# Patient Record
Sex: FEMALE | Race: WHITE | NOT HISPANIC OR LATINO | Employment: OTHER | URBAN - METROPOLITAN AREA
[De-identification: names, ages, dates, MRNs, and addresses within clinical notes are randomized per-mention and may not be internally consistent; named-entity substitution may affect disease eponyms.]

---

## 2024-09-09 ENCOUNTER — APPOINTMENT (OUTPATIENT)
Dept: HEMATOLOGY ONCOLOGY | Facility: MEDICAL CENTER | Age: 69
End: 2024-09-09
Payer: MEDICARE

## 2024-10-16 DIAGNOSIS — C53.8 MALIGNANT NEOPLASM OVERLAPPING CERVIX UTERI SITE (HCC): ICD-10-CM

## 2024-10-16 RX ORDER — 0.9 % SODIUM CHLORIDE 0.9 %
VIAL (ML) INJECTION PRN
Status: CANCELLED | OUTPATIENT
Start: 2024-10-22

## 2024-10-16 RX ORDER — 0.9 % SODIUM CHLORIDE 0.9 %
10 VIAL (ML) INJECTION PRN
OUTPATIENT
Start: 2024-11-01

## 2024-10-16 RX ORDER — 0.9 % SODIUM CHLORIDE 0.9 %
10 VIAL (ML) INJECTION PRN
Status: CANCELLED | OUTPATIENT
Start: 2024-10-22

## 2024-10-16 RX ORDER — 0.9 % SODIUM CHLORIDE 0.9 %
3 VIAL (ML) INJECTION PRN
Status: CANCELLED | OUTPATIENT
Start: 2024-10-21

## 2024-10-16 RX ORDER — METHYLPREDNISOLONE SODIUM SUCCINATE 125 MG/2ML
125 INJECTION, POWDER, LYOPHILIZED, FOR SOLUTION INTRAMUSCULAR; INTRAVENOUS PRN
Status: CANCELLED | OUTPATIENT
Start: 2024-10-22

## 2024-10-16 RX ORDER — ONDANSETRON 2 MG/ML
4 INJECTION INTRAMUSCULAR; INTRAVENOUS PRN
Status: CANCELLED | OUTPATIENT
Start: 2024-10-31

## 2024-10-16 RX ORDER — EPINEPHRINE 1 MG/ML(1)
0.5 AMPUL (ML) INJECTION PRN
OUTPATIENT
Start: 2024-11-01

## 2024-10-16 RX ORDER — PROCHLORPERAZINE MALEATE 10 MG
10 TABLET ORAL EVERY 6 HOURS PRN
Status: CANCELLED | OUTPATIENT
Start: 2024-10-22

## 2024-10-16 RX ORDER — ONDANSETRON 8 MG/1
8 TABLET, ORALLY DISINTEGRATING ORAL PRN
OUTPATIENT
Start: 2024-11-01

## 2024-10-16 RX ORDER — PROCHLORPERAZINE MALEATE 10 MG
10 TABLET ORAL EVERY 6 HOURS PRN
OUTPATIENT
Start: 2024-11-01

## 2024-10-16 RX ORDER — SODIUM CHLORIDE 9 MG/ML
INJECTION, SOLUTION INTRAVENOUS CONTINUOUS
Status: CANCELLED | OUTPATIENT
Start: 2024-10-31

## 2024-10-16 RX ORDER — ONDANSETRON 2 MG/ML
4 INJECTION INTRAMUSCULAR; INTRAVENOUS PRN
OUTPATIENT
Start: 2024-11-01

## 2024-10-16 RX ORDER — 0.9 % SODIUM CHLORIDE 0.9 %
VIAL (ML) INJECTION PRN
Status: CANCELLED | OUTPATIENT
Start: 2024-10-21

## 2024-10-16 RX ORDER — DIPHENHYDRAMINE HYDROCHLORIDE 50 MG/ML
50 INJECTION INTRAMUSCULAR; INTRAVENOUS PRN
Status: CANCELLED | OUTPATIENT
Start: 2024-10-31

## 2024-10-16 RX ORDER — METHYLPREDNISOLONE SODIUM SUCCINATE 125 MG/2ML
125 INJECTION, POWDER, LYOPHILIZED, FOR SOLUTION INTRAMUSCULAR; INTRAVENOUS PRN
OUTPATIENT
Start: 2024-11-01

## 2024-10-16 RX ORDER — 0.9 % SODIUM CHLORIDE 0.9 %
10 VIAL (ML) INJECTION PRN
Status: CANCELLED | OUTPATIENT
Start: 2024-10-31

## 2024-10-16 RX ORDER — EPINEPHRINE 1 MG/ML(1)
0.5 AMPUL (ML) INJECTION PRN
Status: CANCELLED | OUTPATIENT
Start: 2024-10-22

## 2024-10-16 RX ORDER — ONDANSETRON 2 MG/ML
8 INJECTION INTRAMUSCULAR; INTRAVENOUS ONCE
OUTPATIENT
Start: 2024-11-01 | End: 2024-10-24

## 2024-10-16 RX ORDER — DIPHENHYDRAMINE HYDROCHLORIDE 50 MG/ML
50 INJECTION INTRAMUSCULAR; INTRAVENOUS PRN
OUTPATIENT
Start: 2024-11-01

## 2024-10-16 RX ORDER — DIPHENHYDRAMINE HYDROCHLORIDE 50 MG/ML
50 INJECTION INTRAMUSCULAR; INTRAVENOUS PRN
Status: CANCELLED | OUTPATIENT
Start: 2024-10-22

## 2024-10-16 RX ORDER — SODIUM CHLORIDE 9 MG/ML
INJECTION, SOLUTION INTRAVENOUS CONTINUOUS
Status: CANCELLED | OUTPATIENT
Start: 2024-10-22

## 2024-10-16 RX ORDER — 0.9 % SODIUM CHLORIDE 0.9 %
VIAL (ML) INJECTION PRN
Status: CANCELLED | OUTPATIENT
Start: 2024-10-31

## 2024-10-16 RX ORDER — EPINEPHRINE 1 MG/ML(1)
0.5 AMPUL (ML) INJECTION PRN
Status: CANCELLED | OUTPATIENT
Start: 2024-10-31

## 2024-10-16 RX ORDER — 0.9 % SODIUM CHLORIDE 0.9 %
10 VIAL (ML) INJECTION PRN
Status: CANCELLED | OUTPATIENT
Start: 2024-10-21

## 2024-10-16 RX ORDER — 0.9 % SODIUM CHLORIDE 0.9 %
3 VIAL (ML) INJECTION PRN
OUTPATIENT
Start: 2024-11-01

## 2024-10-16 RX ORDER — ONDANSETRON 2 MG/ML
8 INJECTION INTRAMUSCULAR; INTRAVENOUS ONCE
Status: CANCELLED | OUTPATIENT
Start: 2024-10-31 | End: 2024-10-23

## 2024-10-16 RX ORDER — SODIUM CHLORIDE 9 MG/ML
1000 INJECTION, SOLUTION INTRAVENOUS ONCE
Status: CANCELLED | OUTPATIENT
Start: 2024-10-22

## 2024-10-16 RX ORDER — SODIUM CHLORIDE 9 MG/ML
INJECTION, SOLUTION INTRAVENOUS CONTINUOUS
OUTPATIENT
Start: 2024-11-01

## 2024-10-16 RX ORDER — 0.9 % SODIUM CHLORIDE 0.9 %
3 VIAL (ML) INJECTION PRN
Status: CANCELLED | OUTPATIENT
Start: 2024-10-31

## 2024-10-16 RX ORDER — ONDANSETRON 2 MG/ML
4 INJECTION INTRAMUSCULAR; INTRAVENOUS PRN
Status: CANCELLED | OUTPATIENT
Start: 2024-10-22

## 2024-10-16 RX ORDER — 0.9 % SODIUM CHLORIDE 0.9 %
VIAL (ML) INJECTION PRN
OUTPATIENT
Start: 2024-11-01

## 2024-10-16 RX ORDER — 0.9 % SODIUM CHLORIDE 0.9 %
3 VIAL (ML) INJECTION PRN
Status: CANCELLED | OUTPATIENT
Start: 2024-10-22

## 2024-10-16 RX ORDER — ONDANSETRON 8 MG/1
8 TABLET, ORALLY DISINTEGRATING ORAL PRN
Status: CANCELLED | OUTPATIENT
Start: 2024-10-31

## 2024-10-16 RX ORDER — PROCHLORPERAZINE MALEATE 10 MG
10 TABLET ORAL EVERY 6 HOURS PRN
Status: CANCELLED | OUTPATIENT
Start: 2024-10-31

## 2024-10-16 RX ORDER — ONDANSETRON 8 MG/1
8 TABLET, ORALLY DISINTEGRATING ORAL PRN
Status: CANCELLED | OUTPATIENT
Start: 2024-10-22

## 2024-10-16 RX ORDER — METHYLPREDNISOLONE SODIUM SUCCINATE 125 MG/2ML
125 INJECTION, POWDER, LYOPHILIZED, FOR SOLUTION INTRAMUSCULAR; INTRAVENOUS PRN
Status: CANCELLED | OUTPATIENT
Start: 2024-10-31

## 2024-10-22 ENCOUNTER — PATIENT OUTREACH (OUTPATIENT)
Dept: ONCOLOGY | Facility: MEDICAL CENTER | Age: 69
End: 2024-10-22
Payer: MEDICARE

## 2024-10-29 ENCOUNTER — TELEPHONE (OUTPATIENT)
Dept: ONCOLOGY | Facility: MEDICAL CENTER | Age: 69
End: 2024-10-29
Payer: MEDICARE

## 2024-10-30 ENCOUNTER — OUTPATIENT INFUSION SERVICES (OUTPATIENT)
Dept: ONCOLOGY | Facility: MEDICAL CENTER | Age: 69
End: 2024-10-30
Payer: MEDICARE

## 2024-10-30 VITALS
SYSTOLIC BLOOD PRESSURE: 131 MMHG | WEIGHT: 217.15 LBS | BODY MASS INDEX: 41 KG/M2 | RESPIRATION RATE: 18 BRPM | HEART RATE: 120 BPM | TEMPERATURE: 97.4 F | OXYGEN SATURATION: 95 % | DIASTOLIC BLOOD PRESSURE: 79 MMHG | HEIGHT: 61 IN

## 2024-10-30 DIAGNOSIS — C53.8 MALIGNANT NEOPLASM OVERLAPPING CERVIX UTERI SITE (HCC): ICD-10-CM

## 2024-10-30 LAB
ALBUMIN SERPL BCP-MCNC: 3.7 G/DL (ref 3.2–4.9)
ALBUMIN/GLOB SERPL: 1.1 G/DL
ALP SERPL-CCNC: 44 U/L (ref 30–99)
ALT SERPL-CCNC: 15 U/L (ref 2–50)
ANION GAP SERPL CALC-SCNC: 9 MMOL/L (ref 7–16)
AST SERPL-CCNC: 39 U/L (ref 12–45)
BASOPHILS # BLD AUTO: 0.7 % (ref 0–1.8)
BASOPHILS # BLD: 0.06 K/UL (ref 0–0.12)
BILIRUB SERPL-MCNC: 0.6 MG/DL (ref 0.1–1.5)
BUN SERPL-MCNC: 10 MG/DL (ref 8–22)
CALCIUM ALBUM COR SERPL-MCNC: 9.4 MG/DL (ref 8.5–10.5)
CALCIUM SERPL-MCNC: 9.2 MG/DL (ref 8.5–10.5)
CHLORIDE SERPL-SCNC: 104 MMOL/L (ref 96–112)
CO2 SERPL-SCNC: 23 MMOL/L (ref 20–33)
CREAT SERPL-MCNC: 0.75 MG/DL (ref 0.5–1.4)
EOSINOPHIL # BLD AUTO: 0.2 K/UL (ref 0–0.51)
EOSINOPHIL NFR BLD: 2.5 % (ref 0–6.9)
ERYTHROCYTE [DISTWIDTH] IN BLOOD BY AUTOMATED COUNT: 35.6 FL (ref 35.9–50)
GFR SERPLBLD CREATININE-BSD FMLA CKD-EPI: 86 ML/MIN/1.73 M 2
GLOBULIN SER CALC-MCNC: 3.3 G/DL (ref 1.9–3.5)
GLUCOSE SERPL-MCNC: 131 MG/DL (ref 65–99)
HCT VFR BLD AUTO: 37.5 % (ref 37–47)
HGB BLD-MCNC: 11.8 G/DL (ref 12–16)
IMM GRANULOCYTES # BLD AUTO: 0.03 K/UL (ref 0–0.11)
IMM GRANULOCYTES NFR BLD AUTO: 0.4 % (ref 0–0.9)
LYMPHOCYTES # BLD AUTO: 1.83 K/UL (ref 1–4.8)
LYMPHOCYTES NFR BLD: 22.4 % (ref 22–41)
MAGNESIUM SERPL-MCNC: 2.1 MG/DL (ref 1.5–2.5)
MCH RBC QN AUTO: 20.9 PG (ref 27–33)
MCHC RBC AUTO-ENTMCNC: 31.5 G/DL (ref 32.2–35.5)
MCV RBC AUTO: 66.5 FL (ref 81.4–97.8)
MONOCYTES # BLD AUTO: 0.89 K/UL (ref 0–0.85)
MONOCYTES NFR BLD AUTO: 10.9 % (ref 0–13.4)
NEUTROPHILS # BLD AUTO: 5.15 K/UL (ref 1.82–7.42)
NEUTROPHILS NFR BLD: 63.1 % (ref 44–72)
NRBC # BLD AUTO: 0 K/UL
NRBC BLD-RTO: 0 /100 WBC (ref 0–0.2)
OUTPT INFUS CBC COMMENT OICOM: ABNORMAL
PLATELET # BLD AUTO: 373 K/UL (ref 164–446)
PMV BLD AUTO: 10 FL (ref 9–12.9)
POTASSIUM SERPL-SCNC: 3.9 MMOL/L (ref 3.6–5.5)
PROT SERPL-MCNC: 7 G/DL (ref 6–8.2)
RBC # BLD AUTO: 5.64 M/UL (ref 4.2–5.4)
SODIUM SERPL-SCNC: 136 MMOL/L (ref 135–145)
WBC # BLD AUTO: 8.2 K/UL (ref 4.8–10.8)

## 2024-10-30 PROCEDURE — 96417 CHEMO IV INFUS EACH ADDL SEQ: CPT

## 2024-10-30 PROCEDURE — 96415 CHEMO IV INFUSION ADDL HR: CPT

## 2024-10-30 PROCEDURE — 96367 TX/PROPH/DG ADDL SEQ IV INF: CPT

## 2024-10-30 PROCEDURE — 700111 HCHG RX REV CODE 636 W/ 250 OVERRIDE (IP): Mod: JZ

## 2024-10-30 PROCEDURE — 80053 COMPREHEN METABOLIC PANEL: CPT

## 2024-10-30 PROCEDURE — 83735 ASSAY OF MAGNESIUM: CPT

## 2024-10-30 PROCEDURE — 304540 HCHG NITRO SET VENT 2ND TUB

## 2024-10-30 PROCEDURE — 96413 CHEMO IV INFUSION 1 HR: CPT

## 2024-10-30 PROCEDURE — 96375 TX/PRO/DX INJ NEW DRUG ADDON: CPT

## 2024-10-30 PROCEDURE — 700105 HCHG RX REV CODE 258

## 2024-10-30 PROCEDURE — 85025 COMPLETE CBC W/AUTO DIFF WBC: CPT

## 2024-10-30 PROCEDURE — 96361 HYDRATE IV INFUSION ADD-ON: CPT

## 2024-10-30 RX ORDER — DEXAMETHASONE SODIUM PHOSPHATE 4 MG/ML
12 INJECTION, SOLUTION INTRA-ARTICULAR; INTRALESIONAL; INTRAMUSCULAR; INTRAVENOUS; SOFT TISSUE ONCE
Status: COMPLETED | OUTPATIENT
Start: 2024-10-30 | End: 2024-10-30

## 2024-10-30 RX ORDER — DEXAMETHASONE 4 MG/1
4 TABLET ORAL 2 TIMES DAILY
COMMUNITY

## 2024-10-30 RX ORDER — ONDANSETRON 4 MG/1
4 TABLET, ORALLY DISINTEGRATING ORAL EVERY 6 HOURS PRN
COMMUNITY

## 2024-10-30 RX ORDER — ONDANSETRON 2 MG/ML
16 INJECTION INTRAMUSCULAR; INTRAVENOUS ONCE
Status: COMPLETED | OUTPATIENT
Start: 2024-10-30 | End: 2024-10-30

## 2024-10-30 RX ORDER — SODIUM CHLORIDE 9 MG/ML
1000 INJECTION, SOLUTION INTRAVENOUS ONCE
Status: COMPLETED | OUTPATIENT
Start: 2024-10-30 | End: 2024-10-30

## 2024-10-30 RX ADMIN — MAGNESIUM SULFATE HEPTAHYDRATE: 500 INJECTION, SOLUTION INTRAMUSCULAR; INTRAVENOUS at 11:39

## 2024-10-30 RX ADMIN — FOSAPREPITANT 150 MG: 150 INJECTION, POWDER, LYOPHILIZED, FOR SOLUTION INTRAVENOUS at 08:58

## 2024-10-30 RX ADMIN — CISPLATIN 155 MG: 1 INJECTION INTRAVENOUS at 09:32

## 2024-10-30 RX ADMIN — ONDANSETRON 16 MG: 2 INJECTION INTRAMUSCULAR; INTRAVENOUS at 08:50

## 2024-10-30 RX ADMIN — SODIUM CHLORIDE 208 MG: 9 INJECTION, SOLUTION INTRAVENOUS at 11:41

## 2024-10-30 RX ADMIN — SODIUM CHLORIDE 1000 ML: 9 INJECTION, SOLUTION INTRAVENOUS at 07:58

## 2024-10-30 RX ADMIN — DEXAMETHASONE SODIUM PHOSPHATE 12 MG: 4 INJECTION INTRA-ARTICULAR; INTRALESIONAL; INTRAMUSCULAR; INTRAVENOUS; SOFT TISSUE at 08:45

## 2024-10-31 ENCOUNTER — OUTPATIENT INFUSION SERVICES (OUTPATIENT)
Dept: ONCOLOGY | Facility: MEDICAL CENTER | Age: 69
End: 2024-10-31
Payer: MEDICARE

## 2024-10-31 ENCOUNTER — PHARMACY VISIT (OUTPATIENT)
Dept: PHARMACY | Facility: MEDICAL CENTER | Age: 69
End: 2024-10-31
Payer: COMMERCIAL

## 2024-10-31 VITALS
HEART RATE: 105 BPM | OXYGEN SATURATION: 94 % | WEIGHT: 220.68 LBS | DIASTOLIC BLOOD PRESSURE: 59 MMHG | BODY MASS INDEX: 41.66 KG/M2 | HEIGHT: 61 IN | SYSTOLIC BLOOD PRESSURE: 107 MMHG | RESPIRATION RATE: 18 BRPM | TEMPERATURE: 96.5 F

## 2024-10-31 DIAGNOSIS — C53.8 MALIGNANT NEOPLASM OVERLAPPING CERVIX UTERI SITE (HCC): ICD-10-CM

## 2024-10-31 PROCEDURE — 304540 HCHG NITRO SET VENT 2ND TUB

## 2024-10-31 PROCEDURE — RXMED WILLOW AMBULATORY MEDICATION CHARGE

## 2024-10-31 PROCEDURE — 96375 TX/PRO/DX INJ NEW DRUG ADDON: CPT

## 2024-10-31 PROCEDURE — 700105 HCHG RX REV CODE 258

## 2024-10-31 PROCEDURE — 700111 HCHG RX REV CODE 636 W/ 250 OVERRIDE (IP): Mod: JZ

## 2024-10-31 PROCEDURE — 96413 CHEMO IV INFUSION 1 HR: CPT

## 2024-10-31 RX ORDER — ONDANSETRON 2 MG/ML
8 INJECTION INTRAMUSCULAR; INTRAVENOUS ONCE
Status: COMPLETED | OUTPATIENT
Start: 2024-10-31 | End: 2024-10-31

## 2024-10-31 RX ADMIN — ONDANSETRON 8 MG: 2 INJECTION INTRAMUSCULAR; INTRAVENOUS at 15:25

## 2024-10-31 RX ADMIN — SODIUM CHLORIDE 208 MG: 9 INJECTION, SOLUTION INTRAVENOUS at 15:41

## 2024-10-31 ASSESSMENT — FIBROSIS 4 INDEX: FIB4 SCORE: 1.86

## 2024-11-01 ENCOUNTER — APPOINTMENT (OUTPATIENT)
Dept: ONCOLOGY | Facility: MEDICAL CENTER | Age: 69
End: 2024-11-01
Payer: MEDICARE

## 2024-11-01 VITALS
WEIGHT: 221.56 LBS | OXYGEN SATURATION: 91 % | BODY MASS INDEX: 43.5 KG/M2 | SYSTOLIC BLOOD PRESSURE: 134 MMHG | DIASTOLIC BLOOD PRESSURE: 66 MMHG | HEIGHT: 60 IN | TEMPERATURE: 96.9 F | RESPIRATION RATE: 18 BRPM | HEART RATE: 104 BPM

## 2024-11-01 DIAGNOSIS — C53.8 MALIGNANT NEOPLASM OVERLAPPING CERVIX UTERI SITE (HCC): ICD-10-CM

## 2024-11-01 PROCEDURE — 96377 APPLICATON ON-BODY INJECTOR: CPT

## 2024-11-01 PROCEDURE — 96413 CHEMO IV INFUSION 1 HR: CPT

## 2024-11-01 PROCEDURE — 700105 HCHG RX REV CODE 258

## 2024-11-01 PROCEDURE — 304540 HCHG NITRO SET VENT 2ND TUB

## 2024-11-01 PROCEDURE — 700111 HCHG RX REV CODE 636 W/ 250 OVERRIDE (IP): Mod: JZ

## 2024-11-01 PROCEDURE — 96375 TX/PRO/DX INJ NEW DRUG ADDON: CPT

## 2024-11-01 RX ORDER — ONDANSETRON 2 MG/ML
8 INJECTION INTRAMUSCULAR; INTRAVENOUS ONCE
Status: COMPLETED | OUTPATIENT
Start: 2024-11-01 | End: 2024-11-01

## 2024-11-01 RX ADMIN — SODIUM CHLORIDE 208 MG: 9 INJECTION, SOLUTION INTRAVENOUS at 16:23

## 2024-11-01 RX ADMIN — PEGFILGRASTIM 6 MG: KIT SUBCUTANEOUS at 16:51

## 2024-11-01 RX ADMIN — ONDANSETRON 8 MG: 2 INJECTION INTRAMUSCULAR; INTRAVENOUS at 16:13

## 2024-11-01 ASSESSMENT — FIBROSIS 4 INDEX: FIB4 SCORE: 1.86

## 2024-11-01 NOTE — PROGRESS NOTES
Chemotherapy Verification - PRIMARY RN      Height = 153cm  Weight = 101kg  BSA = 2.07m2       Medication: etoposide (Toposar)  Dose: 100mg/m2  Calculated Dose: 207mg                              (In mg/m2, AUC, mg/kg)       I confirm this process was performed independently with the BSA and all final chemotherapy dosing calculations congruent.  Any discrepancies of 10% or greater have been addressed with the chemotherapy pharmacist. The resolution of the discrepancy has been documented in the EPIC progress notes.

## 2024-11-01 NOTE — PROGRESS NOTES
Chemotherapy Verification - SECONDARY RN       Height = 153 cm  Weight = 101 kg  BSA = 2.07 m^2      Medication: etoposide (Toposar)  Dose: 100 mg/m^2  Calculated Dose: 207 mg                             (In mg/m2, AUC, mg/kg)     I confirm that this process was performed independently.

## 2024-11-01 NOTE — PROGRESS NOTES
"Pharmacy chemotherapy verification:    Dx: cervical cancer overlapping uteri         Protocol: Cisplatin/ etoposide     Cisplatin 75mg/m2 IV over 2 hours on day 1  Etoposide 100mg/m2 IV over 60 min daily on days 1-3  Q21-28 days x 4-6 cycles  NCCN Guidelines for Cervical cancer V.1.2024  Sean WK, et al - J Clin Oncol. 1985 Nov;3(11):1471-7.  Emmanuelle DR, et al - J Clin Oncol. 2011 Jun 1;29(16):2215-22.  Noah HB, et al - J Clin Oncol. 2005 Jun 1;23(16):3752-9.  Postm PE, et al - Eur J Cancer Clin Oncol. 1987 Sep;23(9):1409-11.  Amelia G et al - Eur J Cancer Clin Oncol. 1987 Nov;23(11):1697-9.  Renetta S et al - Gynecol Oncol Rep. 2022 Aug 4:43:534461.    Allergies:  Morphine and Morphine     /66   Pulse (!) 104   Temp 36.1 °C (96.9 °F) (Temporal)   Resp 18   Ht 1.53 m (5' 0.24\")   Wt 101 kg (221 lb 9 oz)   SpO2 91%   BMI 42.93 kg/m²  Body surface area is 2.07 meters squared.    All lab results 10/30/24 within treatment parameters.     Drug Order   (Drug name, dose, route, IV Fluid & volume, frequency, number of doses) Cycle: 1   day 3 of 3   Previous treatment: none     Medication = etoposide  Base Dose= 100mg/m2   Calc Dose: Base Dose x 2.07 m2 = 207 mg  Final Dose = 208 mg  Route = IV  Fluid & Volume =  mL  Admin Duration = Over 1-2 hours   Days 1-3       <10% difference, ok to treat with final dose   By my signature below, I confirm this process was performed independently with the BSA and all final chemotherapy dosing calculations congruent. I have reviewed the above chemotherapy order and that my calculation of the final dose and BSA (when applicable) corroborate those calculations of the  pharmacist. Discrepancies of 10% or greater in the written dose have been addressed and documented within the [facility] Progress notes.    Abran Junior, PharmD        "

## 2024-11-02 NOTE — PROGRESS NOTES
Diane arrived ambulatory for Day3/Cycle 1 of Toposar with Neulasta Onpro. Plan of care reviewed, patient reporting fatigue.  Patient arrived with PIV, flushed briskly, positive blood return. Premeds administered as ordered. Toposar administered with non-DEHP tubing over one hour. Patient tolerated treatment well, no signs of adverse reaction observed or reported. PIV, flushed, positive blood return. PIV discontinued, tip intact, dressed with gauze and coban. Next apt confirmed with patient. Patient discharged home in [facility].

## 2024-11-07 ENCOUNTER — APPOINTMENT (OUTPATIENT)
Dept: ADMISSIONS | Facility: MEDICAL CENTER | Age: 69
End: 2024-11-07
Attending: SPECIALIST
Payer: MEDICARE

## 2024-11-12 ENCOUNTER — PRE-ADMISSION TESTING (OUTPATIENT)
Dept: ADMISSIONS | Facility: MEDICAL CENTER | Age: 69
End: 2024-11-12
Attending: SPECIALIST
Payer: MEDICARE

## 2024-11-12 VITALS — HEIGHT: 61 IN | BODY MASS INDEX: 41.86 KG/M2

## 2024-11-12 RX ORDER — ACETAMINOPHEN 500 MG
500-1000 TABLET ORAL EVERY 6 HOURS PRN
COMMUNITY

## 2024-11-12 RX ORDER — IBUPROFEN 200 MG
400 TABLET ORAL EVERY 6 HOURS PRN
COMMUNITY

## 2024-11-12 NOTE — PREPROCEDURE INSTRUCTIONS
Pre-admit appointment completed.  Pt instructed to follow radiology instructions regarding meds and NPO status.

## 2024-11-13 RX ORDER — ONDANSETRON 2 MG/ML
4 INJECTION INTRAMUSCULAR; INTRAVENOUS PRN
Status: CANCELLED | OUTPATIENT
Start: 2024-11-21

## 2024-11-13 RX ORDER — EPINEPHRINE 1 MG/ML(1)
0.5 AMPUL (ML) INJECTION PRN
Status: CANCELLED | OUTPATIENT
Start: 2024-11-21

## 2024-11-13 RX ORDER — SODIUM CHLORIDE 9 MG/ML
INJECTION, SOLUTION INTRAVENOUS CONTINUOUS
Status: CANCELLED | OUTPATIENT
Start: 2024-11-22

## 2024-11-13 RX ORDER — 0.9 % SODIUM CHLORIDE 0.9 %
10 VIAL (ML) INJECTION PRN
Status: CANCELLED | OUTPATIENT
Start: 2024-11-23

## 2024-11-13 RX ORDER — METHYLPREDNISOLONE SODIUM SUCCINATE 125 MG/2ML
125 INJECTION, POWDER, LYOPHILIZED, FOR SOLUTION INTRAMUSCULAR; INTRAVENOUS PRN
Status: CANCELLED | OUTPATIENT
Start: 2024-11-23

## 2024-11-13 RX ORDER — ONDANSETRON 2 MG/ML
4 INJECTION INTRAMUSCULAR; INTRAVENOUS PRN
Status: CANCELLED | OUTPATIENT
Start: 2024-11-23

## 2024-11-13 RX ORDER — 0.9 % SODIUM CHLORIDE 0.9 %
3 VIAL (ML) INJECTION PRN
Status: CANCELLED | OUTPATIENT
Start: 2024-11-22

## 2024-11-13 RX ORDER — 0.9 % SODIUM CHLORIDE 0.9 %
VIAL (ML) INJECTION PRN
Status: CANCELLED | OUTPATIENT
Start: 2024-11-23

## 2024-11-13 RX ORDER — 0.9 % SODIUM CHLORIDE 0.9 %
3 VIAL (ML) INJECTION PRN
Status: CANCELLED | OUTPATIENT
Start: 2024-11-23

## 2024-11-13 RX ORDER — EPINEPHRINE 1 MG/ML(1)
0.5 AMPUL (ML) INJECTION PRN
Status: CANCELLED | OUTPATIENT
Start: 2024-11-22

## 2024-11-13 RX ORDER — SODIUM CHLORIDE 9 MG/ML
INJECTION, SOLUTION INTRAVENOUS CONTINUOUS
Status: CANCELLED | OUTPATIENT
Start: 2024-11-23

## 2024-11-13 RX ORDER — 0.9 % SODIUM CHLORIDE 0.9 %
10 VIAL (ML) INJECTION PRN
Status: CANCELLED | OUTPATIENT
Start: 2024-11-19

## 2024-11-13 RX ORDER — PROCHLORPERAZINE MALEATE 10 MG
10 TABLET ORAL EVERY 6 HOURS PRN
Status: CANCELLED | OUTPATIENT
Start: 2024-11-23

## 2024-11-13 RX ORDER — METHYLPREDNISOLONE SODIUM SUCCINATE 125 MG/2ML
125 INJECTION, POWDER, LYOPHILIZED, FOR SOLUTION INTRAMUSCULAR; INTRAVENOUS PRN
Status: CANCELLED | OUTPATIENT
Start: 2024-11-22

## 2024-11-13 RX ORDER — PROCHLORPERAZINE MALEATE 10 MG
10 TABLET ORAL EVERY 6 HOURS PRN
Status: CANCELLED | OUTPATIENT
Start: 2024-11-22

## 2024-11-13 RX ORDER — 0.9 % SODIUM CHLORIDE 0.9 %
VIAL (ML) INJECTION PRN
Status: CANCELLED | OUTPATIENT
Start: 2024-11-19

## 2024-11-13 RX ORDER — EPINEPHRINE 1 MG/ML(1)
0.5 AMPUL (ML) INJECTION PRN
Status: CANCELLED | OUTPATIENT
Start: 2024-11-23

## 2024-11-13 RX ORDER — 0.9 % SODIUM CHLORIDE 0.9 %
10 VIAL (ML) INJECTION PRN
Status: CANCELLED | OUTPATIENT
Start: 2024-11-22

## 2024-11-13 RX ORDER — SODIUM CHLORIDE 9 MG/ML
INJECTION, SOLUTION INTRAVENOUS CONTINUOUS
Status: CANCELLED | OUTPATIENT
Start: 2024-11-21

## 2024-11-13 RX ORDER — DIPHENHYDRAMINE HYDROCHLORIDE 50 MG/ML
50 INJECTION INTRAMUSCULAR; INTRAVENOUS PRN
Status: CANCELLED | OUTPATIENT
Start: 2024-11-21

## 2024-11-13 RX ORDER — 0.9 % SODIUM CHLORIDE 0.9 %
3 VIAL (ML) INJECTION PRN
Status: CANCELLED | OUTPATIENT
Start: 2024-11-21

## 2024-11-13 RX ORDER — DIPHENHYDRAMINE HYDROCHLORIDE 50 MG/ML
50 INJECTION INTRAMUSCULAR; INTRAVENOUS PRN
Status: CANCELLED | OUTPATIENT
Start: 2024-11-22

## 2024-11-13 RX ORDER — ONDANSETRON 2 MG/ML
4 INJECTION INTRAMUSCULAR; INTRAVENOUS PRN
Status: CANCELLED | OUTPATIENT
Start: 2024-11-22

## 2024-11-13 RX ORDER — 0.9 % SODIUM CHLORIDE 0.9 %
3 VIAL (ML) INJECTION PRN
Status: CANCELLED | OUTPATIENT
Start: 2024-11-19

## 2024-11-13 RX ORDER — ONDANSETRON 8 MG/1
8 TABLET, ORALLY DISINTEGRATING ORAL PRN
Status: CANCELLED | OUTPATIENT
Start: 2024-11-21

## 2024-11-13 RX ORDER — ONDANSETRON 8 MG/1
8 TABLET, ORALLY DISINTEGRATING ORAL PRN
Status: CANCELLED | OUTPATIENT
Start: 2024-11-22

## 2024-11-13 RX ORDER — 0.9 % SODIUM CHLORIDE 0.9 %
VIAL (ML) INJECTION PRN
Status: CANCELLED | OUTPATIENT
Start: 2024-11-22

## 2024-11-13 RX ORDER — DIPHENHYDRAMINE HYDROCHLORIDE 50 MG/ML
50 INJECTION INTRAMUSCULAR; INTRAVENOUS PRN
Status: CANCELLED | OUTPATIENT
Start: 2024-11-23

## 2024-11-13 RX ORDER — ONDANSETRON 8 MG/1
8 TABLET, ORALLY DISINTEGRATING ORAL PRN
Status: CANCELLED | OUTPATIENT
Start: 2024-11-23

## 2024-11-13 RX ORDER — METHYLPREDNISOLONE SODIUM SUCCINATE 125 MG/2ML
125 INJECTION, POWDER, LYOPHILIZED, FOR SOLUTION INTRAMUSCULAR; INTRAVENOUS PRN
Status: CANCELLED | OUTPATIENT
Start: 2024-11-21

## 2024-11-13 RX ORDER — ONDANSETRON 2 MG/ML
8 INJECTION INTRAMUSCULAR; INTRAVENOUS ONCE
Status: CANCELLED | OUTPATIENT
Start: 2024-11-23 | End: 2024-11-22

## 2024-11-13 RX ORDER — 0.9 % SODIUM CHLORIDE 0.9 %
10 VIAL (ML) INJECTION PRN
Status: CANCELLED | OUTPATIENT
Start: 2024-11-21

## 2024-11-13 RX ORDER — ONDANSETRON 2 MG/ML
8 INJECTION INTRAMUSCULAR; INTRAVENOUS ONCE
Status: CANCELLED | OUTPATIENT
Start: 2024-11-22 | End: 2024-11-21

## 2024-11-13 RX ORDER — SODIUM CHLORIDE 9 MG/ML
1000 INJECTION, SOLUTION INTRAVENOUS ONCE
Status: CANCELLED | OUTPATIENT
Start: 2024-11-21

## 2024-11-13 RX ORDER — PROCHLORPERAZINE MALEATE 10 MG
10 TABLET ORAL EVERY 6 HOURS PRN
Status: CANCELLED | OUTPATIENT
Start: 2024-11-21

## 2024-11-13 RX ORDER — 0.9 % SODIUM CHLORIDE 0.9 %
VIAL (ML) INJECTION PRN
Status: CANCELLED | OUTPATIENT
Start: 2024-11-21

## 2024-11-15 ENCOUNTER — PATIENT OUTREACH (OUTPATIENT)
Dept: ONCOLOGY | Facility: MEDICAL CENTER | Age: 69
End: 2024-11-15
Payer: COMMERCIAL

## 2024-11-15 NOTE — PROGRESS NOTES
On November 15, 2024, Oncology Social Worker Jane Song processed pt.'s [facility] Patient Transportation Thanh application.  Pt. will receive $200 in gas card to assist with transportation.  [facility] Duncna left gas card at [facility] for pt. to .

## 2024-11-18 NOTE — PROGRESS NOTES
"Pharmacy chemotherapy verification:    Dx: cervical cancer overlapping uteri         Protocol: Cisplatin/ etoposide     Cisplatin 75mg/m2 IV over 2 hours on day 1  Etoposide 100mg/m2 IV over 60 min daily on days 1-3  Q21-28 days x 4-6 cycles  NCCN Guidelines for Cervical cancer V.1.2024  Sean WK, et al - J Clin Oncol. 1985 Nov;3(11):1471-7.  Emmanuelle DR, et al - J Clin Oncol. 2011 Jun 1;29(16):2215-22.  Noah HB, et al - J Clin Oncol. 2005 Jun 1;23(16):8212-9.  Moshe PE et al - Eur J Cancer Clin Oncol. 1987 Sep;23(9):1409-11.  Amelia G et al - Eur J Cancer Clin Oncol. 1987 Nov;23(11):1697-9.  Renetta S et al - Gynecol Oncol Rep. 2022 Aug 4:43:122447.    Allergies:  Morphine and Morphine     /71   Pulse 95   Temp 36.2 °C (97.2 °F) (Temporal)   Resp 18   Ht 1.53 m (5' 0.24\")   Wt 94 kg (207 lb 3.7 oz)   SpO2 98%   BMI 40.16 kg/m²  Body surface area is 2 meters squared.    Labs 11/20/24:  ANC~ 3390 Plt = 395k   Hgb = 9.3     SCr = 0.71 mg/dL CrCl ~ 110 mL/min   K = 3.9  Mag = 1.9  AST/ALT/ALP = 22/12/55 Tbili = 0.4    Drug Order   (Drug name, dose, route, IV Fluid & volume, frequency, number of doses) Cycle 2 Day 1 of 3   Previous treatment: none     Medication = cisplatin  Base Dose= 75mg/m2  Calc Dose: Base Dose x 2 m2 = 150 mg  Final Dose = 150 mg  Route = IV  Fluid & Volume =  mL  Admin Duration = Over 120 mins   Day 1 only       <10% difference, ok to treat with final dose   Medication = etoposide  Base Dose= 100mg/m2   Calc Dose: Base Dose x 2 m2 = 200 mg  Final Dose = 200 mg  Route = IV  Fluid & Volume =  mL  Admin Duration = Over 2 hours   Days 1-3       <10% difference, ok to treat with final dose   By my signature below, I confirm this process was performed independently with the BSA and all final chemotherapy dosing calculations congruent. I have reviewed the above chemotherapy order and that my calculation of the final dose and BSA (when applicable) corroborate those " calculations of the  pharmacist. Discrepancies of 10% or greater in the written dose have been addressed and documented within the EPIC Progress notes.    Twan Terry, PharmD

## 2024-11-18 NOTE — PROGRESS NOTES
"Pharmacy Chemotherapy Calculations    Dx: Cervical cancer  Cycle 2, Day 1-3  Previous treatment = C1  D1-3 Oct 30-Nov 1, 2024    Protocol: Cisplatin/Etoposide  Cisplatin 75 mg/m2 IV over 2 hours on Day 1  Etoposide 100 mg/m2 IV over 60 minutes daily on Days 1-3   21- to 28-day cycle for 4-6 cycles  NCCN Guidelines for Cervical Cancer V.1.2024.  Sean WK, et al. J Clin Oncol. 1985;3(11):1471-7.  Emmanuelle SUAREZ, et al. J Clin Oncol. 2011;29(16):2215-22.  Noah HB, et al. J Clin Oncol. 2005;23(16):3752- 9.  Postm PE, et al. Eur J Cancer Clin Oncol. 1987;23(9):1409-11.  Amelia G, et al. Eur J Cancer Clin Oncol. 1987;23(11):1697-9.  Maryjanehandas S , et al. Gynecol Oncol Rep. 2022;43:830525.    Allergies:  Morphine and Morphine       /71   Pulse 95   Temp 36.2 °C (97.2 °F) (Temporal)   Resp 18   Ht 1.53 m (5' 0.24\")   Wt 94 kg (207 lb 3.7 oz)   SpO2 98%   BMI 40.16 kg/m²  Body surface area is 2 meters squared.    All lab results 11/20/24 within treatment parameters.     Cisplatin 75 mg/m² x 2 m² = 150 mg   <10% difference, OK to treat with final dose = 150 mg on Day 1 only    Etoposide 100 mg/m² x 2 m² = 200 mg   <10% difference, OK to treat with final dose = 200 mg on Days 1-3    Raymond Hilliard, PharmD  "

## 2024-11-19 ENCOUNTER — APPOINTMENT (OUTPATIENT)
Dept: RADIOLOGY | Facility: MEDICAL CENTER | Age: 69
End: 2024-11-19
Attending: SPECIALIST
Payer: MEDICARE

## 2024-11-19 ENCOUNTER — HOSPITAL ENCOUNTER (OUTPATIENT)
Facility: MEDICAL CENTER | Age: 69
End: 2024-11-19
Attending: SPECIALIST | Admitting: SPECIALIST
Payer: MEDICARE

## 2024-11-19 VITALS
RESPIRATION RATE: 20 BRPM | TEMPERATURE: 98.2 F | BODY MASS INDEX: 39.68 KG/M2 | HEART RATE: 83 BPM | OXYGEN SATURATION: 98 % | SYSTOLIC BLOOD PRESSURE: 135 MMHG | WEIGHT: 210 LBS | DIASTOLIC BLOOD PRESSURE: 91 MMHG

## 2024-11-19 DIAGNOSIS — C53.8 MALIGNANT NEOPLASM OF CERVICAL STUMP (HCC): ICD-10-CM

## 2024-11-19 DIAGNOSIS — Z51.11 ENCOUNTER FOR ANTINEOPLASTIC CHEMOTHERAPY: ICD-10-CM

## 2024-11-19 PROCEDURE — 160025 RECOVERY II MINUTES (STATS)

## 2024-11-19 PROCEDURE — 700101 HCHG RX REV CODE 250

## 2024-11-19 PROCEDURE — C1894 INTRO/SHEATH, NON-LASER: HCPCS

## 2024-11-19 PROCEDURE — 700111 HCHG RX REV CODE 636 W/ 250 OVERRIDE (IP)

## 2024-11-19 PROCEDURE — 160002 HCHG RECOVERY MINUTES (STAT)

## 2024-11-19 RX ORDER — MIDAZOLAM HYDROCHLORIDE 1 MG/ML
.5-2 INJECTION INTRAMUSCULAR; INTRAVENOUS PRN
Status: DISCONTINUED | OUTPATIENT
Start: 2024-11-19 | End: 2024-11-19 | Stop reason: HOSPADM

## 2024-11-19 RX ORDER — SODIUM CHLORIDE 9 MG/ML
500 INJECTION, SOLUTION INTRAVENOUS
Status: DISCONTINUED | OUTPATIENT
Start: 2024-11-19 | End: 2024-11-19 | Stop reason: HOSPADM

## 2024-11-19 RX ORDER — ONDANSETRON 2 MG/ML
4 INJECTION INTRAMUSCULAR; INTRAVENOUS EVERY 8 HOURS PRN
Status: DISCONTINUED | OUTPATIENT
Start: 2024-11-19 | End: 2024-11-19 | Stop reason: HOSPADM

## 2024-11-19 RX ORDER — ONDANSETRON 2 MG/ML
4 INJECTION INTRAMUSCULAR; INTRAVENOUS PRN
Status: DISCONTINUED | OUTPATIENT
Start: 2024-11-19 | End: 2024-11-19 | Stop reason: HOSPADM

## 2024-11-19 RX ORDER — MIDAZOLAM HYDROCHLORIDE 1 MG/ML
INJECTION INTRAMUSCULAR; INTRAVENOUS
Status: COMPLETED
Start: 2024-11-19 | End: 2024-11-19

## 2024-11-19 RX ORDER — LIDOCAINE HYDROCHLORIDE AND EPINEPHRINE 10; 10 MG/ML; UG/ML
INJECTION, SOLUTION INFILTRATION; PERINEURAL
Status: COMPLETED
Start: 2024-11-19 | End: 2024-11-19

## 2024-11-19 RX ORDER — HEPARIN SODIUM 200 [USP'U]/100ML
INJECTION, SOLUTION INTRAVENOUS
Status: DISCONTINUED
Start: 2024-11-19 | End: 2024-11-19 | Stop reason: HOSPADM

## 2024-11-19 RX ORDER — OXYCODONE HYDROCHLORIDE 5 MG/1
10 TABLET ORAL
Status: DISCONTINUED | OUTPATIENT
Start: 2024-11-19 | End: 2024-11-19 | Stop reason: HOSPADM

## 2024-11-19 RX ORDER — OXYCODONE HYDROCHLORIDE 5 MG/1
5 TABLET ORAL
Status: DISCONTINUED | OUTPATIENT
Start: 2024-11-19 | End: 2024-11-19 | Stop reason: HOSPADM

## 2024-11-19 RX ADMIN — FENTANYL CITRATE 50 MCG: 50 INJECTION, SOLUTION INTRAMUSCULAR; INTRAVENOUS at 10:03

## 2024-11-19 RX ADMIN — HEPARIN 500 UNITS: 100 SYRINGE at 10:21

## 2024-11-19 RX ADMIN — MIDAZOLAM HYDROCHLORIDE 1.5 MG: 1 INJECTION, SOLUTION INTRAMUSCULAR; INTRAVENOUS at 10:03

## 2024-11-19 RX ADMIN — MIDAZOLAM HYDROCHLORIDE 1.5 MG: 1 INJECTION INTRAMUSCULAR; INTRAVENOUS at 10:03

## 2024-11-19 RX ADMIN — LIDOCAINE HYDROCHLORIDE,EPINEPHRINE BITARTRATE: 10; .01 INJECTION, SOLUTION INFILTRATION; PERINEURAL at 10:00

## 2024-11-19 ASSESSMENT — FIBROSIS 4 INDEX: FIB4 SCORE: 1.86

## 2024-11-19 NOTE — OR SURGEON
Immediate Post- Operative Note        Findings: Cervical cancer.       Procedure(s): Port placement.       Estimated Blood Loss: Less than 5 ml        Complications: None            11/19/2024     10:15 AM     Jaden Coreas M.D.

## 2024-11-19 NOTE — DISCHARGE INSTRUCTIONS
Care After Implanted Port Insertion     The following information offers guidance on how to care for yourself after your procedure. If you have problems or questions, contact your health care provider.     What can I expect after the procedure?     After the procedure, it is common to have:   Discomfort at the port insertion site.   Bruising on the skin over the port. This should improve over 3-4 days.     Follow these instructions at home:     Port care   After your port is placed, you will get a 's information card. The card has information about your port. Keep this card with you at all times.   Take care of the port as told by your health care provider. Ask your health care provider if you or a family member can get training for taking care of the port at home.   Make sure to remember what type of port you have.     Incision care   Wash your hands with soap and water for at least 20 seconds before and after you change your bandage (dressing). If soap and water are not available, use hand .   Leave the initial dressing on for 48-72 hours.   Do not shower while the bandage is in place, only take a sponge bath. If the dressing gets wet or soiled remove it and replace as needed. Once the bandage is removed you may shower, but do not scrub the incision site until it is fully healed. Let warm soapy water run over the site then pat dry.    Leave stitches (sutures), skin glue, or adhesive strips in place. These skin closures may need to stay in place for 2 weeks or longer. If adhesive strip edges start to loosen and curl up, you may trim the loose edges. Do not remove adhesive strips completely unless your health care provider tells you to do that.     Check your port insertion site every day for signs of infection. Check for:   Redness, swelling, or pain.   Fluid or blood.   Warmth.   Pus or a bad smell.     Activity   Return to your normal activities as told by your health care provider. Ask your  health care provider what activities are safe for you.   Avoid lifting anything over 10lbs, any vigorous use of your arms, and any exercises that require your arms above your shoulders or behind your back for 1 week.      General instructions   Take over-the-counter and prescription medicines only as told by your health care provider.   Do not take baths, swim, or use a hot tub until the site is fully healed . You may take a shower once the dressing is removed in 48-72 hours.   Wear a medical alert bracelet in case of an emergency. This will tell any health care providers that you have a port.   Keep all follow-up visits. This is important.     Contact a health care provider if:   You cannot flush your port with saline as directed, or you cannot draw blood from the port.   You have a fever or chills.   You have redness, swelling, or pain around your port insertion site.   You have fluid or blood coming from your port insertion site.   Your port insertion site feels warm to the touch.   You have pus or a bad smell coming from the port insertion site.     Get help right away if:   You have chest pain or shortness of breath.   You have bleeding from your port that you cannot control.   These symptoms may be an emergency. Get help right away. Call 911.      What to Expect Post Sedation    Rest and take it easy for the first 24 hours.  A responsible adult is recommended to remain with you during that time.  It is normal to feel sleepy.  We encourage you to not do anything that requires balance, judgment or coordination.    FOR 24 HOURS DO NOT:  Drive, operate machinery or run household appliances.  Drink beer or alcoholic beverages.  Make important decisions or sign legal documents.    To avoid nausea, slowly advance diet as tolerated, avoiding spicy or greasy foods for the first day.  Add more substantial food to your diet according to your provider's instructions.  Babies can be fed formula or breast milk as soon as  they are hungry.  INCREASE FLUIDS AND FIBER TO AVOID CONSTIPATION.

## 2024-11-19 NOTE — PROGRESS NOTES
1036 - Arrived to PPU bay 1. AOx4, VSS on room air. Denied pain or nausea. Right upper chest port left accessed, chemo appointment tomorrow per patient. Site dressed with steri-strips, biopatch and clear bandage. Dressing CDI, area soft.     1040 - Patient's spouse Jalil brought back into room.     1045 - Patient provided with water and saltine crackers. Tolerating well.     1100 - patient resting comfortably in bed.     1130 - Discharge instruction given to patient and patient's spouse, Jalil. Verbalized understanding.     1140 - PIV removed. Patient independently getting dressed.     1147 - Discharged. Ambulated to care without assistance. Spouse, Jalil to drive.

## 2024-11-19 NOTE — PROGRESS NOTES
Pt presents to [facility] and consented by MD at bedside, confirmed by this RN and consent at bedside. No ancef per Dr. Coreas.

## 2024-11-19 NOTE — PROGRESS NOTES
Pt presents to [facility]. Pt was consented by MD at bedside, confirmed by this RN and consent at bedside. Pt transferred to [facility] table in Supine position. Patient underwent an image guided Right chest CVC port placement by Dr. Coreas. Procedure site was marked by MD and verified using imaging guidance. Pt placed on monitor, prepped and draped in a sterile fashion. Vitals were taken every 5 minutes and remained stable during procedure (see doc flow sheet for results). CO2 waveform capnography was monitored and remained WNL throughout procedure. Report called to Art STROUD. Pt transported by stretcher with RN to Bed1.     [employer] Clearvue  [id number] right chest  REF: [id number]  LOT: [id number]  EXP: 03/31/2026

## 2024-11-20 ENCOUNTER — OUTPATIENT INFUSION SERVICES (OUTPATIENT)
Dept: ONCOLOGY | Facility: MEDICAL CENTER | Age: 69
End: 2024-11-20
Payer: MEDICARE

## 2024-11-20 VITALS
BODY MASS INDEX: 40.69 KG/M2 | HEIGHT: 60 IN | DIASTOLIC BLOOD PRESSURE: 71 MMHG | RESPIRATION RATE: 18 BRPM | WEIGHT: 207.23 LBS | OXYGEN SATURATION: 98 % | TEMPERATURE: 97.2 F | SYSTOLIC BLOOD PRESSURE: 121 MMHG | HEART RATE: 95 BPM

## 2024-11-20 DIAGNOSIS — C53.8 MALIGNANT NEOPLASM OVERLAPPING CERVIX UTERI SITE (HCC): ICD-10-CM

## 2024-11-20 LAB
ALBUMIN SERPL BCP-MCNC: 3.8 G/DL (ref 3.2–4.9)
ALBUMIN/GLOB SERPL: 1.3 G/DL
ALP SERPL-CCNC: 55 U/L (ref 30–99)
ALT SERPL-CCNC: 12 U/L (ref 2–50)
ANION GAP SERPL CALC-SCNC: 8 MMOL/L (ref 7–16)
AST SERPL-CCNC: 22 U/L (ref 12–45)
BASOPHILS # BLD AUTO: 1 % (ref 0–1.8)
BASOPHILS # BLD: 0.07 K/UL (ref 0–0.12)
BILIRUB SERPL-MCNC: 0.4 MG/DL (ref 0.1–1.5)
BUN SERPL-MCNC: 10 MG/DL (ref 8–22)
CALCIUM ALBUM COR SERPL-MCNC: 9.3 MG/DL (ref 8.5–10.5)
CALCIUM SERPL-MCNC: 9.1 MG/DL (ref 8.5–10.5)
CHLORIDE SERPL-SCNC: 107 MMOL/L (ref 96–112)
CO2 SERPL-SCNC: 25 MMOL/L (ref 20–33)
CREAT SERPL-MCNC: 0.71 MG/DL (ref 0.5–1.4)
EOSINOPHIL # BLD AUTO: 0.03 K/UL (ref 0–0.51)
EOSINOPHIL NFR BLD: 0.4 % (ref 0–6.9)
ERYTHROCYTE [DISTWIDTH] IN BLOOD BY AUTOMATED COUNT: 36.4 FL (ref 35.9–50)
GFR SERPLBLD CREATININE-BSD FMLA CKD-EPI: 92 ML/MIN/1.73 M 2
GLOBULIN SER CALC-MCNC: 2.9 G/DL (ref 1.9–3.5)
GLUCOSE SERPL-MCNC: 117 MG/DL (ref 65–99)
HCT VFR BLD AUTO: 29.6 % (ref 37–47)
HGB BLD-MCNC: 9.3 G/DL (ref 12–16)
IMM GRANULOCYTES # BLD AUTO: 0.11 K/UL (ref 0–0.11)
IMM GRANULOCYTES NFR BLD AUTO: 1.6 % (ref 0–0.9)
LYMPHOCYTES # BLD AUTO: 2.43 K/UL (ref 1–4.8)
LYMPHOCYTES NFR BLD: 35.6 % (ref 22–41)
MAGNESIUM SERPL-MCNC: 1.9 MG/DL (ref 1.5–2.5)
MCH RBC QN AUTO: 21.2 PG (ref 27–33)
MCHC RBC AUTO-ENTMCNC: 31.4 G/DL (ref 32.2–35.5)
MCV RBC AUTO: 67.4 FL (ref 81.4–97.8)
MONOCYTES # BLD AUTO: 0.8 K/UL (ref 0–0.85)
MONOCYTES NFR BLD AUTO: 11.7 % (ref 0–13.4)
NEUTROPHILS # BLD AUTO: 3.39 K/UL (ref 1.82–7.42)
NEUTROPHILS NFR BLD: 49.7 % (ref 44–72)
NRBC # BLD AUTO: 0.03 K/UL
NRBC BLD-RTO: 0.4 /100 WBC (ref 0–0.2)
OUTPT INFUS CBC COMMENT OICOM: ABNORMAL
PLATELET # BLD AUTO: 395 K/UL (ref 164–446)
PMV BLD AUTO: 10 FL (ref 9–12.9)
POTASSIUM SERPL-SCNC: 3.9 MMOL/L (ref 3.6–5.5)
PROT SERPL-MCNC: 6.7 G/DL (ref 6–8.2)
RBC # BLD AUTO: 4.39 M/UL (ref 4.2–5.4)
SODIUM SERPL-SCNC: 140 MMOL/L (ref 135–145)
WBC # BLD AUTO: 6.8 K/UL (ref 4.8–10.8)

## 2024-11-20 PROCEDURE — 96417 CHEMO IV INFUS EACH ADDL SEQ: CPT

## 2024-11-20 PROCEDURE — 85025 COMPLETE CBC W/AUTO DIFF WBC: CPT

## 2024-11-20 PROCEDURE — 80053 COMPREHEN METABOLIC PANEL: CPT

## 2024-11-20 PROCEDURE — 83735 ASSAY OF MAGNESIUM: CPT

## 2024-11-20 PROCEDURE — 700105 HCHG RX REV CODE 258

## 2024-11-20 PROCEDURE — 96367 TX/PROPH/DG ADDL SEQ IV INF: CPT

## 2024-11-20 PROCEDURE — 96415 CHEMO IV INFUSION ADDL HR: CPT

## 2024-11-20 PROCEDURE — 96413 CHEMO IV INFUSION 1 HR: CPT

## 2024-11-20 PROCEDURE — 700111 HCHG RX REV CODE 636 W/ 250 OVERRIDE (IP)

## 2024-11-20 RX ORDER — SODIUM CHLORIDE 9 MG/ML
1000 INJECTION, SOLUTION INTRAVENOUS ONCE
Status: COMPLETED | OUTPATIENT
Start: 2024-11-20 | End: 2024-11-20

## 2024-11-20 RX ORDER — DEXAMETHASONE SODIUM PHOSPHATE 4 MG/ML
12 INJECTION, SOLUTION INTRA-ARTICULAR; INTRALESIONAL; INTRAMUSCULAR; INTRAVENOUS; SOFT TISSUE ONCE
Status: COMPLETED | OUTPATIENT
Start: 2024-11-20 | End: 2024-11-20

## 2024-11-20 RX ORDER — ONDANSETRON 2 MG/ML
16 INJECTION INTRAMUSCULAR; INTRAVENOUS ONCE
Status: COMPLETED | OUTPATIENT
Start: 2024-11-20 | End: 2024-11-20

## 2024-11-20 RX ADMIN — FOSAPREPITANT 150 MG: 150 INJECTION, POWDER, LYOPHILIZED, FOR SOLUTION INTRAVENOUS at 11:43

## 2024-11-20 RX ADMIN — ONDANSETRON 16 MG: 2 INJECTION INTRAMUSCULAR; INTRAVENOUS at 11:43

## 2024-11-20 RX ADMIN — MAGNESIUM SULFATE HEPTAHYDRATE: 500 INJECTION, SOLUTION INTRAMUSCULAR; INTRAVENOUS at 14:56

## 2024-11-20 RX ADMIN — CISPLATIN 150 MG: 1 INJECTION INTRAVENOUS at 12:41

## 2024-11-20 RX ADMIN — SODIUM CHLORIDE 200 MG: 9 INJECTION, SOLUTION INTRAVENOUS at 14:52

## 2024-11-20 RX ADMIN — DEXAMETHASONE SODIUM PHOSPHATE 12 MG: 4 INJECTION INTRA-ARTICULAR; INTRALESIONAL; INTRAMUSCULAR; INTRAVENOUS; SOFT TISSUE at 11:43

## 2024-11-20 RX ADMIN — SODIUM CHLORIDE 1000 ML: 9 INJECTION, SOLUTION INTRAVENOUS at 10:33

## 2024-11-20 RX ADMIN — HEPARIN 500 UNITS: 100 SYRINGE at 17:10

## 2024-11-20 ASSESSMENT — FIBROSIS 4 INDEX: FIB4 SCORE: 1.86

## 2024-11-20 ASSESSMENT — PAIN DESCRIPTION - PAIN TYPE: TYPE: ACUTE PAIN

## 2024-11-20 NOTE — PROGRESS NOTES
Chemotherapy Verification - SECONDARY RN       Height = 153 cm  Weight = 94 kg  BSA = 2.00 m^2       Medication: cisplatin (Platinol)  Dose: 75 mg/m^2  Calculated Dose: 150 mg                             (In mg/m2, AUC, mg/kg)     Medication: etoposide (Toposar)  Dose: 100 mg/m^2  Calculated Dose: 200 mg                             (In mg/m2, AUC, mg/kg)    I confirm that this process was performed independently.

## 2024-11-20 NOTE — PROGRESS NOTES
Chemotherapy Verification - PRIMARY RN      Height = 153 cm     Weight = 94 kg    BSA =  2 m^2      Medication: Cisplatin   Dose:  75 mg/m^2 Calculated Dose:  150 mg                            (In mg/m2, AUC, mg/kg)     Medication:  Etoposide   Dose: 100 mg/m^2  Calculated Dose:  200 mg                            (In mg/m2, AUC, mg/kg)          I confirm this process was performed independently with the BSA and all final chemotherapy dosing calculations congruent.  Any discrepancies of 10% or greater have been addressed with the chemotherapy pharmacist. The resolution of the discrepancy has been documented in the EPIC progress notes.

## 2024-11-21 ENCOUNTER — OUTPATIENT INFUSION SERVICES (OUTPATIENT)
Dept: ONCOLOGY | Facility: MEDICAL CENTER | Age: 69
End: 2024-11-21
Payer: MEDICARE

## 2024-11-21 VITALS
DIASTOLIC BLOOD PRESSURE: 60 MMHG | HEIGHT: 60 IN | TEMPERATURE: 97 F | WEIGHT: 213.85 LBS | OXYGEN SATURATION: 98 % | SYSTOLIC BLOOD PRESSURE: 132 MMHG | RESPIRATION RATE: 18 BRPM | BODY MASS INDEX: 41.98 KG/M2 | HEART RATE: 104 BPM

## 2024-11-21 DIAGNOSIS — C53.8 MALIGNANT NEOPLASM OVERLAPPING CERVIX UTERI SITE (HCC): ICD-10-CM

## 2024-11-21 PROCEDURE — 700105 HCHG RX REV CODE 258

## 2024-11-21 PROCEDURE — 96375 TX/PRO/DX INJ NEW DRUG ADDON: CPT

## 2024-11-21 PROCEDURE — 96413 CHEMO IV INFUSION 1 HR: CPT

## 2024-11-21 PROCEDURE — 700111 HCHG RX REV CODE 636 W/ 250 OVERRIDE (IP)

## 2024-11-21 PROCEDURE — 304540 HCHG NITRO SET VENT 2ND TUB

## 2024-11-21 RX ORDER — ONDANSETRON 2 MG/ML
8 INJECTION INTRAMUSCULAR; INTRAVENOUS ONCE
Status: COMPLETED | OUTPATIENT
Start: 2024-11-21 | End: 2024-11-21

## 2024-11-21 RX ADMIN — HEPARIN 500 UNITS: 100 SYRINGE at 12:50

## 2024-11-21 RX ADMIN — ONDANSETRON 8 MG: 2 INJECTION INTRAMUSCULAR; INTRAVENOUS at 11:09

## 2024-11-21 RX ADMIN — SODIUM CHLORIDE 200 MG: 9 INJECTION, SOLUTION INTRAVENOUS at 11:45

## 2024-11-21 ASSESSMENT — FIBROSIS 4 INDEX: FIB4 SCORE: 1.11

## 2024-11-21 NOTE — PROGRESS NOTES
"Pharmacy chemotherapy verification:    Dx: cervical cancer overlapping uteri         Protocol: Cisplatin/ etoposide     Cisplatin 75mg/m2 IV over 2 hours on day 1  Etoposide 100mg/m2 IV over 60 min daily on days 1-3  Q21-28 days x 4-6 cycles  NCCN Guidelines for Cervical cancer V.1.2024  Sean WK, et al - J Clin Oncol. 1985 Nov;3(11):1471-7.  Emmanuelle DR, et al - J Clin Oncol. 2011 Jun 1;29(16):2215-22.  Noah HB, et al - J Clin Oncol. 2005 Jun 1;23(16):3422-9.  Moshe PE et al - Eur J Cancer Clin Oncol. 1987 Sep;23(9):1409-11.  Amelia G et al - Eur J Cancer Clin Oncol. 1987 Nov;23(11):1697-9.  Renetta S et al - Gynecol Oncol Rep. 2022 Aug 4:43:692133.    Allergies:  Morphine and Morphine     /60   Pulse (!) 104   Temp 36.1 °C (97 °F) (Temporal)   Resp 18   Ht 1.53 m (5' 0.24\")   Wt 97 kg (213 lb 13.5 oz)   SpO2 98%   BMI 41.44 kg/m²  Body surface area is 2.03 meters squared.    Labs 11/20/24:  ANC~ 3390 Plt = 395k   Hgb = 9.3     SCr = 0.71 mg/dL CrCl ~ 110 mL/min   K = 3.9  Mag = 1.9  AST/ALT/ALP = 22/12/55 Tbili = 0.4    Drug Order   (Drug name, dose, route, IV Fluid & volume, frequency, number of doses) Cycle 2 Day 2 of 3   Previous treatment: none     Medication = cisplatin  Base Dose= 75mg/m2  Calc Dose: Base Dose x -- m2 = -- mg  Final Dose = -- mg  Route = IV  Fluid & Volume =  mL  Admin Duration = Over 120 mins   Day 1 only       <10% difference, ok to treat with final dose   Medication = etoposide  Base Dose= 100mg/m2   Calc Dose: Base Dose x 2.03 m2 = 203 mg  Final Dose = 200 mg  Route = IV  Fluid & Volume =  mL  Admin Duration = Over 2 hours   Days 1-3       <10% difference, ok to treat with final dose   By my signature below, I confirm this process was performed independently with the BSA and all final chemotherapy dosing calculations congruent. I have reviewed the above chemotherapy order and that my calculation of the final dose and BSA (when applicable) corroborate " those calculations of the  pharmacist. Discrepancies of 10% or greater in the written dose have been addressed and documented within the EPIC Progress notes.    Twan Terry, PharmD

## 2024-11-21 NOTE — PROGRESS NOTES
Ms Barrett is here today for day 2 of etoposide.    She reports tolerating treatment well.   Has some nausea this morning.     Pt presented with medi port still accessed, flushed well with good blood return.    Zofran given IVP.     Etoposide administered per MAR over one hour and was tolerated well.     Medi port was flushed, HL and pt wished to leave the mulligan in place for tomorrow.    Discharged in stable condition.

## 2024-11-21 NOTE — PROGRESS NOTES
[facility] for Day 1/ Cycle 2 of Cisplatin/Gemzar for malignant neoplasm overlapping cervix uteri site. Pt denied having any new or acute complaints today, reports tolerating past treatments well. Pt reports port placement yesterday, site accessed and tender. POC reviewed.    Port flushed, brisk blood return noted, labs drawn off line, Pt tolerated well. Pre-hydration administered per MD order, Pt tolerated well. Lab results reviewed and within parameters for treatment.     Premeds administered per order, Pt tolerated well. Pt given Cisplatin followed by Etoposide and post hydration as prescribed, tolerated well, denied having any complaints during or after infusion.    Port flushed, heparinized and left accessed per tomorrows treatment. Confirmed next appointment and Diane was discharged home with Jalil in no acute distress.

## 2024-11-21 NOTE — PROGRESS NOTES
Chemotherapy Verification - PRIMARY RN      Height = 1.53 m  Weight = 97 kg  BSA = 2.03 m2       Medication: etoposide  Dose: 100mg/m2  Calculated Dose: 203 mg                             (In mg/m2, AUC, mg/kg)     I confirm this process was performed independently with the BSA and all final chemotherapy dosing calculations congruent.  Any discrepancies of 10% or greater have been addressed with the chemotherapy pharmacist. The resolution of the discrepancy has been documented in the EPIC progress notes.

## 2024-11-21 NOTE — PROGRESS NOTES
Chemotherapy Verification - SECONDARY RN       Height = 153cm  Weight = 97kg  BSA = 2.03       Medication: Etoposide  Dose: 100mg/m2  Calculated Dose: 203mg                             (In mg/m2, AUC, mg/kg)       I confirm that this process was performed independently.

## 2024-11-22 ENCOUNTER — OUTPATIENT INFUSION SERVICES (OUTPATIENT)
Dept: ONCOLOGY | Facility: MEDICAL CENTER | Age: 69
End: 2024-11-22
Payer: MEDICARE

## 2024-11-22 VITALS
HEIGHT: 60 IN | WEIGHT: 209.66 LBS | TEMPERATURE: 96.9 F | SYSTOLIC BLOOD PRESSURE: 122 MMHG | OXYGEN SATURATION: 92 % | BODY MASS INDEX: 41.16 KG/M2 | HEART RATE: 87 BPM | RESPIRATION RATE: 18 BRPM | DIASTOLIC BLOOD PRESSURE: 56 MMHG

## 2024-11-22 DIAGNOSIS — C53.8 MALIGNANT NEOPLASM OVERLAPPING CERVIX UTERI SITE (HCC): ICD-10-CM

## 2024-11-22 PROCEDURE — 700105 HCHG RX REV CODE 258

## 2024-11-22 PROCEDURE — 700111 HCHG RX REV CODE 636 W/ 250 OVERRIDE (IP): Mod: JZ,JG

## 2024-11-22 PROCEDURE — 96375 TX/PRO/DX INJ NEW DRUG ADDON: CPT

## 2024-11-22 PROCEDURE — 96377 APPLICATON ON-BODY INJECTOR: CPT

## 2024-11-22 PROCEDURE — 96413 CHEMO IV INFUSION 1 HR: CPT

## 2024-11-22 PROCEDURE — 304540 HCHG NITRO SET VENT 2ND TUB

## 2024-11-22 RX ORDER — ONDANSETRON 2 MG/ML
8 INJECTION INTRAMUSCULAR; INTRAVENOUS ONCE
Status: COMPLETED | OUTPATIENT
Start: 2024-11-22 | End: 2024-11-22

## 2024-11-22 RX ADMIN — HEPARIN 500 UNITS: 100 SYRINGE at 13:29

## 2024-11-22 RX ADMIN — SODIUM CHLORIDE 200 MG: 9 INJECTION, SOLUTION INTRAVENOUS at 12:04

## 2024-11-22 RX ADMIN — PEGFILGRASTIM 6 MG: KIT SUBCUTANEOUS at 13:29

## 2024-11-22 RX ADMIN — ONDANSETRON 8 MG: 2 INJECTION INTRAMUSCULAR; INTRAVENOUS at 11:35

## 2024-11-22 ASSESSMENT — FIBROSIS 4 INDEX: FIB4 SCORE: 1.11

## 2024-11-22 NOTE — PROGRESS NOTES
Chemotherapy Verification - PRIMARY RN      Height = 1.53m  Weight = 95.1kg  BSA = 2.01m2       Medication: etoposide (Toposar)  Dose: 100mg/m2  Calculated Dose: 201mg                             (In mg/m2, AUC, mg/kg)         I confirm this process was performed independently with the BSA and all final chemotherapy dosing calculations congruent.  Any discrepancies of 10% or greater have been addressed with the chemotherapy pharmacist. The resolution of the discrepancy has been documented in the EPIC progress notes.

## 2024-11-22 NOTE — PROGRESS NOTES
Chemotherapy Verification - SECONDARY RN       Height = 153 cm  Weight = 95.1 kg  BSA = 2.01 m2       Medication: Etoposide  Dose: 100 mg/m2  Calculated Dose: 201 mg                             (In mg/m2, AUC, mg/kg)       I confirm that this process was performed independently.

## 2024-11-22 NOTE — PROGRESS NOTES
"Pharmacy chemotherapy verification:    Dx: Cervical cancer overlapping uteri         Protocol: Cisplatin/ etoposide     *Dosing Reference*  Cisplatin 75 mg/m2 IV over 2 hours on day 1  Etoposide 100 mg/m2 IV over 60 min daily on days 1-3  Q21-28 days x 4-6 cycles  NCCN Guidelines for Cervical cancer V.1.2024  Sean WK, et al - J Clin Oncol. 1985 Nov;3(11):1471-7.  Emmanuelle DR, et al - J Clin Oncol. 2011 Jun 1;29(16):2215-22.  Noah HB, et al - J Clin Oncol. 2005 Jun 1;23(16):4142-9.  Moshe PE, et al - Eur J Cancer Clin Oncol. 1987 Sep;23(9):1409-11.  Amelia G et al - Eur J Cancer Clin Oncol. 1987 Nov;23(11):1697-9.  Renetta S et al - Gynecol Oncol Rep. 2022 Aug 4:43:300129.    Allergies:  Morphine and Morphine     /56   Pulse 87   Temp 36.1 °C (96.9 °F) (Temporal)   Resp 18   Ht 1.53 m (5' 0.24\")   Wt 95.1 kg (209 lb 10.5 oz)   SpO2 92%   BMI 40.63 kg/m²  Body surface area is 2.01 meters squared.    Labs 11/20/24:  ANC~ 3390 Plt = 395k   Hgb = 9.3     SCr = 0.71 mg/dL CrCl ~ 110 mL/min   K = 3.9  Mag = 1.9  AST/ALT/ALP = 22/12/55 Tbili = 0.4    Drug Order   (Drug name, dose, route, IV Fluid & volume, frequency, number of doses) Cycle 2 Day 3 of 3   Previous treatment: n/a     Medication = cisplatin  Base Dose= 75 mg/m2  Calc Dose: Base Dose x 2 m2 = 150 mg  Final Dose = 150 mg  Route = IV  Fluid & Volume =  mL  Admin Duration = Over 120 minutes   Day 1 only       <10% difference, ok to treat with final dose   Medication = etoposide  Base Dose= 100 mg/m2   Calc Dose: Base Dose x 2.01 m2 = 201 mg  Final Dose = 200 mg  Route = IV  Fluid & Volume =  mL  Admin Duration = Over 2 hours   Days 1-3       <10% difference, ok to treat with final dose     By my signature below, I confirm this process was performed independently with the BSA and all final chemotherapy dosing calculations congruent. I have reviewed the above chemotherapy order and that my calculation of the final dose and BSA (when " applicable) corroborate those calculations of the  pharmacist. Discrepancies of 10% or greater in the written dose have been addressed and documented within the EPIC Progress notes.    Natalia Oswald, NaeemD

## 2024-11-23 NOTE — PROGRESS NOTES
Patient came into infusion independently. Orders and vitals reviewed, assessment done. Port was accessed as previous appointment. Port flushed with blood return noted. Labs collected and reviewed, patient meets parameters to proceed with treatment today. Cycle 2 day 3 of Etoposide treatment given over 1 hour as ordered with no adverse events. Port flushed, heparin locked and de-accessed. Onpro injector applied to back of right arm and confirmed flashing green light. Patient left in stable condition with next appointment confirmed.

## 2024-11-26 ENCOUNTER — TELEPHONE (OUTPATIENT)
Dept: RADIATION ONCOLOGY | Facility: MEDICAL CENTER | Age: 69
End: 2024-11-26
Payer: COMMERCIAL

## 2024-11-26 NOTE — TELEPHONE ENCOUNTER
Nut[facility] Services: Brief Update / Telephone Encounter     Diane Barrett is a 69 y.o. female with diagnosis of malignant neoplasm of overlapping cervix    Wt Readings from Last 7 Encounters:   11/22/24 95.1 kg (209 lb 10.5 oz)   11/21/24 97 kg (213 lb 13.5 oz)   11/20/24 94 kg (207 lb 3.7 oz)   11/19/24 95.3 kg (210 lb)   11/01/24 101 kg (221 lb 9 oz)   10/31/24 100 kg (220 lb 10.9 oz)   10/30/24 98.5 kg (217 lb 2.5 oz)     Weight Change: potential 4.5 kg loss within past 1 month per [facility] scales. This is a 4.5% loss, while this does not meet criteria for significance, it is worth noting during active treatment.     RD attempted to call pt for nutrition check in. Pt's number did not connect with busy dial tone. RD attempted 10 minutes later and busy dial tone continued.     RD available for consultation PRN  [phone]

## 2024-11-27 RX ORDER — 0.9 % SODIUM CHLORIDE 0.9 %
VIAL (ML) INJECTION PRN
OUTPATIENT
Start: 2024-12-12

## 2024-11-27 RX ORDER — SODIUM CHLORIDE 9 MG/ML
INJECTION, SOLUTION INTRAVENOUS CONTINUOUS
OUTPATIENT
Start: 2024-12-14

## 2024-11-27 RX ORDER — DIPHENHYDRAMINE HYDROCHLORIDE 50 MG/ML
50 INJECTION INTRAMUSCULAR; INTRAVENOUS PRN
OUTPATIENT
Start: 2024-12-12

## 2024-11-27 RX ORDER — 0.9 % SODIUM CHLORIDE 0.9 %
10 VIAL (ML) INJECTION PRN
OUTPATIENT
Start: 2024-12-12

## 2024-11-27 RX ORDER — EPINEPHRINE 1 MG/ML(1)
0.5 AMPUL (ML) INJECTION PRN
OUTPATIENT
Start: 2024-12-13

## 2024-11-27 RX ORDER — SODIUM CHLORIDE 9 MG/ML
1000 INJECTION, SOLUTION INTRAVENOUS ONCE
OUTPATIENT
Start: 2024-12-12

## 2024-11-27 RX ORDER — 0.9 % SODIUM CHLORIDE 0.9 %
VIAL (ML) INJECTION PRN
OUTPATIENT
Start: 2024-12-11

## 2024-11-27 RX ORDER — SODIUM CHLORIDE 9 MG/ML
INJECTION, SOLUTION INTRAVENOUS CONTINUOUS
OUTPATIENT
Start: 2024-12-12

## 2024-11-27 RX ORDER — 0.9 % SODIUM CHLORIDE 0.9 %
3 VIAL (ML) INJECTION PRN
OUTPATIENT
Start: 2024-12-13

## 2024-11-27 RX ORDER — PROCHLORPERAZINE MALEATE 10 MG
10 TABLET ORAL EVERY 6 HOURS PRN
OUTPATIENT
Start: 2024-12-13

## 2024-11-27 RX ORDER — METHYLPREDNISOLONE SODIUM SUCCINATE 125 MG/2ML
125 INJECTION, POWDER, LYOPHILIZED, FOR SOLUTION INTRAMUSCULAR; INTRAVENOUS PRN
OUTPATIENT
Start: 2024-12-14

## 2024-11-27 RX ORDER — 0.9 % SODIUM CHLORIDE 0.9 %
VIAL (ML) INJECTION PRN
OUTPATIENT
Start: 2024-12-13

## 2024-11-27 RX ORDER — METHYLPREDNISOLONE SODIUM SUCCINATE 125 MG/2ML
125 INJECTION, POWDER, LYOPHILIZED, FOR SOLUTION INTRAMUSCULAR; INTRAVENOUS PRN
OUTPATIENT
Start: 2024-12-12

## 2024-11-27 RX ORDER — 0.9 % SODIUM CHLORIDE 0.9 %
3 VIAL (ML) INJECTION PRN
OUTPATIENT
Start: 2024-12-14

## 2024-11-27 RX ORDER — EPINEPHRINE 1 MG/ML(1)
0.5 AMPUL (ML) INJECTION PRN
OUTPATIENT
Start: 2024-12-12

## 2024-11-27 RX ORDER — 0.9 % SODIUM CHLORIDE 0.9 %
10 VIAL (ML) INJECTION PRN
OUTPATIENT
Start: 2024-12-14

## 2024-11-27 RX ORDER — 0.9 % SODIUM CHLORIDE 0.9 %
3 VIAL (ML) INJECTION PRN
OUTPATIENT
Start: 2024-12-11

## 2024-11-27 RX ORDER — 0.9 % SODIUM CHLORIDE 0.9 %
3 VIAL (ML) INJECTION PRN
OUTPATIENT
Start: 2024-12-12

## 2024-11-27 RX ORDER — ONDANSETRON 8 MG/1
8 TABLET, ORALLY DISINTEGRATING ORAL PRN
OUTPATIENT
Start: 2024-12-13

## 2024-11-27 RX ORDER — ONDANSETRON 2 MG/ML
8 INJECTION INTRAMUSCULAR; INTRAVENOUS ONCE
OUTPATIENT
Start: 2024-12-13 | End: 2024-12-13

## 2024-11-27 RX ORDER — METHYLPREDNISOLONE SODIUM SUCCINATE 125 MG/2ML
125 INJECTION, POWDER, LYOPHILIZED, FOR SOLUTION INTRAMUSCULAR; INTRAVENOUS PRN
OUTPATIENT
Start: 2024-12-13

## 2024-11-27 RX ORDER — EPINEPHRINE 1 MG/ML(1)
0.5 AMPUL (ML) INJECTION PRN
OUTPATIENT
Start: 2024-12-14

## 2024-11-27 RX ORDER — DIPHENHYDRAMINE HYDROCHLORIDE 50 MG/ML
50 INJECTION INTRAMUSCULAR; INTRAVENOUS PRN
OUTPATIENT
Start: 2024-12-13

## 2024-11-27 RX ORDER — ONDANSETRON 2 MG/ML
4 INJECTION INTRAMUSCULAR; INTRAVENOUS PRN
OUTPATIENT
Start: 2024-12-12

## 2024-11-27 RX ORDER — 0.9 % SODIUM CHLORIDE 0.9 %
10 VIAL (ML) INJECTION PRN
OUTPATIENT
Start: 2024-12-13

## 2024-11-27 RX ORDER — ONDANSETRON 2 MG/ML
4 INJECTION INTRAMUSCULAR; INTRAVENOUS PRN
OUTPATIENT
Start: 2024-12-13

## 2024-11-27 RX ORDER — 0.9 % SODIUM CHLORIDE 0.9 %
10 VIAL (ML) INJECTION PRN
OUTPATIENT
Start: 2024-12-11

## 2024-11-27 RX ORDER — SODIUM CHLORIDE 9 MG/ML
INJECTION, SOLUTION INTRAVENOUS CONTINUOUS
OUTPATIENT
Start: 2024-12-13

## 2024-11-27 RX ORDER — PROCHLORPERAZINE MALEATE 10 MG
10 TABLET ORAL EVERY 6 HOURS PRN
OUTPATIENT
Start: 2024-12-14

## 2024-11-27 RX ORDER — PROCHLORPERAZINE MALEATE 10 MG
10 TABLET ORAL EVERY 6 HOURS PRN
OUTPATIENT
Start: 2024-12-12

## 2024-11-27 RX ORDER — DIPHENHYDRAMINE HYDROCHLORIDE 50 MG/ML
50 INJECTION INTRAMUSCULAR; INTRAVENOUS PRN
OUTPATIENT
Start: 2024-12-14

## 2024-11-27 RX ORDER — 0.9 % SODIUM CHLORIDE 0.9 %
VIAL (ML) INJECTION PRN
OUTPATIENT
Start: 2024-12-14

## 2024-11-27 RX ORDER — ONDANSETRON 2 MG/ML
8 INJECTION INTRAMUSCULAR; INTRAVENOUS ONCE
OUTPATIENT
Start: 2024-12-14 | End: 2024-12-14

## 2024-11-27 RX ORDER — ONDANSETRON 8 MG/1
8 TABLET, ORALLY DISINTEGRATING ORAL PRN
OUTPATIENT
Start: 2024-12-14

## 2024-11-27 RX ORDER — ONDANSETRON 8 MG/1
8 TABLET, ORALLY DISINTEGRATING ORAL PRN
OUTPATIENT
Start: 2024-12-12

## 2024-11-27 RX ORDER — ONDANSETRON 2 MG/ML
4 INJECTION INTRAMUSCULAR; INTRAVENOUS PRN
OUTPATIENT
Start: 2024-12-14

## 2024-12-02 ENCOUNTER — PATIENT OUTREACH (OUTPATIENT)
Dept: ONCOLOGY | Facility: MEDICAL CENTER | Age: 69
End: 2024-12-02
Payer: COMMERCIAL

## 2024-12-02 NOTE — PROGRESS NOTES
On December 2nd, 2024, Oncology Social Worker Jane Song returned telephone call from pt. inquiring about lodging assistance.  RADHA Song introduced herself, role and reason for call.  Pt. shared she had been given RADHA Song's contact information to reach out for assistance.  Pt. shared she is coming from [place], [place] and will  have 3 days in a row of chemotherapy.  Pt. was wondering about resources.   RADHA Song informed pt. she could email her application for lodging and gas card assistance.  Pt. confirmed getting applications emailed would be best.  RADHA Song informed pt. once she receives the applications she is able to make room reservations at the [facility].  Pt. shared she had already made reservations for the [facility].  RADHA Song explained in the future pt. will need to call her with dates and RADHA Song will make reservations.  RADHA Song explained she will change the billing on those reservations once she receives the application.  Pt. agreed to complete applications.      RADHA Song emailed pt. Counts include 234 beds at the [facility] Transportation kieran application and [facility] application.

## 2024-12-03 ENCOUNTER — PATIENT OUTREACH (OUTPATIENT)
Dept: ONCOLOGY | Facility: MEDICAL CENTER | Age: 69
End: 2024-12-03
Payer: COMMERCIAL

## 2024-12-03 NOTE — PROGRESS NOTES
On December 3rd, 2024, Oncology Social Worker Jane Song processed pt.'s [facility] Patient Transportation Thanh application.  Pt. will receive $200 in gas card to assist with transportation.  OSW Duncan mailed gas card to pt.

## 2024-12-05 ENCOUNTER — TELEPHONE (OUTPATIENT)
Dept: HEMATOLOGY ONCOLOGY | Facility: MEDICAL CENTER | Age: 69
End: 2024-12-05
Payer: COMMERCIAL

## 2024-12-05 NOTE — TELEPHONE ENCOUNTER
"Nutrition Services: RD Consultation   Diane Barrett is a 69 y.o. female with diagnosis of neoplasm of cervix       Nutrition Assessment:      RD able to introduce self and nutrition services.  Pt reports decreased appetite since starting treatment although recently starting to improve. Pt reports consuming smaller portions of meals. Pt reports consistent nausea. Pt reports dislike of oral supplements, therefore not utilized. Pt reports +constipation, utilizing stool softener every other day. Pt reports having difficult with adequate water intake although enjoys drinking 1-2 glasses of water per day. Pt reports no difficulty chewing and/or swallowing. Pt reports UBW 200lbs with a weight loss of 10-15lb since starting treatment. Pt not concerned for weight loss.     Discussed importance of increased nutrient needs .Discussed alternative fluid recommendations. Discussed concern for weight loss during treatment.       Past Medical History:   Diagnosis Date    Anesthesia     PONV    Anxiety     Arrhythmia     \"extra heart beat  but has never been a problem\"    Cancer (HCC)     rare cervical (neroendocrio)-getting chemo    Pain     hand    PONV (postoperative nausea and vomiting)     nausea    Snoring     Thalassemia minor        Past Surgical History:   Procedure Laterality Date    KNEE ARTHROPLASTY TOTAL Left 10/15/2018    Procedure: LEFT TOTAL KNEE REPLACEMENT;  Surgeon: Vu Iverson M.D.;  Location: SURGERY [facility];  Service: Orthopedics    DUPUYTREN CONTRACTURE RELEASE  09/17/2012    Performed by Shyam Orr M.D. at SURGERY [facility] ORS    FULL THICKNESS SKIN GRAFT  09/17/2012    Performed by Shaym Orr M.D. at [facility] ORS    IRRIGATION & DEBRIDEMENT ORTHO  03/12/2012    Performed by SHYAM ORR at [facility] ORS    FINGER OR HAND INCISION AND DRAINAGE  02/13/2012    Performed by SHYAM ORR at SURGERY [facility] ORS    LACERATION REPAIR  02/13/2012    " "Performed by CANDICE ORR at SURGERY [facility] ORS    CARPAL TUNNEL RELEASE  2012    Performed by CANDICE ORR at SURGERY [facility] ORS    MUSCLE REPAIR  2012    Performed by CANDICE ORR at SURGERY [facility]    HAND SURGERY  2012    left hand trauma    TUBAL LIGATION      PRIMARY C SECTION          KNEE ARTHROSCOPY Left     years prior to replacement    TONSILLECTOMY      as child       Biochemical/ Anthropometrics  Treatment Regimen: CISplatin + etoposide (75/100) +/- XRT     Wt Readings from Last 1 Encounters:   24 95.1 kg (209 lb 10.5 oz)      Ht Readings from Last 1 Encounters:   24 1.53 m (5' 0.24\")      BMI Readings from Last 1 Encounters:   24 40.63 kg/m²   BMI Classification: Obese  Ideal Body weight: 100lb/45.5kg  Nutrition-Focused Physical Exam: Did not occur    Estimated Nutrition Needs:   Estimated Energy Needs: 1138-1593kal (25-35kcal/IBWkg)   Estimated Protein Needs: 55-68gm (1.2-1.5gm/IBWkg)   Estimated Fluid Needs: Minimum 1500ml    Weight History:  Wt Readings from Last 6 Encounters:   24 95.1 kg (209 lb 10.5 oz)   24 97 kg (213 lb 13.5 oz)   24 94 kg (207 lb 3.7 oz)   24 95.3 kg (210 lb)   24 101 kg (221 lb 9 oz)   10/31/24 100 kg (220 lb 10.9 oz)        Nutrition Diagnosis:      Increased nutrient needs related to increase demand as evidence by neoplasm with treatment          Nutrition Interventions:      Introduced self and discussed role of dietitian throughout treatment process   Discussed general nutrition guidelines as well as nutritional recommendations for patient's with cancer, including tips/tricks should side effects of tx occur.   Encouraged balanced diet.   Encouraged physical activity as tolerated with emphasis on reducing long periods of sedentary activity as able.   Increase meal and snack frequency to 4-6 x/day.   Focus on high protein foods, fruits and vegetables with all " meals/snacks   RD provided contact information. Encouraged pt to reach out as questions/concerns arise.     Nutrition Monitoring and Evaluation:     Dietary pattern as prescribed by RD  Weight stabilization throughout treatment  Appropriate management of side effects through medication management and nutrition modification as warranted      Pt reports understanding and was receptive to information provided.   RD available PRN   [phone]

## 2024-12-10 NOTE — PROGRESS NOTES
"Pharmacy Chemotherapy Calculations    Dx: Cervical cancer    Cycle 3, Day 1-3  Previous treatment = C1  D1-3 Oct Nov 20-22, 2024    Protocol: Cisplatin/Etoposide  Cisplatin 75 mg/m2 IV over 2 hours on Day 1  Etoposide 100 mg/m2 IV over 60 minutes daily on Days 1-3   21- to 28-day cycle for 4-6 cycles  NCCN Guidelines for Cervical Cancer V.1.2024.  Sean WK, et al. J Clin Oncol. 1985;3(11):1471-7.  Emmanuelle SUAREZ, et al. J Clin Oncol. 2011;29(16):2215-22.  Noah HB, et al. J Clin Oncol. 2005;23(16):3752- 9.  Postm PE, et al. Eur J Cancer Clin Oncol. 1987;23(9):1409-11.  Amelia G, et al. Eur J Cancer Clin Oncol. 1987;23(11):1697-9.  Maryjanehandas S , et al. Gynecol Oncol Rep. 2022;43:119246.    Allergies:  Morphine and Morphine       /72   Pulse (!) 120   Temp 36.7 °C (98 °F) (Temporal)   Resp 18   Ht 1.53 m (5' 0.24\")   Wt 94 kg (207 lb 3.7 oz)   SpO2 96%   BMI 40.16 kg/m²  Body surface area is 2 meters squared.    All lab results 12/11/24 within treatment parameters. Except HGB < 8  MD aware of all current lab results. Orders received to proceed with treatment.     Cisplatin 75 mg/m² x 2 m² = 150 mg   <10% difference, OK to treat with final dose = 150 mg on Day 1 only    Etoposide 100 mg/m² x 2 m² = 200 mg   <10% difference, OK to treat with final dose = 200 mg on Days 1-3    Raymond Hilliard, PharmD  "

## 2024-12-10 NOTE — PROGRESS NOTES
"Pharmacy chemotherapy verification:    Dx: Cervical cancer overlapping uteri         Protocol: Cisplatin/ etoposide     *Dosing Reference*  Cisplatin 75 mg/m2 IV over 2 hours on day 1  Etoposide 100 mg/m2 IV over 60 min daily on days 1-3  Q21-28 days x 4-6 cycles  NCCN Guidelines for Cervical cancer V.1.2024  Sean WK, et al - J Clin Oncol. 1985 Nov;3(11):1471-7.  Emmanuelle DR, et al - J Clin Oncol. 2011 Jun 1;29(16):2215-22.  Noah HB, et al - J Clin Oncol. 2005 Jun 1;23(16):2702-9.  Moshe PE, et al - Eur J Cancer Clin Oncol. 1987 Sep;23(9):1409-11.  Amelia G et al - Eur J Cancer Clin Oncol. 1987 Nov;23(11):1697-9.  Renetta S et al - Gynecol Oncol Rep. 2022 Aug 4:43:[id number].    Allergies:  Morphine and Morphine     /72   Pulse (!) 120   Temp 36.7 °C (98 °F) (Temporal)   Resp 18   Ht 1.53 m (5' 0.24\")   Wt 94 kg (207 lb 3.7 oz)   SpO2 96%   BMI 40.16 kg/m²  Body surface area is 2 meters squared.    Labs 12/11/24:  ANC~ 2740 Plt = 272k   Hgb = 7.9*     SCr = 0.68 mg/dL CrCl ~ 112 mL/min   K = 4.1  Mag = 1.5 - replaced per protocol  AST/ALT/ALP = 18/11/67 Tbili = 0.3  *Ok to proceed with treatment today per MD    Drug Order   (Drug name, dose, route, IV Fluid & volume, frequency, number of doses) Cycle 3 Day 1 of 3   Previous treatment: C2 11/20-11/22/24     Medication = cisplatin  Base Dose= 75 mg/m2  Calc Dose: Base Dose x 2 m2 = 150 mg  Final Dose = 150 mg  Route = IV  Fluid & Volume =  mL  Admin Duration = Over 120 minutes   Day 1 only       <10% difference, ok to treat with final dose   Medication = etoposide  Base Dose= 100 mg/m2   Calc Dose: Base Dose x 2 m2 = 200 mg  Final Dose = 200 mg  Route = IV  Fluid & Volume =  mL  Admin Duration = Over 1 hour   Days 1-3       <10% difference, ok to treat with final dose   By my signature below, I confirm this process was performed independently with the BSA and all final chemotherapy dosing calculations congruent. I have reviewed the above " chemotherapy order and that my calculation of the final dose and BSA (when applicable) corroborate those calculations of the  pharmacist. Discrepancies of 10% or greater in the written dose have been addressed and documented within the EPIC Progress notes.    Twan Terry, PharmD

## 2024-12-11 ENCOUNTER — OUTPATIENT INFUSION SERVICES (OUTPATIENT)
Dept: ONCOLOGY | Facility: MEDICAL CENTER | Age: 69
End: 2024-12-11
Attending: SPECIALIST
Payer: MEDICARE

## 2024-12-11 VITALS
DIASTOLIC BLOOD PRESSURE: 72 MMHG | HEART RATE: 88 BPM | OXYGEN SATURATION: 96 % | BODY MASS INDEX: 40.69 KG/M2 | WEIGHT: 207.23 LBS | SYSTOLIC BLOOD PRESSURE: 138 MMHG | HEIGHT: 60 IN | TEMPERATURE: 98 F | RESPIRATION RATE: 18 BRPM

## 2024-12-11 DIAGNOSIS — C53.8 MALIGNANT NEOPLASM OVERLAPPING CERVIX UTERI SITE (HCC): ICD-10-CM

## 2024-12-11 LAB
ALBUMIN SERPL BCP-MCNC: 3.9 G/DL (ref 3.2–4.9)
ALBUMIN/GLOB SERPL: 1.3 G/DL
ALP SERPL-CCNC: 67 U/L (ref 30–99)
ALT SERPL-CCNC: 11 U/L (ref 2–50)
ANION GAP SERPL CALC-SCNC: 11 MMOL/L (ref 7–16)
AST SERPL-CCNC: 18 U/L (ref 12–45)
BASOPHILS # BLD AUTO: 0.7 % (ref 0–1.8)
BASOPHILS # BLD: 0.04 K/UL (ref 0–0.12)
BILIRUB SERPL-MCNC: 0.3 MG/DL (ref 0.1–1.5)
BUN SERPL-MCNC: 13 MG/DL (ref 8–22)
CALCIUM ALBUM COR SERPL-MCNC: 9.6 MG/DL (ref 8.5–10.5)
CALCIUM SERPL-MCNC: 9.5 MG/DL (ref 8.5–10.5)
CHLORIDE SERPL-SCNC: 103 MMOL/L (ref 96–112)
CO2 SERPL-SCNC: 24 MMOL/L (ref 20–33)
CREAT SERPL-MCNC: 0.68 MG/DL (ref 0.5–1.4)
EOSINOPHIL # BLD AUTO: 0.06 K/UL (ref 0–0.51)
EOSINOPHIL NFR BLD: 1.1 % (ref 0–6.9)
ERYTHROCYTE [DISTWIDTH] IN BLOOD BY AUTOMATED COUNT: 48.6 FL (ref 35.9–50)
GFR SERPLBLD CREATININE-BSD FMLA CKD-EPI: 94 ML/MIN/1.73 M 2
GLOBULIN SER CALC-MCNC: 3.1 G/DL (ref 1.9–3.5)
GLUCOSE SERPL-MCNC: 109 MG/DL (ref 65–99)
HCT VFR BLD AUTO: 25.5 % (ref 37–47)
HGB BLD-MCNC: 7.9 G/DL (ref 12–16)
IMM GRANULOCYTES # BLD AUTO: 0.03 K/UL (ref 0–0.11)
IMM GRANULOCYTES NFR BLD AUTO: 0.5 % (ref 0–0.9)
LYMPHOCYTES # BLD AUTO: 2 K/UL (ref 1–4.8)
LYMPHOCYTES NFR BLD: 36.2 % (ref 22–41)
MAGNESIUM SERPL-MCNC: 1.5 MG/DL (ref 1.5–2.5)
MCH RBC QN AUTO: 21.8 PG (ref 27–33)
MCHC RBC AUTO-ENTMCNC: 31 G/DL (ref 32.2–35.5)
MCV RBC AUTO: 70.2 FL (ref 81.4–97.8)
MONOCYTES # BLD AUTO: 0.66 K/UL (ref 0–0.85)
MONOCYTES NFR BLD AUTO: 11.9 % (ref 0–13.4)
NEUTROPHILS # BLD AUTO: 2.74 K/UL (ref 1.82–7.42)
NEUTROPHILS NFR BLD: 49.6 % (ref 44–72)
NRBC # BLD AUTO: 0.03 K/UL
NRBC BLD-RTO: 0.5 /100 WBC (ref 0–0.2)
OUTPT INFUS CBC COMMENT OICOM: ABNORMAL
PLATELET # BLD AUTO: 272 K/UL (ref 164–446)
PMV BLD AUTO: 10 FL (ref 9–12.9)
POTASSIUM SERPL-SCNC: 4.1 MMOL/L (ref 3.6–5.5)
PROT SERPL-MCNC: 7 G/DL (ref 6–8.2)
RBC # BLD AUTO: 3.63 M/UL (ref 4.2–5.4)
SODIUM SERPL-SCNC: 138 MMOL/L (ref 135–145)
WBC # BLD AUTO: 5.5 K/UL (ref 4.8–10.8)

## 2024-12-11 PROCEDURE — 96375 TX/PRO/DX INJ NEW DRUG ADDON: CPT

## 2024-12-11 PROCEDURE — 96413 CHEMO IV INFUSION 1 HR: CPT

## 2024-12-11 PROCEDURE — A4212 NON CORING NEEDLE OR STYLET: HCPCS

## 2024-12-11 PROCEDURE — 700105 HCHG RX REV CODE 258

## 2024-12-11 PROCEDURE — 96417 CHEMO IV INFUS EACH ADDL SEQ: CPT

## 2024-12-11 PROCEDURE — 96361 HYDRATE IV INFUSION ADD-ON: CPT

## 2024-12-11 PROCEDURE — 80053 COMPREHEN METABOLIC PANEL: CPT

## 2024-12-11 PROCEDURE — 96367 TX/PROPH/DG ADDL SEQ IV INF: CPT

## 2024-12-11 PROCEDURE — 83735 ASSAY OF MAGNESIUM: CPT

## 2024-12-11 PROCEDURE — 85025 COMPLETE CBC W/AUTO DIFF WBC: CPT

## 2024-12-11 PROCEDURE — 304540 HCHG NITRO SET VENT 2ND TUB

## 2024-12-11 PROCEDURE — 96366 THER/PROPH/DIAG IV INF ADDON: CPT

## 2024-12-11 PROCEDURE — 700111 HCHG RX REV CODE 636 W/ 250 OVERRIDE (IP)

## 2024-12-11 PROCEDURE — 96415 CHEMO IV INFUSION ADDL HR: CPT

## 2024-12-11 RX ORDER — DIPHENHYDRAMINE HYDROCHLORIDE 50 MG/ML
50 INJECTION INTRAMUSCULAR; INTRAVENOUS PRN
OUTPATIENT
Start: 2025-01-02

## 2024-12-11 RX ORDER — EPINEPHRINE 1 MG/ML(1)
0.5 AMPUL (ML) INJECTION PRN
OUTPATIENT
Start: 2025-01-02

## 2024-12-11 RX ORDER — 0.9 % SODIUM CHLORIDE 0.9 %
VIAL (ML) INJECTION PRN
OUTPATIENT
Start: 2025-01-02

## 2024-12-11 RX ORDER — 0.9 % SODIUM CHLORIDE 0.9 %
3 VIAL (ML) INJECTION PRN
OUTPATIENT
Start: 2025-01-01

## 2024-12-11 RX ORDER — EPINEPHRINE 1 MG/ML(1)
0.5 AMPUL (ML) INJECTION PRN
OUTPATIENT
Start: 2025-01-04

## 2024-12-11 RX ORDER — 0.9 % SODIUM CHLORIDE 0.9 %
VIAL (ML) INJECTION PRN
OUTPATIENT
Start: 2025-01-03

## 2024-12-11 RX ORDER — 0.9 % SODIUM CHLORIDE 0.9 %
3 VIAL (ML) INJECTION PRN
OUTPATIENT
Start: 2025-01-03

## 2024-12-11 RX ORDER — ONDANSETRON 2 MG/ML
4 INJECTION INTRAMUSCULAR; INTRAVENOUS PRN
OUTPATIENT
Start: 2025-01-02

## 2024-12-11 RX ORDER — SODIUM CHLORIDE 9 MG/ML
1000 INJECTION, SOLUTION INTRAVENOUS ONCE
Status: COMPLETED | OUTPATIENT
Start: 2024-12-11 | End: 2024-12-11

## 2024-12-11 RX ORDER — SODIUM CHLORIDE 9 MG/ML
INJECTION, SOLUTION INTRAVENOUS CONTINUOUS
OUTPATIENT
Start: 2025-01-02

## 2024-12-11 RX ORDER — 0.9 % SODIUM CHLORIDE 0.9 %
10 VIAL (ML) INJECTION PRN
OUTPATIENT
Start: 2025-01-02

## 2024-12-11 RX ORDER — ONDANSETRON 8 MG/1
8 TABLET, ORALLY DISINTEGRATING ORAL PRN
OUTPATIENT
Start: 2025-01-04

## 2024-12-11 RX ORDER — ONDANSETRON 2 MG/ML
4 INJECTION INTRAMUSCULAR; INTRAVENOUS PRN
OUTPATIENT
Start: 2025-01-03

## 2024-12-11 RX ORDER — METHYLPREDNISOLONE SODIUM SUCCINATE 125 MG/2ML
125 INJECTION, POWDER, LYOPHILIZED, FOR SOLUTION INTRAMUSCULAR; INTRAVENOUS PRN
OUTPATIENT
Start: 2025-01-02

## 2024-12-11 RX ORDER — PROCHLORPERAZINE MALEATE 10 MG
10 TABLET ORAL EVERY 6 HOURS PRN
OUTPATIENT
Start: 2025-01-04

## 2024-12-11 RX ORDER — ONDANSETRON 2 MG/ML
8 INJECTION INTRAMUSCULAR; INTRAVENOUS ONCE
OUTPATIENT
Start: 2025-01-03 | End: 2025-01-03

## 2024-12-11 RX ORDER — 0.9 % SODIUM CHLORIDE 0.9 %
10 VIAL (ML) INJECTION PRN
OUTPATIENT
Start: 2025-01-04

## 2024-12-11 RX ORDER — 0.9 % SODIUM CHLORIDE 0.9 %
3 VIAL (ML) INJECTION PRN
OUTPATIENT
Start: 2025-01-04

## 2024-12-11 RX ORDER — 0.9 % SODIUM CHLORIDE 0.9 %
VIAL (ML) INJECTION PRN
OUTPATIENT
Start: 2025-01-04

## 2024-12-11 RX ORDER — SODIUM CHLORIDE 9 MG/ML
INJECTION, SOLUTION INTRAVENOUS CONTINUOUS
OUTPATIENT
Start: 2025-01-04

## 2024-12-11 RX ORDER — ONDANSETRON 2 MG/ML
4 INJECTION INTRAMUSCULAR; INTRAVENOUS PRN
OUTPATIENT
Start: 2025-01-04

## 2024-12-11 RX ORDER — PROCHLORPERAZINE MALEATE 10 MG
10 TABLET ORAL EVERY 6 HOURS PRN
OUTPATIENT
Start: 2025-01-02

## 2024-12-11 RX ORDER — EPINEPHRINE 1 MG/ML(1)
0.5 AMPUL (ML) INJECTION PRN
OUTPATIENT
Start: 2025-01-03

## 2024-12-11 RX ORDER — 0.9 % SODIUM CHLORIDE 0.9 %
3 VIAL (ML) INJECTION PRN
OUTPATIENT
Start: 2025-01-02

## 2024-12-11 RX ORDER — 0.9 % SODIUM CHLORIDE 0.9 %
10 VIAL (ML) INJECTION PRN
OUTPATIENT
Start: 2025-01-01

## 2024-12-11 RX ORDER — ONDANSETRON 2 MG/ML
16 INJECTION INTRAMUSCULAR; INTRAVENOUS ONCE
Status: COMPLETED | OUTPATIENT
Start: 2024-12-11 | End: 2024-12-11

## 2024-12-11 RX ORDER — 0.9 % SODIUM CHLORIDE 0.9 %
10 VIAL (ML) INJECTION PRN
OUTPATIENT
Start: 2025-01-03

## 2024-12-11 RX ORDER — PROCHLORPERAZINE MALEATE 10 MG
10 TABLET ORAL EVERY 6 HOURS PRN
OUTPATIENT
Start: 2025-01-03

## 2024-12-11 RX ORDER — SODIUM CHLORIDE 9 MG/ML
1000 INJECTION, SOLUTION INTRAVENOUS ONCE
OUTPATIENT
Start: 2025-01-02

## 2024-12-11 RX ORDER — DIPHENHYDRAMINE HYDROCHLORIDE 50 MG/ML
50 INJECTION INTRAMUSCULAR; INTRAVENOUS PRN
OUTPATIENT
Start: 2025-01-03

## 2024-12-11 RX ORDER — SODIUM CHLORIDE 9 MG/ML
INJECTION, SOLUTION INTRAVENOUS CONTINUOUS
OUTPATIENT
Start: 2025-01-03

## 2024-12-11 RX ORDER — METHYLPREDNISOLONE SODIUM SUCCINATE 125 MG/2ML
125 INJECTION, POWDER, LYOPHILIZED, FOR SOLUTION INTRAMUSCULAR; INTRAVENOUS PRN
OUTPATIENT
Start: 2025-01-04

## 2024-12-11 RX ORDER — METHYLPREDNISOLONE SODIUM SUCCINATE 125 MG/2ML
125 INJECTION, POWDER, LYOPHILIZED, FOR SOLUTION INTRAMUSCULAR; INTRAVENOUS PRN
OUTPATIENT
Start: 2025-01-03

## 2024-12-11 RX ORDER — ONDANSETRON 2 MG/ML
8 INJECTION INTRAMUSCULAR; INTRAVENOUS ONCE
OUTPATIENT
Start: 2025-01-04 | End: 2025-01-04

## 2024-12-11 RX ORDER — DEXAMETHASONE SODIUM PHOSPHATE 4 MG/ML
12 INJECTION, SOLUTION INTRA-ARTICULAR; INTRALESIONAL; INTRAMUSCULAR; INTRAVENOUS; SOFT TISSUE ONCE
Status: COMPLETED | OUTPATIENT
Start: 2024-12-11 | End: 2024-12-11

## 2024-12-11 RX ORDER — ONDANSETRON 8 MG/1
8 TABLET, ORALLY DISINTEGRATING ORAL PRN
OUTPATIENT
Start: 2025-01-03

## 2024-12-11 RX ORDER — ONDANSETRON 8 MG/1
8 TABLET, ORALLY DISINTEGRATING ORAL PRN
OUTPATIENT
Start: 2025-01-02

## 2024-12-11 RX ORDER — 0.9 % SODIUM CHLORIDE 0.9 %
VIAL (ML) INJECTION PRN
OUTPATIENT
Start: 2025-01-01

## 2024-12-11 RX ORDER — DIPHENHYDRAMINE HYDROCHLORIDE 50 MG/ML
50 INJECTION INTRAMUSCULAR; INTRAVENOUS PRN
OUTPATIENT
Start: 2025-01-04

## 2024-12-11 RX ADMIN — CISPLATIN 150 MG: 1 INJECTION INTRAVENOUS at 12:40

## 2024-12-11 RX ADMIN — HEPARIN 500 UNITS: 100 SYRINGE at 18:02

## 2024-12-11 RX ADMIN — FOSAPREPITANT 150 MG: 150 INJECTION, POWDER, LYOPHILIZED, FOR SOLUTION INTRAVENOUS at 11:51

## 2024-12-11 RX ADMIN — DEXAMETHASONE SODIUM PHOSPHATE 12 MG: 4 INJECTION INTRA-ARTICULAR; INTRALESIONAL; INTRAMUSCULAR; INTRAVENOUS; SOFT TISSUE at 12:35

## 2024-12-11 RX ADMIN — SODIUM CHLORIDE 200 MG: 9 INJECTION, SOLUTION INTRAVENOUS at 14:51

## 2024-12-11 RX ADMIN — POTASSIUM CHLORIDE: 2 INJECTION, SOLUTION, CONCENTRATE INTRAVENOUS at 15:57

## 2024-12-11 RX ADMIN — ONDANSETRON 16 MG: 2 INJECTION INTRAMUSCULAR; INTRAVENOUS at 12:25

## 2024-12-11 RX ADMIN — SODIUM CHLORIDE 1000 ML: 9 INJECTION, SOLUTION INTRAVENOUS at 10:45

## 2024-12-11 ASSESSMENT — FIBROSIS 4 INDEX: FIB4 SCORE: 1.11

## 2024-12-11 NOTE — PROGRESS NOTES
Chemotherapy Verification - PRIMARY RN    D1C3    Height = 153cm  Weight = 94kg  BSA = 2m^2       Medication: CISplatin  Dose: 75mg/m^2  Calculated Dose: 150 mg                                 Medication: etoposide  Dose: 100mg/m^2  Calculated Dose: 200 mg                                     I confirm this process was performed independently with the BSA and all final chemotherapy dosing calculations congruent.  Any discrepancies of 10% or greater have been addressed with the chemotherapy pharmacist. The resolution of the discrepancy has been documented in the EPIC progress notes.

## 2024-12-11 NOTE — PROGRESS NOTES
Chemotherapy Verification - SECONDARY RN       Height = 1.53m  Weight = 94kg  BSA = 2m2       Medication: cisplatin  Dose: 75mg/m2  Calculated Dose: 150mg                             (In mg/m2, AUC, mg/kg)     Medication: etoposide  Dose: 100mg/m2  Calculated Dose: 200mg                             (In mg/m2, AUC, mg/kg)        I confirm that this process was performed independently.

## 2024-12-12 ENCOUNTER — OUTPATIENT INFUSION SERVICES (OUTPATIENT)
Dept: ONCOLOGY | Facility: MEDICAL CENTER | Age: 69
End: 2024-12-12
Payer: MEDICARE

## 2024-12-12 VITALS
HEIGHT: 60 IN | BODY MASS INDEX: 41.46 KG/M2 | WEIGHT: 211.2 LBS | SYSTOLIC BLOOD PRESSURE: 139 MMHG | DIASTOLIC BLOOD PRESSURE: 70 MMHG | OXYGEN SATURATION: 93 % | RESPIRATION RATE: 18 BRPM | HEART RATE: 104 BPM | TEMPERATURE: 96.7 F

## 2024-12-12 DIAGNOSIS — C53.8 MALIGNANT NEOPLASM OVERLAPPING CERVIX UTERI SITE (HCC): ICD-10-CM

## 2024-12-12 PROCEDURE — 96375 TX/PRO/DX INJ NEW DRUG ADDON: CPT

## 2024-12-12 PROCEDURE — 700105 HCHG RX REV CODE 258

## 2024-12-12 PROCEDURE — 96413 CHEMO IV INFUSION 1 HR: CPT

## 2024-12-12 PROCEDURE — 700111 HCHG RX REV CODE 636 W/ 250 OVERRIDE (IP)

## 2024-12-12 RX ORDER — ONDANSETRON 2 MG/ML
8 INJECTION INTRAMUSCULAR; INTRAVENOUS ONCE
Status: COMPLETED | OUTPATIENT
Start: 2024-12-12 | End: 2024-12-12

## 2024-12-12 RX ADMIN — SODIUM CHLORIDE 200 MG: 9 INJECTION, SOLUTION INTRAVENOUS at 10:03

## 2024-12-12 RX ADMIN — HEPARIN 500 UNITS: 100 SYRINGE at 11:14

## 2024-12-12 RX ADMIN — ONDANSETRON 8 MG: 2 INJECTION INTRAMUSCULAR; INTRAVENOUS at 09:36

## 2024-12-12 ASSESSMENT — FIBROSIS 4 INDEX: FIB4 SCORE: 1.38

## 2024-12-12 NOTE — PROGRESS NOTES
Chemotherapy Verification - SECONDARY RN       Height = 153cm  Weight = 95.8kg  BSA = 2.02m^2       Medication: etoposide  Dose: 100mg/m^2  Calculated Dose: 202 mg                                 I confirm that this process was performed independently.

## 2024-12-12 NOTE — PROGRESS NOTES
Pt arrived ambulatory to [facility] for D1C3 CISplatin/etoposide infusions for cervical cancer. POC discussed with pt and she agrees with plan.     Port accessed in sterile fashion, brisk blood return noted, labs drawn as ordered. Results reviewed, Hgb 7.9 today pt does NOT meet parameters for treatment. Ok to proceed with treatment per Dr Rosario.     Pt medicated per MAR. Pt tolerated treatment without s/s adverse reaction. Port with brisk blood return, flushed with NS then heparin. Port left accessed for day 2 etoposide infusion tomorrow.     Pt discharged to self care, [facility]. Pt next appointment confirmed 12/12/2024.   No

## 2024-12-12 NOTE — PROGRESS NOTES
"Pharmacy chemotherapy verification:    Dx: Cervical cancer overlapping uteri         Protocol: Cisplatin/ etoposide     *Dosing Reference*  Cisplatin 75 mg/m2 IV over 2 hours on day 1  Etoposide 100 mg/m2 IV over 60 min daily on days 1-3  Q21-28 days x 4-6 cycles  NCCN Guidelines for Cervical cancer V.1.2024  Sean WK, et al - J Clin Oncol. 1985 Nov;3(11):1471-7.  Emmanuelle DR, et al - J Clin Oncol. 2011 Jun 1;29(16):2215-22.  Noah HB, et al - J Clin Oncol. 2005 Jun 1;23(16):2942-9.  Moshe PE, et al - Eur J Cancer Clin Oncol. 1987 Sep;23(9):1409-11.  Amelia G et al - Eur J Cancer Clin Oncol. 1987 Nov;23(11):1697-9.  Renetta S et al - Gynecol Oncol Rep. 2022 Aug 4:43:400431.    Allergies:  Morphine and Morphine     /70   Pulse (!) 104   Temp 35.9 °C (96.7 °F) (Temporal)   Resp 18   Ht 1.53 m (5' 0.24\")   Wt 95.8 kg (211 lb 3.2 oz)   SpO2 93%   BMI 40.92 kg/m²  Body surface area is 2.02 meters squared.    Labs 12/11/24:  ANC~ 2740 Plt = 272k   Hgb = 7.9*     SCr = 0.68 mg/dL CrCl ~ 112 mL/min   K = 4.1  Mag = 1.5 - replaced per protocol  AST/ALT/ALP = 18/11/67 Tbili = 0.3  *Ok to proceed with treatment today per MD    Drug Order   (Drug name, dose, route, IV Fluid & volume, frequency, number of doses) Cycle 3 Day 2 of 3   Previous treatment: C2 11/20-11/22/24     Medication = cisplatin  Base Dose= 75 mg/m2  Calc Dose: Base Dose x 2 m2 = 150 mg  Final Dose = --- mg  Route = IV  Fluid & Volume =  mL  Admin Duration = Over 120 minutes   Day 1 only       <10% difference, ok to treat with final dose   Medication = etoposide  Base Dose= 100 mg/m2   Calc Dose: Base Dose x 2.02 m2 = 202 mg  Final Dose = 200 mg  Route = IV  Fluid & Volume =  mL  Admin Duration = Over 1 hour   Days 1-3       <10% difference, ok to treat with final dose   By my signature below, I confirm this process was performed independently with the BSA and all final chemotherapy dosing calculations congruent. I have reviewed " the above chemotherapy order and that my calculation of the final dose and BSA (when applicable) corroborate those calculations of the  pharmacist. Discrepancies of 10% or greater in the written dose have been addressed and documented within the EPIC Progress notes.    Zayra Padilla, PharmD

## 2024-12-12 NOTE — PROGRESS NOTES
Ms Barrett is here today for day 2 of etoposide.     She has no changes to report since yesterday.    Pt presented with port accessed, flushed and had good blood return.     Pre medication given.    Etoposide administered per MAR using taxol tubing and was tolerated well.     Port was flushed and HL.     Ms Barrett was discharged in stable condition.

## 2024-12-12 NOTE — PROGRESS NOTES
Chemotherapy Verification - PRIMARY RN      Height = 1.53 m  Weight = 95.8 kg  BSA = 2.02 m2       Medication: etoposide  Dose: 100mg/m2  Calculated Dose: 202 mg                             (In mg/m2, AUC, mg/kg)           I confirm this process was performed independently with the BSA and all final chemotherapy dosing calculations congruent.  Any discrepancies of 10% or greater have been addressed with the chemotherapy pharmacist. The resolution of the discrepancy has been documented in the EPIC progress notes.

## 2024-12-13 ENCOUNTER — OUTPATIENT INFUSION SERVICES (OUTPATIENT)
Dept: ONCOLOGY | Facility: MEDICAL CENTER | Age: 69
End: 2024-12-13
Payer: MEDICARE

## 2024-12-13 VITALS
TEMPERATURE: 97.4 F | SYSTOLIC BLOOD PRESSURE: 116 MMHG | OXYGEN SATURATION: 96 % | BODY MASS INDEX: 41.51 KG/M2 | RESPIRATION RATE: 18 BRPM | HEIGHT: 60 IN | HEART RATE: 105 BPM | WEIGHT: 211.42 LBS | DIASTOLIC BLOOD PRESSURE: 57 MMHG

## 2024-12-13 DIAGNOSIS — C53.8 MALIGNANT NEOPLASM OVERLAPPING CERVIX UTERI SITE (HCC): ICD-10-CM

## 2024-12-13 PROCEDURE — 304540 HCHG NITRO SET VENT 2ND TUB

## 2024-12-13 PROCEDURE — 700105 HCHG RX REV CODE 258

## 2024-12-13 PROCEDURE — 700111 HCHG RX REV CODE 636 W/ 250 OVERRIDE (IP): Mod: JZ

## 2024-12-13 PROCEDURE — 96413 CHEMO IV INFUSION 1 HR: CPT

## 2024-12-13 PROCEDURE — 96375 TX/PRO/DX INJ NEW DRUG ADDON: CPT

## 2024-12-13 PROCEDURE — 96377 APPLICATON ON-BODY INJECTOR: CPT

## 2024-12-13 RX ORDER — ONDANSETRON 2 MG/ML
8 INJECTION INTRAMUSCULAR; INTRAVENOUS ONCE
Status: COMPLETED | OUTPATIENT
Start: 2024-12-13 | End: 2024-12-13

## 2024-12-13 RX ADMIN — PEGFILGRASTIM 6 MG: KIT SUBCUTANEOUS at 09:34

## 2024-12-13 RX ADMIN — HEPARIN 500 UNITS: 100 SYRINGE at 10:42

## 2024-12-13 RX ADMIN — ONDANSETRON 8 MG: 2 INJECTION INTRAMUSCULAR; INTRAVENOUS at 09:13

## 2024-12-13 RX ADMIN — SODIUM CHLORIDE 200 MG: 9 INJECTION, SOLUTION INTRAVENOUS at 09:31

## 2024-12-13 ASSESSMENT — FIBROSIS 4 INDEX: FIB4 SCORE: 1.38

## 2024-12-13 NOTE — PROGRESS NOTES
"Pharmacy Chemotherapy Verification:    Dx: Cervical cancer overlapping uteri         Protocol: Cisplatin/etoposide     *Dosing Reference*  Cisplatin 75 mg/m2 IV over 2 hours on day 1  Etoposide 100 mg/m2 IV over 60 min daily on days 1-3  Q21-28 days x 4-6 cycles    NCCN Guidelines for Cervical cancer V.1.2024  Sean WK, et al - J Clin Oncol. 1985 Nov;3(11):1471-7.  Emmanuelle DR, et al - J Clin Oncol. 2011 Jun 1;29(16):2215-22.  Noah HB, et al - J Clin Oncol. 2005 Jun 1;23(16):1642-9.  Moshe PE, et al - Eur J Cancer Clin Oncol. 1987 Sep;23(9):1409-11.  Amelia G et al - Eur J Cancer Clin Oncol. 1987 Nov;23(11):1697-9.  Renetta S et al - Gynecol Oncol Rep. 2022 Aug 4:43:283393.    Allergies:  Morphine and Morphine     /57   Pulse (!) 105   Temp 36.3 °C (97.4 °F) (Temporal)   Resp 18   Ht 1.53 m (5' 0.24\")   Wt 95.9 kg (211 lb 6.7 oz)   SpO2 96%   BMI 40.97 kg/m²  Body surface area is 2.02 meters squared.    Labs 12/11/24:  ANC~ 2740 Plt = 272k   Hgb = 7.9*     SCr = 0.68 mg/dL CrCl ~ 112 mL/min   K = 4.1  Mag = 1.5 - replaced per protocol  AST/ALT/ALP = 18/11/67 Tbili = 0.3  *Ok to proceed with treatment today per MD    Drug Order   (Drug name, dose, route, IV Fluid & volume, frequency, number of doses) Cycle 3 Day 3 of 3   Previous treatment: C2 11/20-11/22/24     Medication = cisplatin  Base Dose= 75 mg/m2  Calc Dose: Base Dose x 2 m2 = 150 mg  Final Dose = --- mg  Route = IV  Fluid & Volume =  mL  Admin Duration = Over 120 minutes   Day 1 only       <10% difference, ok to treat with final dose   Medication = etoposide  Base Dose= 100 mg/m2   Calc Dose: Base Dose x 2.02 m2 = 202 mg  Final Dose = 200 mg  Route = IV  Fluid & Volume =  mL  Admin Duration = Over 1 hour   Days 1-3       <10% difference, ok to treat with final dose   By my signature below, I confirm this process was performed independently with the BSA and all final chemotherapy dosing calculations congruent. I have reviewed " the above chemotherapy order and that my calculation of the final dose and BSA (when applicable) corroborate those calculations of the  pharmacist. Discrepancies of 10% or greater in the written dose have been addressed and documented within the EPIC Progress notes.    Naeem MatosD

## 2024-12-13 NOTE — PROGRESS NOTES
Diane is here for Day 3 Etoposide. Port previously accessed; brisk blood return noted. Pre-medication given per MAR. Etoposide given per MAR over 1 hour. Neulasta Onpro given per MAR. Heparin instilled prior to de-accessing port. Port de-accessed; gauze/tape applied to site. Next appointment scheduled. Discharged to self care; no apparent distress noted.

## 2024-12-13 NOTE — PROGRESS NOTES
Chemotherapy Verification - PRIMARY RN      Height = 153 cm  Weight = 95.9 kg  BSA = 2.02 m^2       Medication: etoposide (Toposar)  Dose: 100 mg/m^2  Calculated Dose: 202 mg                             (In mg/m2, AUC, mg/kg)     I confirm this process was performed independently with the BSA and all final chemotherapy dosing calculations congruent.  Any discrepancies of 10% or greater have been addressed with the chemotherapy pharmacist. The resolution of the discrepancy has been documented in the EPIC progress notes.

## 2024-12-13 NOTE — PROGRESS NOTES
Chemotherapy Verification - SECONDARY RN       Height = 1.53m  Weight = 95.9kg  BSA = 2.02m2       Medication: etoposide  Dose: 100mg/m2  Calculated Dose: 202mg                             (In mg/m2, AUC, mg/kg)       I confirm that this process was performed independently.

## 2024-12-23 ENCOUNTER — PATIENT OUTREACH (OUTPATIENT)
Dept: ONCOLOGY | Facility: MEDICAL CENTER | Age: 69
End: 2024-12-23
Payer: COMMERCIAL

## 2024-12-23 NOTE — PROGRESS NOTES
On December 23, 2024, Oncology Social Worker aJne Song processed pt[facility]s [facility] [employer] [facility] application.  Pt. will be covered for a total of 4 nights.      12/11-12/13/2025-[facility] #[id number] -2 nights  1/1-1/3/2024-[facility] #[id number] nights

## 2025-01-07 NOTE — PROGRESS NOTES
"Pharmacy Chemotherapy Calculations    Dx: Cervical cancer    Cycle 4, Day 1-3  Previous treatment = C3  D1-3 Dec 11-13, 2024    Protocol: Cisplatin/Etoposide  Cisplatin 75 mg/m2 IV over 2 hours on Day 1  Etoposide 100 mg/m2 IV over 60 minutes daily on Days 1-3   21- to 28-day cycle for 4-6 cycles  NCCN Guidelines for Cervical Cancer V.1.2024.  Sean WK, et al. J Clin Oncol. 1985;3(11):1471-7.  Emmanuelle SUAREZ, et al. J Clin Oncol. 2011;29(16):2215-22.  Noah HB, et al. J Clin Oncol. 2005;23(16):3752- 9.  Postm PE, et al. Eur J Cancer Clin Oncol. 1987;23(9):1409-11.  Amelia G, et al. Eur J Cancer Clin Oncol. 1987;23(11):1697-9.  Maryjanehandas S , et al. Gynecol Oncol Rep. 2022;43:447930.    Allergies:  Morphine and Morphine       BP (!) 159/73   Pulse (!) 118   Temp 36.5 °C (97.7 °F) (Temporal)   Resp 18   Ht 1.53 m (5' 0.24\")   Wt 92 kg (202 lb 13.2 oz)   SpO2 98%   BMI 39.30 kg/m²  Body surface area is 1.98 meters squared.    All lab results 1/8/25 within treatment parameters. Except hgb < 8.   MD aware of all current lab results. Orders received to proceed with treatment. Will receive blood transfusion this week.     Cisplatin 75 mg/m² x 1.98 m² = 148.5 mg   <10% difference, OK to treat with final dose = 149 mg on Day 1 only    Etoposide 100 mg/m² x 1.98 m² = 198 mg   <10% difference, OK to treat with final dose = 200 mg on Days 1-3    Raymond Hilliard, PharmD  "

## 2025-01-07 NOTE — PROGRESS NOTES
"Pharmacy Chemotherapy Verification:    Dx: Cervical cancer overlapping uteri         Protocol: Cisplatin/etoposide     *Dosing Reference*  Cisplatin 75 mg/m2 IV over 2 hours on day 1  Etoposide 100 mg/m2 IV over 60 min daily on days 1-3  Q21-28 days x 4-6 cycles    NCCN Guidelines for Cervical cancer V.1.2024  Sean WK, et al - J Clin Oncol. 1985 Nov;3(11):1471-7.  Emmanuelle DR, et al - J Clin Oncol. 2011 Jun 1;29(16):2215-22.  Noah HB, et al - J Clin Oncol. 2005 Jun 1;23(16):7282-9.  Moshe PE, et al - Eur J Cancer Clin Oncol. 1987 Sep;23(9):1409-11.  Amelia G et al - Eur J Cancer Clin Oncol. 1987 Nov;23(11):1697-9.  Renetta S et al - Gynecol Oncol Rep. 2022 Aug 4:43:472575.    Allergies:  Morphine and Morphine     BP (!) 159/73   Pulse (!) 118   Temp 36.5 °C (97.7 °F) (Temporal)   Resp 18   Ht 1.53 m (5' 0.24\")   Wt 92 kg (202 lb 13.2 oz)   SpO2 98%   BMI 39.30 kg/m²  Body surface area is 1.98 meters squared.    Labs 1/8/25-:  ANC~ 2090 Plt = 290k   Hgb = 7.2*     SCr = 0.67 mg/dL CrCl ~ 109 mL/min   K = 4.3  Mag = 1.3 - replaced per protocol  AST/ALT/ALP = 20/11/51 Tbili = 0.3  *Ok to proceed with treatment today per PA, pt to receive 1 unit PRBCs this week    Drug Order   (Drug name, dose, route, IV Fluid & volume, frequency, number of doses) Cycle 4 Day 1 of 3 (delayed one week for unknown reason)  Previous treatment: C3 12/11-12/13/24     Medication = cisplatin  Base Dose= 75 mg/m2  Calc Dose: Base Dose x 1.98 m2 = 148.5 mg  Final Dose = 149 mg  Route = IV  Fluid & Volume =  mL  Admin Duration = Over 120 minutes   Day 1 only       <10% difference, ok to treat with final dose   Medication = etoposide  Base Dose= 100 mg/m2   Calc Dose: Base Dose x 1.98 m2 = 198 mg  Final Dose = 200 mg  Route = IV  Fluid & Volume =  mL  Admin Duration = Over 1 hour   Days 1-3       <10% difference, ok to treat with final dose   By my signature below, I confirm this process was performed independently with " the BSA and all final chemotherapy dosing calculations congruent. I have reviewed the above chemotherapy order and that my calculation of the final dose and BSA (when applicable) corroborate those calculations of the  pharmacist. Discrepancies of 10% or greater in the written dose have been addressed and documented within the EPIC Progress notes.    Twan Terry, PharmD

## 2025-01-08 ENCOUNTER — OUTPATIENT INFUSION SERVICES (OUTPATIENT)
Dept: ONCOLOGY | Facility: MEDICAL CENTER | Age: 70
End: 2025-01-08
Payer: MEDICARE

## 2025-01-08 VITALS
SYSTOLIC BLOOD PRESSURE: 159 MMHG | DIASTOLIC BLOOD PRESSURE: 73 MMHG | HEIGHT: 60 IN | RESPIRATION RATE: 18 BRPM | OXYGEN SATURATION: 98 % | BODY MASS INDEX: 39.82 KG/M2 | TEMPERATURE: 97.7 F | HEART RATE: 118 BPM | WEIGHT: 202.82 LBS

## 2025-01-08 DIAGNOSIS — C53.8 MALIGNANT NEOPLASM OVERLAPPING CERVIX UTERI SITE (HCC): ICD-10-CM

## 2025-01-08 LAB
ALBUMIN SERPL BCP-MCNC: 3.7 G/DL (ref 3.2–4.9)
ALBUMIN/GLOB SERPL: 1.4 G/DL
ALP SERPL-CCNC: 51 U/L (ref 30–99)
ALT SERPL-CCNC: 11 U/L (ref 2–50)
ANION GAP SERPL CALC-SCNC: 8 MMOL/L (ref 7–16)
AST SERPL-CCNC: 20 U/L (ref 12–45)
BASOPHILS # BLD AUTO: 1 % (ref 0–1.8)
BASOPHILS # BLD: 0.05 K/UL (ref 0–0.12)
BILIRUB SERPL-MCNC: 0.3 MG/DL (ref 0.1–1.5)
BUN SERPL-MCNC: 15 MG/DL (ref 8–22)
CALCIUM ALBUM COR SERPL-MCNC: 10.3 MG/DL (ref 8.5–10.5)
CALCIUM SERPL-MCNC: 10.1 MG/DL (ref 8.5–10.5)
CHLORIDE SERPL-SCNC: 105 MMOL/L (ref 96–112)
CO2 SERPL-SCNC: 27 MMOL/L (ref 20–33)
CREAT SERPL-MCNC: 0.67 MG/DL (ref 0.5–1.4)
EOSINOPHIL # BLD AUTO: 0.06 K/UL (ref 0–0.51)
EOSINOPHIL NFR BLD: 1.2 % (ref 0–6.9)
ERYTHROCYTE [DISTWIDTH] IN BLOOD BY AUTOMATED COUNT: 81.4 FL (ref 35.9–50)
GFR SERPLBLD CREATININE-BSD FMLA CKD-EPI: 94 ML/MIN/1.73 M 2
GLOBULIN SER CALC-MCNC: 2.7 G/DL (ref 1.9–3.5)
GLUCOSE SERPL-MCNC: 142 MG/DL (ref 65–99)
HCT VFR BLD AUTO: 23.8 % (ref 37–47)
HGB BLD-MCNC: 7.2 G/DL (ref 12–16)
IMM GRANULOCYTES # BLD AUTO: 0.04 K/UL (ref 0–0.11)
IMM GRANULOCYTES NFR BLD AUTO: 0.8 % (ref 0–0.9)
LYMPHOCYTES # BLD AUTO: 2 K/UL (ref 1–4.8)
LYMPHOCYTES NFR BLD: 41.1 % (ref 22–41)
MAGNESIUM SERPL-MCNC: 1.3 MG/DL (ref 1.5–2.5)
MCH RBC QN AUTO: 24.4 PG (ref 27–33)
MCHC RBC AUTO-ENTMCNC: 30.3 G/DL (ref 32.2–35.5)
MCV RBC AUTO: 80.7 FL (ref 81.4–97.8)
MONOCYTES # BLD AUTO: 0.63 K/UL (ref 0–0.85)
MONOCYTES NFR BLD AUTO: 12.9 % (ref 0–13.4)
NEUTROPHILS # BLD AUTO: 2.09 K/UL (ref 1.82–7.42)
NEUTROPHILS NFR BLD: 43 % (ref 44–72)
NRBC # BLD AUTO: 0.03 K/UL
NRBC BLD-RTO: 0.6 /100 WBC (ref 0–0.2)
OUTPT INFUS CBC COMMENT OICOM: ABNORMAL
PLATELET # BLD AUTO: 290 K/UL (ref 164–446)
PMV BLD AUTO: 10.8 FL (ref 9–12.9)
POTASSIUM SERPL-SCNC: 4.3 MMOL/L (ref 3.6–5.5)
PROT SERPL-MCNC: 6.4 G/DL (ref 6–8.2)
RBC # BLD AUTO: 2.95 M/UL (ref 4.2–5.4)
SODIUM SERPL-SCNC: 140 MMOL/L (ref 135–145)
WBC # BLD AUTO: 4.9 K/UL (ref 4.8–10.8)

## 2025-01-08 PROCEDURE — 700105 HCHG RX REV CODE 258: Performed by: PHYSICIAN ASSISTANT

## 2025-01-08 PROCEDURE — 80053 COMPREHEN METABOLIC PANEL: CPT

## 2025-01-08 PROCEDURE — 96415 CHEMO IV INFUSION ADDL HR: CPT

## 2025-01-08 PROCEDURE — A4212 NON CORING NEEDLE OR STYLET: HCPCS

## 2025-01-08 PROCEDURE — 83735 ASSAY OF MAGNESIUM: CPT

## 2025-01-08 PROCEDURE — 96413 CHEMO IV INFUSION 1 HR: CPT

## 2025-01-08 PROCEDURE — 700111 HCHG RX REV CODE 636 W/ 250 OVERRIDE (IP): Performed by: PHYSICIAN ASSISTANT

## 2025-01-08 PROCEDURE — 96367 TX/PROPH/DG ADDL SEQ IV INF: CPT

## 2025-01-08 PROCEDURE — 96366 THER/PROPH/DIAG IV INF ADDON: CPT

## 2025-01-08 PROCEDURE — 96368 THER/DIAG CONCURRENT INF: CPT

## 2025-01-08 PROCEDURE — 96417 CHEMO IV INFUS EACH ADDL SEQ: CPT

## 2025-01-08 PROCEDURE — 96375 TX/PRO/DX INJ NEW DRUG ADDON: CPT

## 2025-01-08 PROCEDURE — 700111 HCHG RX REV CODE 636 W/ 250 OVERRIDE (IP)

## 2025-01-08 PROCEDURE — 85025 COMPLETE CBC W/AUTO DIFF WBC: CPT

## 2025-01-08 PROCEDURE — 96361 HYDRATE IV INFUSION ADD-ON: CPT

## 2025-01-08 PROCEDURE — 304540 HCHG NITRO SET VENT 2ND TUB

## 2025-01-08 RX ORDER — SODIUM CHLORIDE 9 MG/ML
1000 INJECTION, SOLUTION INTRAVENOUS ONCE
Status: COMPLETED | OUTPATIENT
Start: 2025-01-08 | End: 2025-01-08

## 2025-01-08 RX ORDER — MAGNESIUM SULFATE HEPTAHYDRATE 40 MG/ML
2 INJECTION, SOLUTION INTRAVENOUS ONCE
Status: COMPLETED | OUTPATIENT
Start: 2025-01-08 | End: 2025-01-08

## 2025-01-08 RX ORDER — ONDANSETRON 2 MG/ML
16 INJECTION INTRAMUSCULAR; INTRAVENOUS ONCE
Status: COMPLETED | OUTPATIENT
Start: 2025-01-08 | End: 2025-01-08

## 2025-01-08 RX ORDER — DIPHENHYDRAMINE HCL 25 MG
25 TABLET ORAL ONCE
Status: CANCELLED | OUTPATIENT
Start: 2025-01-08 | End: 2025-01-08

## 2025-01-08 RX ORDER — HYDROCORTISONE SODIUM SUCCINATE 100 MG/2ML
100 INJECTION INTRAMUSCULAR; INTRAVENOUS ONCE
Status: CANCELLED | OUTPATIENT
Start: 2025-01-08 | End: 2025-01-08

## 2025-01-08 RX ORDER — SODIUM CHLORIDE 9 MG/ML
INJECTION, SOLUTION INTRAVENOUS CONTINUOUS
Status: CANCELLED | OUTPATIENT
Start: 2025-01-08

## 2025-01-08 RX ORDER — DIPHENHYDRAMINE HYDROCHLORIDE 50 MG/ML
25 INJECTION INTRAMUSCULAR; INTRAVENOUS PRN
Status: CANCELLED | OUTPATIENT
Start: 2025-01-08

## 2025-01-08 RX ORDER — ACETAMINOPHEN 325 MG/1
650 TABLET ORAL ONCE
Status: CANCELLED | OUTPATIENT
Start: 2025-01-08

## 2025-01-08 RX ORDER — HYDROCORTISONE SODIUM SUCCINATE 100 MG/2ML
100 INJECTION INTRAMUSCULAR; INTRAVENOUS PRN
Status: CANCELLED | OUTPATIENT
Start: 2025-01-08

## 2025-01-08 RX ORDER — ACETAMINOPHEN 325 MG/1
650 TABLET ORAL PRN
Status: CANCELLED | OUTPATIENT
Start: 2025-01-08

## 2025-01-08 RX ORDER — DEXAMETHASONE SODIUM PHOSPHATE 4 MG/ML
12 INJECTION, SOLUTION INTRA-ARTICULAR; INTRALESIONAL; INTRAMUSCULAR; INTRAVENOUS; SOFT TISSUE ONCE
Status: COMPLETED | OUTPATIENT
Start: 2025-01-08 | End: 2025-01-08

## 2025-01-08 RX ADMIN — FOSAPREPITANT 150 MG: 150 INJECTION, POWDER, LYOPHILIZED, FOR SOLUTION INTRAVENOUS at 10:04

## 2025-01-08 RX ADMIN — ONDANSETRON 16 MG: 2 INJECTION INTRAMUSCULAR; INTRAVENOUS at 09:51

## 2025-01-08 RX ADMIN — HEPARIN 500 UNITS: 100 SYRINGE at 16:23

## 2025-01-08 RX ADMIN — MAGNESIUM SULFATE HEPTAHYDRATE 2 G: 2 INJECTION, SOLUTION INTRAVENOUS at 10:54

## 2025-01-08 RX ADMIN — SODIUM CHLORIDE 1000 ML: 9 INJECTION, SOLUTION INTRAVENOUS at 08:30

## 2025-01-08 RX ADMIN — ETOPOSIDE 200 MG: 20 INJECTION INTRAVENOUS at 13:26

## 2025-01-08 RX ADMIN — CISPLATIN 149 MG: 1 INJECTION, SOLUTION INTRAVENOUS at 11:01

## 2025-01-08 RX ADMIN — POTASSIUM CHLORIDE: 2 INJECTION, SOLUTION, CONCENTRATE INTRAVENOUS at 14:35

## 2025-01-08 RX ADMIN — DEXAMETHASONE SODIUM PHOSPHATE 12 MG: 4 INJECTION INTRA-ARTICULAR; INTRALESIONAL; INTRAMUSCULAR; INTRAVENOUS; SOFT TISSUE at 09:51

## 2025-01-08 ASSESSMENT — FIBROSIS 4 INDEX: FIB4 SCORE: 1.38

## 2025-01-08 NOTE — PROGRESS NOTES
Diane into [facility] for cycle 4 day 1 of cisplatin + etoposide for lung cancer.  Right chest port accessed, + blood return and flushing without difficulty. Labs drawn as ordered.  Labs reviewed, hemoglobin 7.2 and magnesium 1.3. Dr. Silverman aware; received orders to transfuse one unit PRBCs and electrolyte replacement per protocol. Pt declines blood products at this time. Dr. Silverman notified that pt will not accept blood products today. TORB Dr. Silverman/ RN ok to treat today with hgb of 7.2.  Diane pre-medicated as ordered. Cisplatin run over 2 hours, magnesium replaced, etoposide run over 1 hour, and post hydration with electrolytes.  Diane tolerated treatment without any adverse effects noted.  Port flushed post infusions, + blood return, heparin locked but remains accessed for treatment tomorrow.

## 2025-01-08 NOTE — PROGRESS NOTES
Chemotherapy Verification - SECONDARY RN       Height = 153cm  Weight = 92kg  BSA = 1.98       Medication: Cisplatin  Dose: 75mg/m2  Calculated Dose: 148.5mg                             (In mg/m2, AUC, mg/kg)     Medication: Etoposide  Dose: 100mg/m2  Calculated Dose: 198mg                             (In mg/m2, AUC, mg/kg)      I confirm that this process was performed independently.

## 2025-01-08 NOTE — PROGRESS NOTES
Chemotherapy Verification - PRIMARY RN      Height = 1.53 m  Weight = 92 kg  BSA = 1.98 m2       Medication: cisplatin  Dose: 75 mg/m2  Calculated Dose: 148.5 mg                             (In mg/m2, AUC, mg/kg)     Medication: etoposide  Dose: 100 mg/m2  Calculated Dose: 198 mg.                              (In mg/m2, AUC, mg/kg)        I confirm this process was performed independently with the BSA and all final chemotherapy dosing calculations congruent.  Any discrepancies of 10% or greater have been addressed with the chemotherapy pharmacist. The resolution of the discrepancy has been documented in the EPIC progress notes.

## 2025-01-09 ENCOUNTER — OUTPATIENT INFUSION SERVICES (OUTPATIENT)
Dept: ONCOLOGY | Facility: MEDICAL CENTER | Age: 70
End: 2025-01-09
Payer: MEDICARE

## 2025-01-09 VITALS
DIASTOLIC BLOOD PRESSURE: 55 MMHG | SYSTOLIC BLOOD PRESSURE: 136 MMHG | HEART RATE: 96 BPM | OXYGEN SATURATION: 98 % | HEIGHT: 60 IN | TEMPERATURE: 97 F | WEIGHT: 207.23 LBS | RESPIRATION RATE: 18 BRPM | BODY MASS INDEX: 40.69 KG/M2

## 2025-01-09 DIAGNOSIS — C53.8 MALIGNANT NEOPLASM OVERLAPPING CERVIX UTERI SITE (HCC): ICD-10-CM

## 2025-01-09 PROCEDURE — 96375 TX/PRO/DX INJ NEW DRUG ADDON: CPT

## 2025-01-09 PROCEDURE — 700111 HCHG RX REV CODE 636 W/ 250 OVERRIDE (IP): Performed by: PHYSICIAN ASSISTANT

## 2025-01-09 PROCEDURE — 700105 HCHG RX REV CODE 258: Performed by: PHYSICIAN ASSISTANT

## 2025-01-09 PROCEDURE — 96413 CHEMO IV INFUSION 1 HR: CPT

## 2025-01-09 RX ORDER — ONDANSETRON 2 MG/ML
8 INJECTION INTRAMUSCULAR; INTRAVENOUS ONCE
Status: COMPLETED | OUTPATIENT
Start: 2025-01-09 | End: 2025-01-09

## 2025-01-09 RX ADMIN — ETOPOSIDE 200 MG: 20 INJECTION INTRAVENOUS at 17:06

## 2025-01-09 RX ADMIN — ONDANSETRON 8 MG: 2 INJECTION INTRAMUSCULAR; INTRAVENOUS at 16:39

## 2025-01-09 RX ADMIN — HEPARIN 500 UNITS: 100 SYRINGE at 18:14

## 2025-01-09 ASSESSMENT — FIBROSIS 4 INDEX: FIB4 SCORE: 1.43

## 2025-01-09 NOTE — PROGRESS NOTES
"2Pharmacy Chemotherapy Verification:    Dx: Cervical cancer overlapping uteri         Protocol: Cisplatin/etoposide     *Dosing Reference*  Cisplatin 75 mg/m2 IV over 2 hours on day 1  Etoposide 100 mg/m2 IV over 60 min daily on days 1-3  Q21-28 days x 4-6 cycles    NCCN Guidelines for Cervical cancer V.1.2024  Sean WK, et al - J Clin Oncol. 1985 Nov;3(11):1471-7.  Emmanuelle DR, et al - J Clin Oncol. 2011 Jun 1;29(16):2215-22.  Noah HB, et al - J Clin Oncol. 2005 Jun 1;23(16):5352-9.  Moshe PE, et al - Eur J Cancer Clin Oncol. 1987 Sep;23(9):1409-11.  Amelia Gbeulah - Eur J Cancer Clin Oncol. 1987 Nov;23(11):1697-9.  Renetta S, et al - Gynecol Oncol Rep. 2022 Aug 4:43:627713.    Allergies:  Morphine and Morphine     /55   Pulse 96   Temp 36.1 °C (97 °F) (Temporal)   Resp 18   Ht 1.53 m (5' 0.24\")   Wt 94 kg (207 lb 3.7 oz)   SpO2 98%   BMI 40.16 kg/m²  Body surface area is 2 meters squared.    Labs 1/8/25:  ANC~ 2090 Plt = 290k   Hgb = 7.2*     SCr = 0.67 mg/dL CrCl ~ 109 mL/min   K = 4.3  Mag = 1.3 - replaced per protocol  AST/ALT/ALP = 20/11/51 Tbili = 0.3  *Ok to proceed with treatment today per PA, pt to receive 1 unit PRBCs this week    Drug Order   (Drug name, dose, route, IV Fluid & volume, frequency, number of doses) Cycle 4 Day 2 of 3 (delayed one week for unknown reason)  Previous treatment: C3 12/11-12/13/24     Medication = cisplatin  Base Dose= 75 mg/m2  Calc Dose: Base Dose x 1.98 m2 = 148.5 mg  Final Dose = --- mg  Route = IV  Fluid & Volume =  mL  Admin Duration = Over 120 minutes   Day 1 only       <10% difference, ok to treat with final dose   Medication = etoposide  Base Dose= 100 mg/m2   Calc Dose: Base Dose x 2 m2 = 200 mg  Final Dose = 200 mg  Route = IV  Fluid & Volume =  mL  Admin Duration = Over 1 hour   Days 1-3       <10% difference, ok to treat with final dose     By my signature below, I confirm this process was performed independently with the BSA and all " final chemotherapy dosing calculations congruent. I have reviewed the above chemotherapy order and that my calculation of the final dose and BSA (when applicable) corroborate those calculations of the  pharmacist. Discrepancies of 10% or greater in the written dose have been addressed and documented within the EPIC Progress notes.    Natalia Oswald, NaeemD

## 2025-01-10 ENCOUNTER — OUTPATIENT INFUSION SERVICES (OUTPATIENT)
Dept: ONCOLOGY | Facility: MEDICAL CENTER | Age: 70
End: 2025-01-10
Payer: MEDICARE

## 2025-01-10 VITALS
DIASTOLIC BLOOD PRESSURE: 72 MMHG | HEART RATE: 96 BPM | RESPIRATION RATE: 16 BRPM | TEMPERATURE: 97 F | BODY MASS INDEX: 39.46 KG/M2 | SYSTOLIC BLOOD PRESSURE: 127 MMHG | OXYGEN SATURATION: 96 % | WEIGHT: 209 LBS | HEIGHT: 61 IN

## 2025-01-10 DIAGNOSIS — C53.8 MALIGNANT NEOPLASM OVERLAPPING CERVIX UTERI SITE (HCC): ICD-10-CM

## 2025-01-10 PROCEDURE — 304540 HCHG NITRO SET VENT 2ND TUB

## 2025-01-10 PROCEDURE — 96377 APPLICATON ON-BODY INJECTOR: CPT

## 2025-01-10 PROCEDURE — 96375 TX/PRO/DX INJ NEW DRUG ADDON: CPT

## 2025-01-10 PROCEDURE — 96409 CHEMO IV PUSH SNGL DRUG: CPT

## 2025-01-10 PROCEDURE — 700105 HCHG RX REV CODE 258: Performed by: PHYSICIAN ASSISTANT

## 2025-01-10 PROCEDURE — 96372 THER/PROPH/DIAG INJ SC/IM: CPT

## 2025-01-10 PROCEDURE — 700111 HCHG RX REV CODE 636 W/ 250 OVERRIDE (IP): Performed by: PHYSICIAN ASSISTANT

## 2025-01-10 RX ORDER — ONDANSETRON 2 MG/ML
8 INJECTION INTRAMUSCULAR; INTRAVENOUS ONCE
Status: COMPLETED | OUTPATIENT
Start: 2025-01-10 | End: 2025-01-10

## 2025-01-10 RX ADMIN — PEGFILGRASTIM 6 MG: KIT SUBCUTANEOUS at 13:19

## 2025-01-10 RX ADMIN — HEPARIN 500 UNITS: 100 SYRINGE at 13:19

## 2025-01-10 RX ADMIN — ONDANSETRON 8 MG: 2 INJECTION INTRAMUSCULAR; INTRAVENOUS at 11:49

## 2025-01-10 RX ADMIN — ETOPOSIDE 200 MG: 20 INJECTION INTRAVENOUS at 12:13

## 2025-01-10 ASSESSMENT — FIBROSIS 4 INDEX: FIB4 SCORE: 1.43

## 2025-01-10 NOTE — PROGRESS NOTES
Chemotherapy Verification - SECONDARY RN       Height = 153cm  Weight = 94kg  BSA = 2.0       Medication: Etoposide  Dose: 100mg/m2  Calculated Dose: 200mg                              (In mg/m2, AUC, mg/kg)       I confirm that this process was performed independently.

## 2025-01-10 NOTE — PROGRESS NOTES
Chemotherapy Verification - PRIMARY RN      Height = 1.53 m  Weight = 94 kg  BSA = 2 m2       Medication: etoposide  Dose: 100mg/m2 Calculated Dose: 200 mg                            (In mg/m2, AUC, mg/kg)     I confirm this process was performed independently with the BSA and all final chemotherapy dosing calculations congruent.  Any discrepancies of 10% or greater have been addressed with the chemotherapy pharmacist. The resolution of the discrepancy has been documented in the EPIC progress notes.

## 2025-01-10 NOTE — PROGRESS NOTES
Ms Barrett is here today for day 3 cycle 4 of Etoposide, she reports tolerating treatment well, no new concerns reported today.    Pt presented today with medi port already accessed, flushed easily with good blood return.     Zofran given and was tolerated well.   Etoposide was administered per MAR and was tolerated well.     Neulasta onpro was activated and placed to the back of her right arm.     Medi port was flushed and HL, mulligan was removed and Ms Barrett was discharged in stable condition.

## 2025-01-10 NOTE — PROGRESS NOTES
"2Pharmacy Chemotherapy Verification:    Dx: Cervical cancer overlapping uteri         Protocol: Cisplatin/etoposide     *Dosing Reference*  Cisplatin 75 mg/m2 IV over 2 hours on day 1  Etoposide 100 mg/m2 IV over 60 min daily on days 1-3  Q21-28 days x 4-6 cycles    NCCN Guidelines for Cervical cancer V.1.2024  Sean WK, et al - J Clin Oncol. 1985 Nov;3(11):1471-7.  Emmanuelle DR, et al - J Clin Oncol. 2011 Jun 1;29(16):2215-22.  Noah HB, et al - J Clin Oncol. 2005 Jun 1;23(16):5882-9.  Moshe PE, et al - Eur J Cancer Clin Oncol. 1987 Sep;23(9):1409-11.  Amelia Gbeulah - Eur J Cancer Clin Oncol. 1987 Nov;23(11):1697-9.  Renetta S, et al - Gynecol Oncol Rep. 2022 Aug 4:43:793879.    Allergies:  Morphine and Morphine     /72   Pulse 96   Temp 36.1 °C (97 °F) (Temporal)   Resp 16   Ht 1.54 m (5' 0.63\")   Wt 94.8 kg (208 lb 15.9 oz)   SpO2 96%   BMI 39.97 kg/m²  Body surface area is 2.01 meters squared.    Labs 1/8/25:  ANC~ 2090 Plt = 290k   Hgb = 7.2*     SCr = 0.67 mg/dL CrCl ~ 109 mL/min   K = 4.3  Mag = 1.3 - replaced per protocol  AST/ALT/ALP = 20/11/51 Tbili = 0.3  *Ok to proceed with treatment today per PA, pt to receive 1 unit PRBCs this week    Drug Order   (Drug name, dose, route, IV Fluid & volume, frequency, number of doses) Cycle 4 Day 3 of 3 (delayed one week for unknown reason)  Previous treatment: C3 12/11-12/13/24     Medication = cisplatin  Base Dose= 75 mg/m2  Calc Dose: Base Dose x 1.98 m2 = 148.5 mg  Final Dose = --- mg  Route = IV  Fluid & Volume =  mL  Admin Duration = Over 120 minutes   Day 1 only       <10% difference, ok to treat with final dose   Medication = etoposide  Base Dose= 100 mg/m2   Calc Dose: Base Dose x 2 m2 = 200 mg  Final Dose = 200 mg  Route = IV  Fluid & Volume =  mL  Admin Duration = Over 1 hour   Days 1-3       <10% difference, ok to treat with final dose     By my signature below, I confirm this process was performed independently with the BSA " and all final chemotherapy dosing calculations congruent. I have reviewed the above chemotherapy order and that my calculation of the final dose and BSA (when applicable) corroborate those calculations of the  pharmacist. Discrepancies of 10% or greater in the written dose have been addressed and documented within the EPIC Progress notes.    Christine Mojica, PharmD

## 2025-01-10 NOTE — PROGRESS NOTES
Chemotherapy Verification - SECONDARY RN       Height = 1.54m  Weight = 94.8kg  BSA = 2.01m2       Medication: etoposide (Toposar)  Dose: 100mg/m2  Calculated Dose: 201mg                             (In mg/m2, AUC, mg/kg)         I confirm that this process was performed independently.

## 2025-01-10 NOTE — PROGRESS NOTES
Diane presents to infusion for cycle 4/ day 2 of etoposide for cervical cancer. She denies having any new or acute complaints today, reports tolerating past treatments well. Port flushed with NS with positive blood return.     Labs reviewed by RN, within parameters for treatment today.     Pre-treatment meds given as ordered.     etoposide given over 1 hour.  Patient tolerated well with no adverse effects.     Port flushed post infusion with positive blood return and left in place for her appointment tomorrow. Patient left in stable condition, knows when to return for next appointment.

## 2025-01-10 NOTE — PROGRESS NOTES
Chemotherapy Verification - SECONDARY RN     C4 D3    Height = 154 cm  Weight = 94.8 kg  BSA = 2.01 m^2       Medication: Etoposide  Dose: 100 mg/m^2  Calculated Dose: 201 mg                             (In mg/m2, AUC, mg/kg)     I confirm that this process was performed independently.

## 2025-01-16 RX ORDER — ONDANSETRON 8 MG/1
8 TABLET, ORALLY DISINTEGRATING ORAL PRN
Status: CANCELLED | OUTPATIENT
Start: 2025-01-29

## 2025-01-16 RX ORDER — 0.9 % SODIUM CHLORIDE 0.9 %
10 VIAL (ML) INJECTION PRN
Status: CANCELLED | OUTPATIENT
Start: 2025-01-30

## 2025-01-16 RX ORDER — 0.9 % SODIUM CHLORIDE 0.9 %
10 VIAL (ML) INJECTION PRN
Status: CANCELLED | OUTPATIENT
Start: 2025-01-29

## 2025-01-16 RX ORDER — 0.9 % SODIUM CHLORIDE 0.9 %
3 VIAL (ML) INJECTION PRN
Status: CANCELLED | OUTPATIENT
Start: 2025-01-30

## 2025-01-16 RX ORDER — 0.9 % SODIUM CHLORIDE 0.9 %
VIAL (ML) INJECTION PRN
Status: CANCELLED | OUTPATIENT
Start: 2025-01-30

## 2025-01-16 RX ORDER — ONDANSETRON 2 MG/ML
8 INJECTION INTRAMUSCULAR; INTRAVENOUS ONCE
Status: CANCELLED | OUTPATIENT
Start: 2025-01-31 | End: 2025-02-06

## 2025-01-16 RX ORDER — 0.9 % SODIUM CHLORIDE 0.9 %
10 VIAL (ML) INJECTION PRN
Status: CANCELLED | OUTPATIENT
Start: 2025-01-31

## 2025-01-16 RX ORDER — 0.9 % SODIUM CHLORIDE 0.9 %
VIAL (ML) INJECTION PRN
Status: CANCELLED | OUTPATIENT
Start: 2025-01-31

## 2025-01-16 RX ORDER — ONDANSETRON 2 MG/ML
16 INJECTION INTRAMUSCULAR; INTRAVENOUS ONCE
Status: CANCELLED | OUTPATIENT
Start: 2025-01-29 | End: 2025-01-29

## 2025-01-16 RX ORDER — METHYLPREDNISOLONE SODIUM SUCCINATE 125 MG/2ML
125 INJECTION, POWDER, LYOPHILIZED, FOR SOLUTION INTRAMUSCULAR; INTRAVENOUS PRN
Status: CANCELLED | OUTPATIENT
Start: 2025-01-31

## 2025-01-16 RX ORDER — ONDANSETRON 2 MG/ML
4 INJECTION INTRAMUSCULAR; INTRAVENOUS PRN
Status: CANCELLED | OUTPATIENT
Start: 2025-01-30

## 2025-01-16 RX ORDER — SODIUM CHLORIDE 9 MG/ML
1000 INJECTION, SOLUTION INTRAVENOUS ONCE
Status: CANCELLED | OUTPATIENT
Start: 2025-01-29

## 2025-01-16 RX ORDER — METHYLPREDNISOLONE SODIUM SUCCINATE 125 MG/2ML
125 INJECTION, POWDER, LYOPHILIZED, FOR SOLUTION INTRAMUSCULAR; INTRAVENOUS PRN
Status: CANCELLED | OUTPATIENT
Start: 2025-01-29

## 2025-01-16 RX ORDER — METHYLPREDNISOLONE SODIUM SUCCINATE 125 MG/2ML
125 INJECTION, POWDER, LYOPHILIZED, FOR SOLUTION INTRAMUSCULAR; INTRAVENOUS PRN
Status: CANCELLED | OUTPATIENT
Start: 2025-01-30

## 2025-01-16 RX ORDER — SODIUM CHLORIDE 9 MG/ML
INJECTION, SOLUTION INTRAVENOUS CONTINUOUS
Status: CANCELLED | OUTPATIENT
Start: 2025-01-29

## 2025-01-16 RX ORDER — DIPHENHYDRAMINE HYDROCHLORIDE 50 MG/ML
50 INJECTION INTRAMUSCULAR; INTRAVENOUS PRN
Status: CANCELLED | OUTPATIENT
Start: 2025-01-30

## 2025-01-16 RX ORDER — 0.9 % SODIUM CHLORIDE 0.9 %
10 VIAL (ML) INJECTION PRN
Status: CANCELLED | OUTPATIENT
Start: 2025-01-28

## 2025-01-16 RX ORDER — ONDANSETRON 8 MG/1
8 TABLET, ORALLY DISINTEGRATING ORAL PRN
Status: CANCELLED | OUTPATIENT
Start: 2025-01-30

## 2025-01-16 RX ORDER — ONDANSETRON 8 MG/1
8 TABLET, ORALLY DISINTEGRATING ORAL PRN
Status: CANCELLED | OUTPATIENT
Start: 2025-01-31

## 2025-01-16 RX ORDER — DIPHENHYDRAMINE HYDROCHLORIDE 50 MG/ML
50 INJECTION INTRAMUSCULAR; INTRAVENOUS PRN
Status: CANCELLED | OUTPATIENT
Start: 2025-01-29

## 2025-01-16 RX ORDER — 0.9 % SODIUM CHLORIDE 0.9 %
3 VIAL (ML) INJECTION PRN
Status: CANCELLED | OUTPATIENT
Start: 2025-01-28

## 2025-01-16 RX ORDER — DEXAMETHASONE SODIUM PHOSPHATE 4 MG/ML
12 INJECTION, SOLUTION INTRA-ARTICULAR; INTRALESIONAL; INTRAMUSCULAR; INTRAVENOUS; SOFT TISSUE ONCE
Status: CANCELLED | OUTPATIENT
Start: 2025-01-29 | End: 2025-01-29

## 2025-01-16 RX ORDER — ONDANSETRON 2 MG/ML
8 INJECTION INTRAMUSCULAR; INTRAVENOUS ONCE
Status: CANCELLED | OUTPATIENT
Start: 2025-01-30 | End: 2025-02-05

## 2025-01-16 RX ORDER — 0.9 % SODIUM CHLORIDE 0.9 %
VIAL (ML) INJECTION PRN
Status: CANCELLED | OUTPATIENT
Start: 2025-01-29

## 2025-01-16 RX ORDER — SODIUM CHLORIDE 9 MG/ML
INJECTION, SOLUTION INTRAVENOUS CONTINUOUS
Status: CANCELLED | OUTPATIENT
Start: 2025-01-31

## 2025-01-16 RX ORDER — ONDANSETRON 2 MG/ML
4 INJECTION INTRAMUSCULAR; INTRAVENOUS PRN
Status: CANCELLED | OUTPATIENT
Start: 2025-01-29

## 2025-01-16 RX ORDER — 0.9 % SODIUM CHLORIDE 0.9 %
VIAL (ML) INJECTION PRN
Status: CANCELLED | OUTPATIENT
Start: 2025-01-28

## 2025-01-16 RX ORDER — 0.9 % SODIUM CHLORIDE 0.9 %
3 VIAL (ML) INJECTION PRN
Status: CANCELLED | OUTPATIENT
Start: 2025-01-31

## 2025-01-16 RX ORDER — PROCHLORPERAZINE MALEATE 10 MG
10 TABLET ORAL EVERY 6 HOURS PRN
Status: CANCELLED | OUTPATIENT
Start: 2025-01-29

## 2025-01-16 RX ORDER — PROCHLORPERAZINE MALEATE 10 MG
10 TABLET ORAL EVERY 6 HOURS PRN
Status: CANCELLED | OUTPATIENT
Start: 2025-01-31

## 2025-01-16 RX ORDER — EPINEPHRINE 1 MG/ML(1)
0.5 AMPUL (ML) INJECTION PRN
Status: CANCELLED | OUTPATIENT
Start: 2025-01-31

## 2025-01-16 RX ORDER — 0.9 % SODIUM CHLORIDE 0.9 %
3 VIAL (ML) INJECTION PRN
Status: CANCELLED | OUTPATIENT
Start: 2025-01-29

## 2025-01-16 RX ORDER — ONDANSETRON 2 MG/ML
4 INJECTION INTRAMUSCULAR; INTRAVENOUS PRN
Status: CANCELLED | OUTPATIENT
Start: 2025-01-31

## 2025-01-16 RX ORDER — EPINEPHRINE 1 MG/ML(1)
0.5 AMPUL (ML) INJECTION PRN
Status: CANCELLED | OUTPATIENT
Start: 2025-01-29

## 2025-01-16 RX ORDER — PROCHLORPERAZINE MALEATE 10 MG
10 TABLET ORAL EVERY 6 HOURS PRN
Status: CANCELLED | OUTPATIENT
Start: 2025-01-30

## 2025-01-16 RX ORDER — EPINEPHRINE 1 MG/ML(1)
0.5 AMPUL (ML) INJECTION PRN
Status: CANCELLED | OUTPATIENT
Start: 2025-01-30

## 2025-01-16 RX ORDER — DIPHENHYDRAMINE HYDROCHLORIDE 50 MG/ML
50 INJECTION INTRAMUSCULAR; INTRAVENOUS PRN
Status: CANCELLED | OUTPATIENT
Start: 2025-01-31

## 2025-01-16 RX ORDER — SODIUM CHLORIDE 9 MG/ML
INJECTION, SOLUTION INTRAVENOUS CONTINUOUS
Status: CANCELLED | OUTPATIENT
Start: 2025-01-30

## 2025-01-27 ENCOUNTER — HOSPITAL ENCOUNTER (OUTPATIENT)
Facility: MEDICAL CENTER | Age: 70
End: 2025-01-27
Attending: SPECIALIST
Payer: MEDICARE

## 2025-01-27 PROCEDURE — 80053 COMPREHEN METABOLIC PANEL: CPT

## 2025-01-27 PROCEDURE — 83735 ASSAY OF MAGNESIUM: CPT

## 2025-01-27 PROCEDURE — 85055 RETICULATED PLATELET ASSAY: CPT

## 2025-01-27 PROCEDURE — 86901 BLOOD TYPING SEROLOGIC RH(D): CPT

## 2025-01-27 PROCEDURE — 85027 COMPLETE CBC AUTOMATED: CPT

## 2025-01-27 PROCEDURE — 86900 BLOOD TYPING SEROLOGIC ABO: CPT

## 2025-01-27 PROCEDURE — 85007 BL SMEAR W/DIFF WBC COUNT: CPT

## 2025-01-28 LAB
ABO GROUP BLD: NORMAL
ALBUMIN SERPL BCP-MCNC: 4.2 G/DL (ref 3.2–4.9)
ALBUMIN/GLOB SERPL: 1.7 G/DL
ALP SERPL-CCNC: 64 U/L (ref 30–99)
ALT SERPL-CCNC: 13 U/L (ref 2–50)
ANION GAP SERPL CALC-SCNC: 15 MMOL/L (ref 7–16)
ANISOCYTOSIS BLD QL SMEAR: ABNORMAL
AST SERPL-CCNC: 22 U/L (ref 12–45)
BASOPHILS # BLD AUTO: 2.6 % (ref 0–1.8)
BASOPHILS # BLD: 0.21 K/UL (ref 0–0.12)
BILIRUB SERPL-MCNC: 0.3 MG/DL (ref 0.1–1.5)
BUN SERPL-MCNC: 10 MG/DL (ref 8–22)
CALCIUM ALBUM COR SERPL-MCNC: 9.6 MG/DL (ref 8.5–10.5)
CALCIUM SERPL-MCNC: 9.8 MG/DL (ref 8.5–10.5)
CHLORIDE SERPL-SCNC: 100 MMOL/L (ref 96–112)
CO2 SERPL-SCNC: 24 MMOL/L (ref 20–33)
CREAT SERPL-MCNC: 0.88 MG/DL (ref 0.5–1.4)
DACRYOCYTES BLD QL SMEAR: NORMAL
EOSINOPHIL # BLD AUTO: 0 K/UL (ref 0–0.51)
EOSINOPHIL NFR BLD: 0 % (ref 0–6.9)
ERYTHROCYTE [DISTWIDTH] IN BLOOD BY AUTOMATED COUNT: 71.7 FL (ref 35.9–50)
GFR SERPLBLD CREATININE-BSD FMLA CKD-EPI: 71 ML/MIN/1.73 M 2
GLOBULIN SER CALC-MCNC: 2.5 G/DL (ref 1.9–3.5)
GLUCOSE SERPL-MCNC: 134 MG/DL (ref 65–99)
HCT VFR BLD AUTO: 22 % (ref 37–47)
HGB BLD-MCNC: 6.7 G/DL (ref 12–16)
HYPOCHROMIA BLD QL SMEAR: ABNORMAL
LYMPHOCYTES # BLD AUTO: 2.52 K/UL (ref 1–4.8)
LYMPHOCYTES NFR BLD: 31.9 % (ref 22–41)
MACROCYTES BLD QL SMEAR: ABNORMAL
MAGNESIUM SERPL-MCNC: 1.5 MG/DL (ref 1.5–2.5)
MANUAL DIFF BLD: NORMAL
MCH RBC QN AUTO: 26.3 PG (ref 27–33)
MCHC RBC AUTO-ENTMCNC: 30.5 G/DL (ref 32.2–35.5)
MCV RBC AUTO: 86.3 FL (ref 81.4–97.8)
METAMYELOCYTES NFR BLD MANUAL: 2.6 %
MICROCYTES BLD QL SMEAR: ABNORMAL
MONOCYTES # BLD AUTO: 0.75 K/UL (ref 0–0.85)
MONOCYTES NFR BLD AUTO: 9.5 % (ref 0–13.4)
MORPHOLOGY BLD-IMP: NORMAL
NEUTROPHILS # BLD AUTO: 4.22 K/UL (ref 1.82–7.42)
NEUTROPHILS NFR BLD: 53.4 % (ref 44–72)
NRBC # BLD AUTO: 0.06 K/UL
NRBC BLD-RTO: 0.8 /100 WBC (ref 0–0.2)
OVALOCYTES BLD QL SMEAR: NORMAL
PLATELET # BLD AUTO: 161 K/UL (ref 164–446)
PLATELET BLD QL SMEAR: NORMAL
PLATELETS.RETICULATED NFR BLD AUTO: 5.2 % (ref 0.6–13.1)
PMV BLD AUTO: 11.7 FL (ref 9–12.9)
POIKILOCYTOSIS BLD QL SMEAR: NORMAL
POTASSIUM SERPL-SCNC: 4.2 MMOL/L (ref 3.6–5.5)
PROT SERPL-MCNC: 6.7 G/DL (ref 6–8.2)
RBC # BLD AUTO: 2.55 M/UL (ref 4.2–5.4)
RBC BLD AUTO: PRESENT
RH BLD: NORMAL
SCHISTOCYTES BLD QL SMEAR: NORMAL
SODIUM SERPL-SCNC: 139 MMOL/L (ref 135–145)
STOMATOCYTES BLD QL SMEAR: NORMAL
TARGETS BLD QL SMEAR: NORMAL
TOXIC GRANULES BLD QL SMEAR: NORMAL
WBC # BLD AUTO: 7.9 K/UL (ref 4.8–10.8)

## 2025-01-29 ENCOUNTER — OUTPATIENT INFUSION SERVICES (OUTPATIENT)
Dept: ONCOLOGY | Facility: MEDICAL CENTER | Age: 70
End: 2025-01-29
Payer: MEDICARE

## 2025-01-29 VITALS
DIASTOLIC BLOOD PRESSURE: 61 MMHG | HEART RATE: 136 BPM | WEIGHT: 200.4 LBS | SYSTOLIC BLOOD PRESSURE: 124 MMHG | RESPIRATION RATE: 18 BRPM | BODY MASS INDEX: 37.84 KG/M2 | TEMPERATURE: 97.4 F | HEIGHT: 61 IN | OXYGEN SATURATION: 97 %

## 2025-01-29 DIAGNOSIS — C53.8 MALIGNANT NEOPLASM OVERLAPPING CERVIX UTERI SITE (HCC): ICD-10-CM

## 2025-01-29 PROCEDURE — 700111 HCHG RX REV CODE 636 W/ 250 OVERRIDE (IP): Mod: JZ

## 2025-01-29 PROCEDURE — 700111 HCHG RX REV CODE 636 W/ 250 OVERRIDE (IP): Performed by: STUDENT IN AN ORGANIZED HEALTH CARE EDUCATION/TRAINING PROGRAM

## 2025-01-29 PROCEDURE — 96413 CHEMO IV INFUSION 1 HR: CPT

## 2025-01-29 PROCEDURE — 96375 TX/PRO/DX INJ NEW DRUG ADDON: CPT

## 2025-01-29 PROCEDURE — 700105 HCHG RX REV CODE 258: Performed by: STUDENT IN AN ORGANIZED HEALTH CARE EDUCATION/TRAINING PROGRAM

## 2025-01-29 PROCEDURE — 96415 CHEMO IV INFUSION ADDL HR: CPT

## 2025-01-29 PROCEDURE — 96367 TX/PROPH/DG ADDL SEQ IV INF: CPT

## 2025-01-29 PROCEDURE — 304540 HCHG NITRO SET VENT 2ND TUB

## 2025-01-29 PROCEDURE — A4212 NON CORING NEEDLE OR STYLET: HCPCS

## 2025-01-29 PROCEDURE — 96417 CHEMO IV INFUS EACH ADDL SEQ: CPT

## 2025-01-29 PROCEDURE — 96368 THER/DIAG CONCURRENT INF: CPT

## 2025-01-29 RX ORDER — ONDANSETRON 2 MG/ML
16 INJECTION INTRAMUSCULAR; INTRAVENOUS ONCE
Status: COMPLETED | OUTPATIENT
Start: 2025-01-29 | End: 2025-01-29

## 2025-01-29 RX ORDER — DEXAMETHASONE SODIUM PHOSPHATE 4 MG/ML
12 INJECTION, SOLUTION INTRA-ARTICULAR; INTRALESIONAL; INTRAMUSCULAR; INTRAVENOUS; SOFT TISSUE ONCE
Status: COMPLETED | OUTPATIENT
Start: 2025-01-29 | End: 2025-01-29

## 2025-01-29 RX ORDER — SODIUM CHLORIDE 9 MG/ML
1000 INJECTION, SOLUTION INTRAVENOUS ONCE
Status: COMPLETED | OUTPATIENT
Start: 2025-01-29 | End: 2025-01-29

## 2025-01-29 RX ORDER — MAGNESIUM SULFATE HEPTAHYDRATE 40 MG/ML
2 INJECTION, SOLUTION INTRAVENOUS ONCE
Status: COMPLETED | OUTPATIENT
Start: 2025-01-29 | End: 2025-01-29

## 2025-01-29 RX ADMIN — MAGNESIUM SULFATE HEPTAHYDRATE 2 G: 40 INJECTION, SOLUTION INTRAVENOUS at 09:55

## 2025-01-29 RX ADMIN — CISPLATIN 148 MG: 1 INJECTION INTRAVENOUS at 10:20

## 2025-01-29 RX ADMIN — ONDANSETRON 16 MG: 2 INJECTION INTRAMUSCULAR; INTRAVENOUS at 09:25

## 2025-01-29 RX ADMIN — SODIUM CHLORIDE 196 MG: 9 INJECTION, SOLUTION INTRAVENOUS at 12:30

## 2025-01-29 RX ADMIN — SODIUM CHLORIDE 1000 ML: 9 INJECTION, SOLUTION INTRAVENOUS at 09:10

## 2025-01-29 RX ADMIN — FOSAPREPITANT 150 MG: 150 INJECTION, POWDER, LYOPHILIZED, FOR SOLUTION INTRAVENOUS at 09:30

## 2025-01-29 RX ADMIN — HEPARIN 500 UNITS: 100 SYRINGE at 14:38

## 2025-01-29 RX ADMIN — POTASSIUM CHLORIDE: 2 INJECTION, SOLUTION, CONCENTRATE INTRAVENOUS at 12:30

## 2025-01-29 RX ADMIN — DEXAMETHASONE SODIUM PHOSPHATE 12 MG: 4 INJECTION INTRA-ARTICULAR; INTRALESIONAL; INTRAMUSCULAR; INTRAVENOUS; SOFT TISSUE at 09:20

## 2025-01-29 ASSESSMENT — FIBROSIS 4 INDEX: FIB4 SCORE: 2.62

## 2025-01-29 NOTE — PROGRESS NOTES
Patient arrived to unit for Cisplatin and Etoposide. Labs from 1/27 reviewed. Patient's Hgb came back at 6.7. Per MAYRA [place], okay to proceed with treatment. Educated patient on blood transfusions, including purpose, risks, and benefits. She refused. Mg came back at 1.5. 2 g MgSO4 ordered per protocol.    Port accessed per facility protocol. Blood return noted. Flushed with 10 mL NS. No erythema, edema, or pain noted.    1L NS administered.    Premeds administered.    Cisplatin and 2 g MgSO4 administered concurrently.    Etoposide and 1 g MgSO4 and 20 mEq KCl in 1000 mL NS administered concurrently.     Blood return noted from Port. Flushed with 20 mL NS and 500 units Heparin. No erythema, edema, or pain noted. Port left accessed for appointment tomorrow.    Patient tolerated treatment without any adverse effects. Future appointments confirmed. Patient discharged home in stable condition.

## 2025-01-29 NOTE — PROGRESS NOTES
"Pharmacy Chemotherapy Verification:    Dx: Cervical cancer overlapping uteri         Protocol: Cisplatin/etoposide     Cisplatin 75 mg/m2 IV over 2 hours on day 1  Etoposide 100 mg/m2 IV over 60 min daily on days 1-3  Q21-28 days x 4-6 cycles  NCCN Guidelines for Cervical cancer V.1.2024  Sean WK, et al - J Clin Oncol. 1985 Nov;3(11):1471-7.  Emmanuelle DR, et al - J Clin Oncol. 2011 Jun 1;29(16):2215-22.  Noah HB, et al - J Clin Oncol. 2005 Jun 1;23(16):8962-9.  Moshe PE, et al - Eur J Cancer Clin Oncol. 1987 Sep;23(9):1409-11.  Amelia G et al - Eur J Cancer Clin Oncol. 1987 Nov;23(11):1697-9.  Renetta S et al - Gynecol Oncol Rep. 2022 Aug 4:43:002068.    Allergies:  Morphine and Morphine     /61   Pulse (!) 136   Temp 36.3 °C (97.4 °F) (Temporal)   Resp 18   Ht 1.54 m (5' 0.63\")   Wt 90.9 kg (200 lb 6.4 oz)   SpO2 97%   BMI 38.33 kg/m²  Body surface area is 1.97 meters squared.    All lab results 1/27/25 within treatment parameters. Except hgb < 8. Pt refusing blood transfusion.   MD aware of all current lab results. Orders received to proceed with treatment.     Drug Order   (Drug name, dose, route, IV Fluid & volume, frequency, number of doses) Cycle 5 Day 1 of 3  Previous treatment: C4 Jan 8-10, 2025   Medication = cisplatin  Base Dose= 75 mg/m2  Calc Dose: Base Dose x 1.97 m2 = 147.8 mg  Final Dose = 148 mg  Route = IV  Fluid & Volume =  mL  Admin Duration = Over 120 minutes   Day 1 only       <10% difference, ok to treat with final dose   Medication = etoposide  Base Dose= 100 mg/m2   Calc Dose: Base Dose x 1.97 m2 = 197 mg  Final Dose = 196 mg  Route = IV  Fluid & Volume =  mL  Admin Duration = Over 1 hour   Days 1-3       <10% difference, ok to treat with final dose   By my signature below, I confirm this process was performed independently with the BSA and all final chemotherapy dosing calculations congruent. I have reviewed the above chemotherapy order and that my " calculation of the final dose and BSA (when applicable) corroborate those calculations of the  pharmacist. Discrepancies of 10% or greater in the written dose have been addressed and documented within the EPIC Progress notes.    Naeem BolesD

## 2025-01-29 NOTE — PROGRESS NOTES
"Pharmacy Chemotherapy Calculations    Dx: Cervical cancer    Cycle 5, Day 1-3  Previous treatment = C4D1-3 1/8-1/10/25    Protocol: Cisplatin/Etoposide  Cisplatin 75 mg/m2 IV over 2 hours on Day 1  Etoposide 100 mg/m2 IV over 60 minutes daily on Days 1-3   21- to 28-day cycle for 4-6 cycles  NCCN Guidelines for Cervical Cancer V.1.2024.  Sean WK, et al. J Clin Oncol. 1985;3(11):1471-7.  Emmanuelle SUAREZ, et al. J Clin Oncol. 2011;29(16):2215-22.  Noah HB, et al. J Clin Oncol. 2005;23(16):3752- 9.  Postm PE, et al. Eur J Cancer Clin Oncol. 1987;23(9):1409-11.  Amelia G, et al. Eur J Cancer Clin Oncol. 1987;23(11):1697-9.  Maryjanehandas S , et al. Gynecol Oncol Rep. 2022;43:766104.    Allergies:  Morphine and Morphine       /61   Pulse (!) 136   Temp 36.3 °C (97.4 °F) (Temporal)   Resp 18   Ht 1.54 m (5' 0.63\")   Wt 90.9 kg (200 lb 6.4 oz)   SpO2 97%   BMI 38.33 kg/m²  Body surface area is 1.97 meters squared.    Labs 1/27/25:  ANC~ 4220 Plt = 161k   Hgb = 6.7 okay to proceed with treatment per DEON Yost - also aware pt is refusing blood transfusion - plan to redraw on D3 and re-evaluate per PAC     SCr = 0.88 mg/dL CrCl ~ 86mL/min   AST/ALT/AP = WNL TBili = 0.3  K+ = 4.2    Cisplatin 75 mg/m² x 1.97 m² = 147.8 mg   <10% difference, OK to treat with final dose = 148 mg on Day 1 only    Etoposide 100 mg/m² x 1.97 m² = 197 mg   <10% difference, OK to treat with final dose = 196 mg on Days 1-3    Christine Mojica, PharmD  "

## 2025-01-29 NOTE — PROGRESS NOTES
Chemotherapy Verification - PRIMARY RN      Height = 1.54 m  Weight = 90.9 kg  BSA = 1.97 m2       Medication: Cisplatin  Dose: 147.75 mg/kg  Calculated Dose: 147.75 mg                             (In mg/m2, AUC, mg/kg)     Medication: Etoposide  Dose: 100 mg/m2  Calculated Dose: 197 mg                             (In mg/m2, AUC, mg/kg)    I confirm this process was performed independently with the BSA and all final chemotherapy dosing calculations congruent.  Any discrepancies of 10% or greater have been addressed with the chemotherapy pharmacist. The resolution of the discrepancy has been documented in the EPIC progress notes.

## 2025-01-29 NOTE — PROGRESS NOTES
Chemotherapy Verification - SECONDARY RN       Height = 1.54m  Weight = 90.9kg  BSA = 1.97m2       Medication: CISplatin (Platinol)  Dose: 75mg/m2  Calculated Dose: 147.75mg                             (In mg/m2, AUC, mg/kg)     Medication: etoposide (Toposar)  Dose: 100mg/m2  Calculated Dose: 197mg                             (In mg/m2, AUC, mg/kg)      I confirm that this process was performed independently.

## 2025-01-30 ENCOUNTER — OUTPATIENT INFUSION SERVICES (OUTPATIENT)
Dept: ONCOLOGY | Facility: MEDICAL CENTER | Age: 70
End: 2025-01-30
Payer: MEDICARE

## 2025-01-30 VITALS
HEART RATE: 105 BPM | HEIGHT: 61 IN | RESPIRATION RATE: 18 BRPM | SYSTOLIC BLOOD PRESSURE: 115 MMHG | DIASTOLIC BLOOD PRESSURE: 62 MMHG | BODY MASS INDEX: 38.83 KG/M2 | TEMPERATURE: 96.9 F | OXYGEN SATURATION: 95 % | WEIGHT: 205.69 LBS

## 2025-01-30 DIAGNOSIS — C53.8 MALIGNANT NEOPLASM OVERLAPPING CERVIX UTERI SITE (HCC): ICD-10-CM

## 2025-01-30 PROCEDURE — 700105 HCHG RX REV CODE 258: Performed by: SPECIALIST

## 2025-01-30 PROCEDURE — 700111 HCHG RX REV CODE 636 W/ 250 OVERRIDE (IP): Performed by: SPECIALIST

## 2025-01-30 PROCEDURE — 700111 HCHG RX REV CODE 636 W/ 250 OVERRIDE (IP)

## 2025-01-30 PROCEDURE — 96375 TX/PRO/DX INJ NEW DRUG ADDON: CPT

## 2025-01-30 PROCEDURE — 700111 HCHG RX REV CODE 636 W/ 250 OVERRIDE (IP): Mod: JZ | Performed by: STUDENT IN AN ORGANIZED HEALTH CARE EDUCATION/TRAINING PROGRAM

## 2025-01-30 PROCEDURE — 304540 HCHG NITRO SET VENT 2ND TUB

## 2025-01-30 RX ORDER — ONDANSETRON 2 MG/ML
8 INJECTION INTRAMUSCULAR; INTRAVENOUS ONCE
Status: COMPLETED | OUTPATIENT
Start: 2025-01-30 | End: 2025-01-30

## 2025-01-30 RX ADMIN — ONDANSETRON 8 MG: 2 INJECTION INTRAMUSCULAR; INTRAVENOUS at 10:51

## 2025-01-30 RX ADMIN — SODIUM CHLORIDE 196 MG: 9 INJECTION, SOLUTION INTRAVENOUS at 11:01

## 2025-01-30 RX ADMIN — HEPARIN 500 UNITS: 100 SYRINGE at 12:08

## 2025-01-30 ASSESSMENT — FIBROSIS 4 INDEX: FIB4 SCORE: 2.62

## 2025-01-30 NOTE — PROGRESS NOTES
Chemotherapy Verification - SECONDARY RN   C5 D2    Height = 154 cm  Weight = 93.3 kg  BSA = 2 m^2       Medication: etoposide (TOPOSAR)  Dose: 100 mg/m2  Calculated Dose: 200 mg                             (In mg/m2, AUC, mg/kg)       I confirm that this process was performed independently.    I have reviewed and confirmed nurses' notes for patient's medications, allergies, medical history, and surgical history.

## 2025-01-30 NOTE — PROGRESS NOTES
"Pharmacy Chemotherapy Verification:    Dx: Cervical cancer overlapping uteri         Protocol: Cisplatin/etoposide     *Dosing Reference*  Cisplatin 75 mg/m2 IV over 2 hours on day 1  Etoposide 100 mg/m2 IV over 60 min daily on days 1-3  Q21-28 days x 4-6 cycles  NCCN Guidelines for Cervical cancer V.1.2024  Sean WK, et al - J Clin Oncol. 1985 Nov;3(11):1471-7.  Emmanuelle DR, et al - J Clin Oncol. 2011 Jun 1;29(16):2215-22.  Noah HB, et al - J Clin Oncol. 2005 Jun 1;23(16):4382-9.  Moshe PE, et al - Eur J Cancer Clin Oncol. 1987 Sep;23(9):1409-11.  Amelia G et al - Eur J Cancer Clin Oncol. 1987 Nov;23(11):1697-9.  Renetta S, et al - Gynecol Oncol Rep. 2022 Aug 4:43:870513.    Allergies:  Morphine and Morphine     /62   Pulse (!) 105   Temp 36.1 °C (96.9 °F) (Temporal)   Resp 18   Ht 1.54 m (5' 0.63\")   Wt 93.3 kg (205 lb 11 oz)   SpO2 95%   BMI 39.34 kg/m²  Body surface area is 2 meters squared.    All lab results 1/27/25 within treatment parameters. Except hgb < 8. Pt refusing blood transfusion.   MD aware of all current lab results. Orders received to proceed with treatment.     Drug Order   (Drug name, dose, route, IV Fluid & volume, frequency, number of doses) Cycle 5, Day 2 of 3  Previous treatment: C4 Jan 8-10, 2025   Medication = cisplatin  Base Dose= 75 mg/m2  Calc Dose: Base Dose x 1.97 m2 = 147.8 mg  Final Dose = 148 mg  Route = IV  Fluid & Volume =  mL  Admin Duration = Over 120 minutes   Day 1 only       <10% difference, ok to treat with final dose   Medication = etoposide  Base Dose = 100 mg/m2   Calc Dose: Base Dose x 2 m2 = 200 mg  Final Dose = 196 mg  Route = IV  Fluid & Volume =  mL  Admin Duration = Over 1 hour   Days 1-3       <10% difference, ok to treat with final dose   By my signature below, I confirm this process was performed independently with the BSA and all final chemotherapy dosing calculations congruent. I have reviewed the above chemotherapy order and " that my calculation of the final dose and BSA (when applicable) corroborate those calculations of the  pharmacist. Discrepancies of 10% or greater in the written dose have been addressed and documented within the EPIC Progress notes.    Nidia Perez, PharmD

## 2025-01-30 NOTE — PROGRESS NOTES
Chemotherapy Verification - PRIMARY RN      Height = 1.54m  Weight = 93.3kg  BSA = 2m2       Medication: etoposide (Toposar)  Dose: 100mg/m2  Calculated Dose: 200mg                             (In mg/m2, AUC, mg/kg)           I confirm this process was performed independently with the BSA and all final chemotherapy dosing calculations congruent.  Any discrepancies of 10% or greater have been addressed with the chemotherapy pharmacist. The resolution of the discrepancy has been documented in the EPIC progress notes.

## 2025-01-31 ENCOUNTER — OUTPATIENT INFUSION SERVICES (OUTPATIENT)
Dept: ONCOLOGY | Facility: MEDICAL CENTER | Age: 70
End: 2025-01-31
Payer: MEDICARE

## 2025-01-31 VITALS
DIASTOLIC BLOOD PRESSURE: 76 MMHG | OXYGEN SATURATION: 96 % | HEIGHT: 61 IN | TEMPERATURE: 97.6 F | SYSTOLIC BLOOD PRESSURE: 144 MMHG | WEIGHT: 206.57 LBS | HEART RATE: 87 BPM | RESPIRATION RATE: 18 BRPM | BODY MASS INDEX: 39 KG/M2

## 2025-01-31 DIAGNOSIS — C53.8 MALIGNANT NEOPLASM OVERLAPPING CERVIX UTERI SITE (HCC): ICD-10-CM

## 2025-01-31 LAB
ABO GROUP BLD: NORMAL
BARCODED ABORH UBTYP: 6200
BARCODED ABORH UBTYP: 6200
BARCODED PRD CODE UBPRD: NORMAL
BARCODED PRD CODE UBPRD: NORMAL
BARCODED UNIT NUM UBUNT: NORMAL
BARCODED UNIT NUM UBUNT: NORMAL
BASOPHILS # BLD AUTO: 0 % (ref 0–1.8)
BASOPHILS # BLD: 0 K/UL (ref 0–0.12)
BLD GP AB SCN SERPL QL: NORMAL
COMPONENT R 8504R: NORMAL
COMPONENT R 8504R: NORMAL
EOSINOPHIL # BLD AUTO: 0 K/UL (ref 0–0.51)
EOSINOPHIL NFR BLD: 0 % (ref 0–6.9)
ERYTHROCYTE [DISTWIDTH] IN BLOOD BY AUTOMATED COUNT: 75.2 FL (ref 35.9–50)
HCT VFR BLD AUTO: 18.9 % (ref 37–47)
HGB BLD-MCNC: 5.7 G/DL (ref 12–16)
IMM GRANULOCYTES # BLD AUTO: 0.01 K/UL (ref 0–0.11)
IMM GRANULOCYTES NFR BLD AUTO: 0.3 % (ref 0–0.9)
LYMPHOCYTES # BLD AUTO: 1.27 K/UL (ref 1–4.8)
LYMPHOCYTES NFR BLD: 35.4 % (ref 22–41)
MCH RBC QN AUTO: 25.9 PG (ref 27–33)
MCHC RBC AUTO-ENTMCNC: 30.2 G/DL (ref 32.2–35.5)
MCV RBC AUTO: 85.9 FL (ref 81.4–97.8)
MONOCYTES # BLD AUTO: 0.39 K/UL (ref 0–0.85)
MONOCYTES NFR BLD AUTO: 10.9 % (ref 0–13.4)
NEUTROPHILS # BLD AUTO: 1.92 K/UL (ref 1.82–7.42)
NEUTROPHILS NFR BLD: 53.4 % (ref 44–72)
NRBC # BLD AUTO: 0 K/UL
NRBC BLD-RTO: 0 /100 WBC (ref 0–0.2)
OUTPT INFUS CBC COMMENT OICOM: ABNORMAL
PLATELET # BLD AUTO: 194 K/UL (ref 164–446)
PMV BLD AUTO: 11 FL (ref 9–12.9)
PRODUCT TYPE UPROD: NORMAL
PRODUCT TYPE UPROD: NORMAL
RBC # BLD AUTO: 2.2 M/UL (ref 4.2–5.4)
RH BLD: NORMAL
UNIT STATUS USTAT: NORMAL
UNIT STATUS USTAT: NORMAL
WBC # BLD AUTO: 3.6 K/UL (ref 4.8–10.8)

## 2025-01-31 PROCEDURE — 700105 HCHG RX REV CODE 258: Performed by: SPECIALIST

## 2025-01-31 PROCEDURE — 700111 HCHG RX REV CODE 636 W/ 250 OVERRIDE (IP): Mod: JZ | Performed by: STUDENT IN AN ORGANIZED HEALTH CARE EDUCATION/TRAINING PROGRAM

## 2025-01-31 PROCEDURE — A4212 NON CORING NEEDLE OR STYLET: HCPCS

## 2025-01-31 PROCEDURE — 96375 TX/PRO/DX INJ NEW DRUG ADDON: CPT

## 2025-01-31 PROCEDURE — 96413 CHEMO IV INFUSION 1 HR: CPT

## 2025-01-31 PROCEDURE — 700102 HCHG RX REV CODE 250 W/ 637 OVERRIDE(OP): Performed by: STUDENT IN AN ORGANIZED HEALTH CARE EDUCATION/TRAINING PROGRAM

## 2025-01-31 PROCEDURE — A9270 NON-COVERED ITEM OR SERVICE: HCPCS | Performed by: STUDENT IN AN ORGANIZED HEALTH CARE EDUCATION/TRAINING PROGRAM

## 2025-01-31 PROCEDURE — 700111 HCHG RX REV CODE 636 W/ 250 OVERRIDE (IP): Performed by: SPECIALIST

## 2025-01-31 PROCEDURE — 306780 HCHG STAT FOR TRANSFUSION PER CASE

## 2025-01-31 RX ORDER — HYDROCORTISONE SODIUM SUCCINATE 100 MG/2ML
100 INJECTION INTRAMUSCULAR; INTRAVENOUS ONCE
Status: CANCELLED | OUTPATIENT
Start: 2025-01-31 | End: 2025-01-31

## 2025-01-31 RX ORDER — DIPHENHYDRAMINE HCL 25 MG
25 TABLET ORAL ONCE
Status: COMPLETED | OUTPATIENT
Start: 2025-01-31 | End: 2025-01-31

## 2025-01-31 RX ORDER — ACETAMINOPHEN 325 MG/1
650 TABLET ORAL PRN
Status: CANCELLED | OUTPATIENT
Start: 2025-01-31

## 2025-01-31 RX ORDER — ACETAMINOPHEN 325 MG/1
650 TABLET ORAL ONCE
Status: CANCELLED | OUTPATIENT
Start: 2025-01-31

## 2025-01-31 RX ORDER — DIPHENHYDRAMINE HYDROCHLORIDE 50 MG/ML
25 INJECTION INTRAMUSCULAR; INTRAVENOUS PRN
Status: CANCELLED | OUTPATIENT
Start: 2025-01-31

## 2025-01-31 RX ORDER — DIPHENHYDRAMINE HCL 25 MG
25 TABLET ORAL ONCE
Status: CANCELLED | OUTPATIENT
Start: 2025-01-31 | End: 2025-01-31

## 2025-01-31 RX ORDER — ACETAMINOPHEN 325 MG/1
650 TABLET ORAL ONCE
Status: COMPLETED | OUTPATIENT
Start: 2025-01-31 | End: 2025-01-31

## 2025-01-31 RX ORDER — HYDROCORTISONE SODIUM SUCCINATE 100 MG/2ML
100 INJECTION INTRAMUSCULAR; INTRAVENOUS ONCE
Status: COMPLETED | OUTPATIENT
Start: 2025-01-31 | End: 2025-01-31

## 2025-01-31 RX ORDER — ONDANSETRON 2 MG/ML
8 INJECTION INTRAMUSCULAR; INTRAVENOUS ONCE
Status: COMPLETED | OUTPATIENT
Start: 2025-01-31 | End: 2025-01-31

## 2025-01-31 RX ORDER — HYDROCORTISONE SODIUM SUCCINATE 100 MG/2ML
100 INJECTION INTRAMUSCULAR; INTRAVENOUS PRN
Status: CANCELLED | OUTPATIENT
Start: 2025-01-31

## 2025-01-31 RX ORDER — SODIUM CHLORIDE 9 MG/ML
INJECTION, SOLUTION INTRAVENOUS CONTINUOUS
Status: CANCELLED | OUTPATIENT
Start: 2025-01-31

## 2025-01-31 RX ADMIN — SODIUM CHLORIDE 196 MG: 9 INJECTION, SOLUTION INTRAVENOUS at 12:33

## 2025-01-31 RX ADMIN — HEPARIN 500 UNITS: 100 SYRINGE at 17:55

## 2025-01-31 RX ADMIN — ONDANSETRON 8 MG: 2 INJECTION INTRAMUSCULAR; INTRAVENOUS at 11:32

## 2025-01-31 RX ADMIN — ACETAMINOPHEN 650 MG: 325 TABLET ORAL at 13:18

## 2025-01-31 RX ADMIN — HYDROCORTISONE SODIUM SUCCINATE 100 MG: 100 INJECTION, POWDER, FOR SOLUTION INTRAMUSCULAR; INTRAVENOUS at 13:19

## 2025-01-31 RX ADMIN — DIPHENHYDRAMINE HYDROCHLORIDE 25 MG: 25 TABLET ORAL at 13:18

## 2025-01-31 ASSESSMENT — FIBROSIS 4 INDEX: FIB4 SCORE: 2.62

## 2025-01-31 NOTE — PROGRESS NOTES
Pt arrives to [facility] for cycle 5 day 3 of Etoposide for cervical cancer.   Discussed plan of care with pt.  Pt denies s/sx of infection.  Pt reports fatigue and loss of appetite.  Pt has occasional dyspnea on exertion.  RUC port flushed with NS and brisk blood return observed.  CBC drawn.  Received call from lab with critical hemoglobin 5.7 today.  Pt had discussion with MAYRA Dwyer at chair side re: the importance of receiving blood transfusion.  Pt was hesitant of receiving blood products but, eventually agreed to blood transfusion.  COD drawn and sent to blood bank.  Pre-medications given.  Etoposide infused without adverse reaction.  Port flushed with NS and blood tubing connected to the pt.  Blood consents signed by the pt.  Two units of PRBC transfused without adverse reaction.  Port flushed with NS and was heparin locked.  Shukla needle removed intact.  Site covered with gauze/tape.  Pt has lab order to have CBC re-checked next week as instructed by her doctor.  Pt dc home with her spouse.

## 2025-01-31 NOTE — PROGRESS NOTES
Chemotherapy Verification - SECONDARY RN       Height = 154 cm  Weight = 93.7 kg  BSA = 2 m2       Medication: Etoposide  Dose: 100 mg/m2  Calculated Dose: 200 mg                             (In mg/m2, AUC, mg/kg)       I confirm that this process was performed independently.

## 2025-01-31 NOTE — PROGRESS NOTES
Patient came into infusion independently. Orders and vitals reviewed, assessment done. Port flushed with blood return noted. Labs reviewed, patient meets parameters to proceed with treatment today with order to proceed. Cycle 5 day 2 of Etoposide treatment given as ordered with no adverse events. Port flushed and left accessed for treatment tomorrow. Patient left in stable condition with next appointment confirmed.

## 2025-01-31 NOTE — PROGRESS NOTES
"Pharmacy Chemotherapy Verification:    Dx: Cervical cancer overlapping uteri         Protocol: Cisplatin/etoposide     *Dosing Reference*  Cisplatin 75 mg/m2 IV over 2 hours on day 1  Etoposide 100 mg/m2 IV over 60 min daily on days 1-3  Q21-28 days x 4-6 cycles  NCCN Guidelines for Cervical cancer V.1.2024  Sean WK, et al - J Clin Oncol. 1985 Nov;3(11):1471-7.  Emmanuelle DR, et al - J Clin Oncol. 2011 Jun 1;29(16):2215-22.  Noah HB, et al - J Clin Oncol. 2005 Jun 1;23(16):6972-9.  Moshe PE, et al - Eur J Cancer Clin Oncol. 1987 Sep;23(9):1409-11.  Amelia G et al - Eur J Cancer Clin Oncol. 1987 Nov;23(11):1697-9.  Renetta S, et al - Gynecol Oncol Rep. 2022 Aug 4:43:720206.    Allergies:  Morphine and Morphine     /62   Pulse (!) 122   Temp 36.1 °C (97 °F) (Temporal)   Resp 18   Ht 1.54 m (5' 0.63\")   Wt 93.7 kg (206 lb 9.1 oz)   SpO2 97%   BMI 39.51 kg/m²  Body surface area is 2 meters squared.    All lab results 1/27/25 within treatment parameters. Except hgb < 8. Pt refusing blood transfusion.   MD aware of all current lab results. Orders received to proceed with treatment.     Drug Order   (Drug name, dose, route, IV Fluid & volume, frequency, number of doses) Cycle 5, Day 3 of 3  Previous treatment: C4 Jan 8-10, 2025   Medication = cisplatin  Base Dose= 75 mg/m2  Calc Dose: Base Dose x 1.97 m2 = 147.8 mg  Final Dose = 148 mg  Route = IV  Fluid & Volume =  mL  Admin Duration = Over 120 minutes   Day 1 only       <10% difference, ok to treat with final dose   Medication = etoposide  Base Dose = 100 mg/m2   Calc Dose: Base Dose x 2 m2 = 200 mg  Final Dose = 196 mg  Route = IV  Fluid & Volume =  mL  Admin Duration = Over 1 hour   Days 1-3       <10% difference, ok to treat with final dose   By my signature below, I confirm this process was performed independently with the BSA and all final chemotherapy dosing calculations congruent. I have reviewed the above chemotherapy order and " that my calculation of the final dose and BSA (when applicable) corroborate those calculations of the  pharmacist. Discrepancies of 10% or greater in the written dose have been addressed and documented within the EPIC Progress notes.    Abran Junior, PharmD

## 2025-01-31 NOTE — PROGRESS NOTES
Chemotherapy Verification - PRIMARY RN    C5 D3    Height = 154 cm  Weight = 93.7 kg  BSA = 2.0 m^2       Medication: Etoposide  Dose: 100 mg/m^2  Calculated Dose: 200 mg                             (In mg/m2, AUC, mg/kg)     I confirm this process was performed independently with the BSA and all final chemotherapy dosing calculations congruent.  Any discrepancies of 10% or greater have been addressed with the chemotherapy pharmacist. The resolution of the discrepancy has been documented in the EPIC progress notes.

## 2025-02-05 ENCOUNTER — HOSPITAL ENCOUNTER (OUTPATIENT)
Dept: RADIOLOGY | Facility: MEDICAL CENTER | Age: 70
End: 2025-02-05
Attending: SPECIALIST
Payer: MEDICARE

## 2025-02-05 ENCOUNTER — HOSPITAL ENCOUNTER (OUTPATIENT)
Dept: RADIOLOGY | Facility: MEDICAL CENTER | Age: 70
End: 2025-02-05
Payer: MEDICARE

## 2025-02-07 ENCOUNTER — PATIENT OUTREACH (OUTPATIENT)
Dept: ONCOLOGY | Facility: MEDICAL CENTER | Age: 70
End: 2025-02-07
Payer: MEDICARE

## 2025-02-07 NOTE — PROGRESS NOTES
On February 7, 2025, Oncology Social Worker Jane Song contacted pt. via telephone to inform her there were no [employer] gas cards available at this time.  OSEVA Song asked pt. if she could mail her a [facility] application for financial assistance to help off set the cost of gas while they await gas cards in the coming months.  Pt. agreed to complete application once she receives it and bring back with her to upcoming appointment.  Pt. shared she has an upcoming Radiation appointment and knows she will have radiation for five weeks.  Pt. shared she would like to drive back and forth home to [place], [place] with the exception of one of the nights when she has chemotherapy treatment for six hours.  RADHA Song informed pt. lodging assistance was available for pt. if she decided she needed to stay several days of the 5 weeks of radiation treatment if needed.  Pt. thanked RADHA Song and agreed to keep her informed once dates have been set up for what days she will need lodging.

## 2025-02-11 ASSESSMENT — LIFESTYLE VARIABLES
TOBACCO_USE: NO
SMOKING_STATUS: NO

## 2025-02-14 ENCOUNTER — HOSPITAL ENCOUNTER (OUTPATIENT)
Dept: RADIATION ONCOLOGY | Facility: MEDICAL CENTER | Age: 70
End: 2025-02-14
Attending: RADIOLOGY
Payer: MEDICARE

## 2025-02-14 ENCOUNTER — HOSPITAL ENCOUNTER (OUTPATIENT)
Dept: LAB | Facility: MEDICAL CENTER | Age: 70
End: 2025-02-14
Attending: RADIOLOGY
Payer: MEDICARE

## 2025-02-14 VITALS
RESPIRATION RATE: 14 BRPM | BODY MASS INDEX: 37.92 KG/M2 | DIASTOLIC BLOOD PRESSURE: 84 MMHG | OXYGEN SATURATION: 97 % | TEMPERATURE: 97.3 F | WEIGHT: 200.84 LBS | HEART RATE: 98 BPM | HEIGHT: 61 IN | SYSTOLIC BLOOD PRESSURE: 153 MMHG

## 2025-02-14 DIAGNOSIS — D64.81 ANEMIA ASSOCIATED WITH CHEMOTHERAPY: ICD-10-CM

## 2025-02-14 DIAGNOSIS — C53.8 MALIGNANT NEOPLASM OVERLAPPING CERVIX UTERI SITE (HCC): ICD-10-CM

## 2025-02-14 DIAGNOSIS — T45.1X5A ANEMIA ASSOCIATED WITH CHEMOTHERAPY: ICD-10-CM

## 2025-02-14 LAB
ALBUMIN SERPL BCP-MCNC: 3.9 G/DL (ref 3.2–4.9)
ALBUMIN/GLOB SERPL: 1.2 G/DL
ALP SERPL-CCNC: 46 U/L (ref 30–99)
ALT SERPL-CCNC: 12 U/L (ref 2–50)
ANION GAP SERPL CALC-SCNC: 13 MMOL/L (ref 7–16)
ANISOCYTOSIS BLD QL SMEAR: ABNORMAL
AST SERPL-CCNC: 16 U/L (ref 12–45)
BASOPHILS # BLD AUTO: 0 % (ref 0–1.8)
BASOPHILS # BLD: 0 K/UL (ref 0–0.12)
BILIRUB SERPL-MCNC: 0.3 MG/DL (ref 0.1–1.5)
BUN SERPL-MCNC: 13 MG/DL (ref 8–22)
CALCIUM ALBUM COR SERPL-MCNC: 10.7 MG/DL (ref 8.5–10.5)
CALCIUM SERPL-MCNC: 10.6 MG/DL (ref 8.5–10.5)
CHLORIDE SERPL-SCNC: 102 MMOL/L (ref 96–112)
CO2 SERPL-SCNC: 26 MMOL/L (ref 20–33)
CREAT SERPL-MCNC: 1.23 MG/DL (ref 0.5–1.4)
EOSINOPHIL # BLD AUTO: 0 K/UL (ref 0–0.51)
EOSINOPHIL NFR BLD: 0 % (ref 0–6.9)
ERYTHROCYTE [DISTWIDTH] IN BLOOD BY AUTOMATED COUNT: 57.1 FL (ref 35.9–50)
GFR SERPLBLD CREATININE-BSD FMLA CKD-EPI: 47 ML/MIN/1.73 M 2
GLOBULIN SER CALC-MCNC: 3.3 G/DL (ref 1.9–3.5)
GLUCOSE SERPL-MCNC: 118 MG/DL (ref 65–99)
HCT VFR BLD AUTO: 22.7 % (ref 37–47)
HGB BLD-MCNC: 7.2 G/DL (ref 12–16)
HYPOCHROMIA BLD QL SMEAR: ABNORMAL
LYMPHOCYTES # BLD AUTO: 3.03 K/UL (ref 1–4.8)
LYMPHOCYTES NFR BLD: 79.8 % (ref 22–41)
MANUAL DIFF BLD: NORMAL
MCH RBC QN AUTO: 27.2 PG (ref 27–33)
MCHC RBC AUTO-ENTMCNC: 31.7 G/DL (ref 32.2–35.5)
MCV RBC AUTO: 85.7 FL (ref 81.4–97.8)
MONOCYTES # BLD AUTO: 0.43 K/UL (ref 0–0.85)
MONOCYTES NFR BLD AUTO: 11.4 % (ref 0–13.4)
MORPHOLOGY BLD-IMP: NORMAL
NEUTROPHILS # BLD AUTO: 0.33 K/UL (ref 1.82–7.42)
NEUTROPHILS NFR BLD: 8.8 % (ref 44–72)
NRBC # BLD AUTO: 0.04 K/UL
NRBC BLD-RTO: 1 /100 WBC (ref 0–0.2)
OVALOCYTES BLD QL SMEAR: NORMAL
PLATELET # BLD AUTO: 47 K/UL (ref 164–446)
PLATELET BLD QL SMEAR: NORMAL
PLATELETS.RETICULATED NFR BLD AUTO: 10.8 % (ref 0.6–13.1)
POIKILOCYTOSIS BLD QL SMEAR: NORMAL
POTASSIUM SERPL-SCNC: 4.1 MMOL/L (ref 3.6–5.5)
PROT SERPL-MCNC: 7.2 G/DL (ref 6–8.2)
RBC # BLD AUTO: 2.65 M/UL (ref 4.2–5.4)
RBC BLD AUTO: PRESENT
SODIUM SERPL-SCNC: 141 MMOL/L (ref 135–145)
WBC # BLD AUTO: 3.8 K/UL (ref 4.8–10.8)

## 2025-02-14 PROCEDURE — 85055 RETICULATED PLATELET ASSAY: CPT

## 2025-02-14 PROCEDURE — A4648 IMPLANTABLE TISSUE MARKER: HCPCS | Performed by: RADIOLOGY

## 2025-02-14 PROCEDURE — 80053 COMPREHEN METABOLIC PANEL: CPT

## 2025-02-14 PROCEDURE — 49411 INS MARK ABD/PEL FOR RT PERQ: CPT | Performed by: RADIOLOGY

## 2025-02-14 PROCEDURE — 99205 OFFICE O/P NEW HI 60 MIN: CPT | Mod: 25 | Performed by: RADIOLOGY

## 2025-02-14 PROCEDURE — 99214 OFFICE O/P EST MOD 30 MIN: CPT | Mod: 25 | Performed by: RADIOLOGY

## 2025-02-14 PROCEDURE — 85027 COMPLETE CBC AUTOMATED: CPT

## 2025-02-14 PROCEDURE — 85007 BL SMEAR W/DIFF WBC COUNT: CPT

## 2025-02-14 PROCEDURE — 36415 COLL VENOUS BLD VENIPUNCTURE: CPT

## 2025-02-14 RX ORDER — 0.9 % SODIUM CHLORIDE 0.9 %
VIAL (ML) INJECTION PRN
Status: CANCELLED | OUTPATIENT
Start: 2025-02-16

## 2025-02-14 RX ORDER — SODIUM CHLORIDE 9 MG/ML
INJECTION, SOLUTION INTRAVENOUS CONTINUOUS
Status: CANCELLED | OUTPATIENT
Start: 2025-02-16

## 2025-02-14 RX ORDER — DIPHENHYDRAMINE HCL 25 MG
25 TABLET ORAL ONCE
Status: CANCELLED | OUTPATIENT
Start: 2025-02-16 | End: 2025-02-16

## 2025-02-14 RX ORDER — ACETAMINOPHEN 325 MG/1
650 TABLET ORAL ONCE
Status: CANCELLED | OUTPATIENT
Start: 2025-02-16

## 2025-02-14 RX ORDER — 0.9 % SODIUM CHLORIDE 0.9 %
3 VIAL (ML) INJECTION PRN
Status: CANCELLED | OUTPATIENT
Start: 2025-02-16

## 2025-02-14 RX ORDER — HYDROCORTISONE SODIUM SUCCINATE 100 MG/2ML
100 INJECTION INTRAMUSCULAR; INTRAVENOUS ONCE
Status: CANCELLED | OUTPATIENT
Start: 2025-02-16 | End: 2025-02-16

## 2025-02-14 RX ORDER — HYDROCORTISONE SODIUM SUCCINATE 100 MG/2ML
100 INJECTION INTRAMUSCULAR; INTRAVENOUS PRN
Status: CANCELLED | OUTPATIENT
Start: 2025-02-16

## 2025-02-14 RX ORDER — ACETAMINOPHEN 325 MG/1
650 TABLET ORAL PRN
Status: CANCELLED | OUTPATIENT
Start: 2025-02-16

## 2025-02-14 RX ORDER — 0.9 % SODIUM CHLORIDE 0.9 %
10 VIAL (ML) INJECTION PRN
Status: CANCELLED | OUTPATIENT
Start: 2025-02-16

## 2025-02-14 RX ORDER — DIPHENHYDRAMINE HYDROCHLORIDE 50 MG/ML
25 INJECTION INTRAMUSCULAR; INTRAVENOUS PRN
Status: CANCELLED | OUTPATIENT
Start: 2025-02-16

## 2025-02-14 ASSESSMENT — FIBROSIS 4 INDEX: FIB4 SCORE: 2.17

## 2025-02-14 ASSESSMENT — PAIN SCALES - GENERAL: PAINLEVEL_OUTOF10: NO PAIN

## 2025-02-14 NOTE — ADDENDUM NOTE
Encounter addended by: Ilda JEAN BAPTISTE M.D. on: 2/14/2025 1:14 PM   Actions taken: Order list changed, Diagnosis association updated, Clinical Note Signed

## 2025-02-14 NOTE — PROCEDURES
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 on 2/14/2025  Histopathologic type: Small cell carcinoma, NOS  Stage prefix: Initial diagnosis  Para-aortic status: Positive  Para-aortic zackary method of assessment: PET  Histologic grade (G): G3  Histologic grading system: 3 grade system  P16 overexpression: Positive       PROCEDURE: Placement of Gold fiducial marker    Patient placed in stirrups and speculum exam performed to visualize cervix/vaginal cuff.  One pack of three preloaded gold fiducial markers was opened. Three preloaded gold marker needles were modified with sterile technique to create flange 0.5cm below tip with sterile micro pore tape that was wrapped around needle multiple time.    Needle inserted into cervix/cuff right, middle, and left side with entry controlled by flange.  Gold markers inserted uneventfully.

## 2025-02-14 NOTE — ADDENDUM NOTE
Encounter addended by: Ilda JEAN BAPTISTE M.D. on: 2/14/2025 3:01 PM   Actions taken: Visit diagnoses modified, Problem List modified, Order list changed, Therapy plan modified

## 2025-02-14 NOTE — CT SIMULATION
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 on 2/14/2025  Histopathologic type: Small cell carcinoma, NOS  Stage prefix: Initial diagnosis  Para-aortic status: Positive  Para-aortic zackary method of assessment: PET  Histologic grade (G): G3  Histologic grading system: 3 grade system  P16 overexpression: Positive         Treatment Planning CT Simulation        Order Questions       Question Answer Comment    Is this for a new course of treatment? Yes     Is this an Addendum? No     Implanted Device/Pacemaker No     Simulation Status Initial     Planned Start Date 3/3/2025     Treatment Site Cervix     Laterality Midline     Treatment Technique IMRT     Other Technique(s) HDR     Treatment Pattern/Frequency Daily     Number of fractions: 25 EBRT, 5 HDR     Concurrent Chemotherapy Yes     Ordering Provider CLAUDIO NEGRON     CT Technique 3D     Slice Thickness 2mm     Scan Extent Abdomen add inguinals     Pelvis     Contrast IV      Small Bowel     IV Contrast Volume Other     Volume (cc) 100     Bowel Preparation No     Treatment Device(s) Vac Chapis slight frog legged     Wing Board     Patient Attire Gown     Patient Position Supine     Patient Orientation Head First     Arm Position Up     Bladder Full     Treatment Machine No preference     Treatment Image Guidance CBCT     Frequency (CBCT) Daily     Image Guidance Match Bone     Treatment Planning Image Fusion CT/PET from 10/24    Special Physics Consult Brachytherapy     Other Orders Special Tx Procedure      Weekly Physics Check     Brachytherapy Type T&O TBD     Interstitial     Brachytherapy Ancillary Schedule SDS      Schedule MRI Pelvis with     Release to patient Immediate

## 2025-02-14 NOTE — PROGRESS NOTES
"Patient was seen today in clinic with Dr. Diaz for consult.  Vitals signs and weight were obtained and pain assessment was completed.  Allergies and medications were reviewed with the patient.       Vitals/Pain:  Vitals:    02/14/25 1006   BP: (!) 153/84   BP Location: Right arm   Patient Position: Sitting   Pulse: 98   Resp: 14   Temp: 36.3 °C (97.3 °F)   SpO2: 97%   Weight: 91.1 kg (200 lb 13.4 oz)   Height: 1.549 m (5' 1\")   Pain Score: No pain        Allergies:   Morphine    Current Medications:  Current Outpatient Medications   Medication Sig Dispense Refill    acetaminophen (TYLENOL) 500 MG Tab Take 500-1,000 mg by mouth every 6 hours as needed.      ibuprofen (MOTRIN) 200 MG Tab Take 400 mg by mouth every 6 hours as needed.      ondansetron (ZOFRAN ODT) 4 MG TABLET DISPERSIBLE Take 4 mg by mouth every 6 hours as needed for Nausea/Vomiting.      dexamethasone (DECADRON) 4 MG Tab Take 4 mg by mouth 2 times a day as needed. For nausea from chemo      traMADol (ULTRAM) 50 MG Tab Take 1 tablet by mouth every 6 hours as needed for pain. (Patient not taking: Reported on 2/14/2025) 28 Tablet 0     No current facility-administered medications for this encounter.         PCP:  Marquis Johnson R.N.  "

## 2025-02-14 NOTE — CONSULTS
RADIATION ONCOLOGY CONSULT  Patient name:  Diane Barrett    Primary Physician:  Brendan Cho M.D. MRN: [id number]  CSN: [id number]   Referring physician:  Makenna Allison  : 1955, 69 y.o.       DATE OF SERVICE: 2025    IDENTIFICATION: A 69 y.o. female with  Visit Diagnoses     ICD-10-CM   1. 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 on 2025  Histopathologic type: Small cell carcinoma, NOS  Stage prefix: Initial diagnosis  Para-aortic status: Positive  Para-aortic zackary method of assessment: PET  Histologic grade (G): G3  Histologic grading system: 3 grade system  P16 overexpression: Positive      She is here at the kind request of Makenna Rolle     HISTORY OF PRESENT ILLNESS:   Subjective     69-year-old  Ab1 female LMP at age 50.  Former smoker quit in .  Never drank alcohol.    She presents with postmenopausal bleeding in 2020 for approximately 1 week duration.    Imaging demonstrated a large 8 x 4 x 5 cm pelvic mass.    2024 Dr. 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    10/7/2024 PET/CT demonstrated 15.8 cm intensely hypermetabolic uterine mass consistent with malignancy, smaller 4 cm hypermetabolic retroperitoneal and pelvic lymph nodes noted.  Very small intensely hypermetabolic left inguinal lymph node.    Started platinum etoposide chemotherapy 10/30/2024.  Completed 3 cycles and follow-up PET 2024 demonstrated marked decrease in uterine mass measuring 3.3 cm from 15.8 cm.  Resolution of small mass upper right uterine fundus.  Decrease in enlarged retroperitoneal and pelvic adenopathy.  Resolution of hypermetabolic left  "inguinal lymph node.    Completed 5 cycles systemic therapy on 2025.  Had's fairly significant anemia and was felt not to tolerate an additional 6 cycle.    Referred for chemoradiation followed by brachytherapy.  Plan is for additional outback 4 cycles carbo Taxol chemotherapy.    Currently she reports no complaints.  Resolution of vaginal bleeding after first cycle of cis etoposide.        PAST MEDICAL HISTORY:   Past Medical History:   Diagnosis Date    Anesthesia     PONV    Anxiety     Arrhythmia     \"extra heart beat  but has never been a problem\"    Cancer (HCC)     rare cervical (neroendocrio)-getting chemo    Pain     hand    PONV (postoperative nausea and vomiting)     nausea    Snoring     Thalassemia minor        PAST SURGICAL HISTORY:  Past Surgical History:   Procedure Laterality Date    KNEE ARTHROPLASTY TOTAL Left 10/15/2018    Procedure: LEFT TOTAL KNEE REPLACEMENT;  Surgeon: Vu Iverson M.D.;  Location: SURGERY [facility];  Service: Orthopedics    DUPUYTREN CONTRACTURE RELEASE  2012    Performed by Shyam Orr M.D. at SURGERY [facility]    FULL THICKNESS SKIN GRAFT  2012    Performed by Shyam Orr M.D. at [facility]    IRRIGATION & DEBRIDEMENT ORTHO  2012    Performed by SHYAM ORR at [facility]    FINGER OR HAND INCISION AND DRAINAGE  2012    Performed by SHYAM ORR at SURGERY [facility]    LACERATION REPAIR  2012    Performed by SHYAM ORR at SURGERY [facility]    CARPAL TUNNEL RELEASE  2012    Performed by SHYAM ORR at SURGERY [facility]    MUSCLE REPAIR  2012    Performed by SHYAM ORR at SURGERY [facility]    HAND SURGERY  2012    left hand trauma    TUBAL LIGATION      PRIMARY C SECTION          KNEE ARTHROSCOPY Left     years prior to replacement    TONSILLECTOMY      as child       CURRENT MEDICATIONS:  Current Outpatient " Medications   Medication Sig Dispense Refill    acetaminophen (TYLENOL) 500 MG Tab Take 500-1,000 mg by mouth every 6 hours as needed.      ibuprofen (MOTRIN) 200 MG Tab Take 400 mg by mouth every 6 hours as needed.      ondansetron (ZOFRAN ODT) 4 MG TABLET DISPERSIBLE Take 4 mg by mouth every 6 hours as needed for Nausea/Vomiting.      dexamethasone (DECADRON) 4 MG Tab Take 4 mg by mouth 2 times a day as needed. For nausea from chemo      traMADol (ULTRAM) 50 MG Tab Take 1 tablet by mouth every 6 hours as needed for pain. (Patient not taking: Reported on 2/14/2025) 28 Tablet 0     No current facility-administered medications for this encounter.       ALLERGIES:    Morphine    FAMILY HISTORY:    family history includes Breast Cancer in her sister; Cancer in her mother, sister, and another family member; Colorectal Cancer in her mother; Diabetes in her brother; Heart Attack in her brother and brother; Heart Disease in an other family member; Hyperlipidemia in her brother and brother; Hypertension in her brother, brother, sister, and another family member; Stroke in an other family member.    SOCIAL HISTORY:     reports that she quit smoking about 43 years ago. Her smoking use included cigarettes. She started smoking about 48 years ago. She has a 5 pack-year smoking history. She has never used smokeless tobacco. She reports that she does not drink alcohol and does not use drugs.  Patient lives in [place] with her spouse Jalil. She is self-employed as a .    REVIEW OF SYSTEMS: Complete review of systems taken.  Pertinent items in HPI.  All others negative.    PAIN ASSESSMENT:      2/14/2025    10:06 AM   Pain Assessment   Pain Score NO PAIN         PHYSICAL EXAM:   PERFORMANCE STATUS:      2/14/2025    10:20 AM   Karnofsky Score   Karnofsky Score 80         2/14/2025    10:19 AM   ECOG Performance Review   ECOG Performance Status Ambulatory and capable of all selfcare but unable to carry out any work  "activities.  Up and about more than 50% of waking hours     BP (!) 153/84 (BP Location: Right arm, Patient Position: Sitting)   Pulse 98   Temp 36.3 °C (97.3 °F)   Resp 14   Ht 1.549 m (5' 1\")   Wt 91.1 kg (200 lb 13.4 oz)   SpO2 97%   BMI 37.95 kg/m²   Physical Exam  Vitals and nursing note reviewed. Exam conducted with a chaperone present.   Constitutional:       General: She is not in acute distress.     Appearance: She is well-developed.   HENT:      Head: Normocephalic.   Eyes:      Conjunctiva/sclera: Conjunctivae normal.      Pupils: Pupils are equal, round, and reactive to light.   Cardiovascular:      Rate and Rhythm: Normal rate and regular rhythm.      Heart sounds: Normal heart sounds.   Pulmonary:      Effort: Pulmonary effort is normal.      Breath sounds: Normal breath sounds.   Abdominal:      General: Bowel sounds are normal.      Palpations: Abdomen is soft.   Genitourinary:     Exam position: Lithotomy position.      Comments: Normal-appearing cervix.  No evidence of parametrial induration.  3 gold fiducial markers inserted in the cervix to aid in radiation field placement and brachytherapy.  Musculoskeletal:         General: No tenderness or deformity. Normal range of motion.      Cervical back: Normal range of motion and neck supple.   Lymphadenopathy:      Cervical: No cervical adenopathy.   Skin:     General: Skin is warm and dry.      Findings: No erythema.   Neurological:      Mental Status: She is alert and oriented to person, place, and time.      Cranial Nerves: No cranial nerve deficit.      Coordination: Coordination normal.   Psychiatric:         Behavior: Behavior normal.         Thought Content: Thought content normal.         Judgment: Judgment normal.              LABORATORY DATA:  Lab Results   Component Value Date/Time    WBC 3.6 (L) 01/31/2025 11:13 AM    RBC 2.20 (L) 01/31/2025 11:13 AM    HEMOGLOBIN 5.7 (LL) 01/31/2025 11:13 AM    HEMATOCRIT 18.9 (L) 01/31/2025 11:13 AM "    MCV 85.9 01/31/2025 11:13 AM    MCH 25.9 (L) 01/31/2025 11:13 AM    MCHC 30.2 (L) 01/31/2025 11:13 AM    RDW 75.2 (H) 01/31/2025 11:13 AM    PLATELETCT 194 01/31/2025 11:13 AM    MPV 11.0 01/31/2025 11:13 AM    NEUTSPOLYS 53.40 01/31/2025 11:13 AM    LYMPHOCYTES 35.40 01/31/2025 11:13 AM    MONOCYTES 10.90 01/31/2025 11:13 AM    EOSINOPHILS 0.00 01/31/2025 11:13 AM    BASOPHILS 0.00 01/31/2025 11:13 AM    HYPOCHROMIA 1+ 01/27/2025 12:15 PM    ANISOCYTOSIS 2+ (A) 01/27/2025 12:15 PM      Lab Results   Component Value Date/Time    SODIUM 139 01/27/2025 12:15 PM    POTASSIUM 4.2 01/27/2025 12:15 PM    CHLORIDE 100 01/27/2025 12:15 PM    CO2 24 01/27/2025 12:15 PM    GLUCOSE 134 (H) 01/27/2025 12:15 PM    BUN 10 01/27/2025 12:15 PM    CREATININE 0.88 01/27/2025 12:15 PM         RADIOLOGY DATA:  No results found.    IMPRESSION:    A 69 y.o. with  Visit Diagnoses     ICD-10-CM   1. 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  She is an excellent response to induction cisplatin etoposide chemotherapy with significant reduction in overall volume of disease.    I did recommend chemoradiation consisting of weekly cisplatin with IMRT based external beam radiotherapy for 5 weeks followed by intracavitary versus intracavitary/interstitial brachytherapy depending on response.     The technical  aspects benefits risks associated with radiotherapy were reviewed with her and her .  They understand and would like to proceed.  She will return for simulation on February 20 with treatment anticipated to start concurrently with chemotherapy on March 3, 2025.  Tentative start date for brachytherapy April 8, 2025.    Thank you for the opportunity to participate in her care.  If any questions or comments, please do not hesitate in calling.    Ilda JEAN BAPTISTE M.D.  Electronically signed by: Ilda JEAN BAPTISTE M.D., 2/14/2025 1:10 PM  [phone]      Orders Placed This Encounter    MR-PELVIS-WITH & W/O AND SEQUENCES    MR-PELVIS-WITH & W/O AND SEQUENCES    SIGNATERA    CBC WITH DIFFERENTIAL    Comp Metabolic Panel    REFERRAL TO ONCOLOGY NURSE NAVIGATOR

## 2025-02-16 ENCOUNTER — HOSPITAL ENCOUNTER (OUTPATIENT)
Dept: RADIOLOGY | Facility: MEDICAL CENTER | Age: 70
End: 2025-02-16
Attending: RADIOLOGY
Payer: MEDICARE

## 2025-02-16 DIAGNOSIS — C53.8 MALIGNANT NEOPLASM OVERLAPPING CERVIX UTERI SITE (HCC): ICD-10-CM

## 2025-02-16 PROCEDURE — A9579 GAD-BASE MR CONTRAST NOS,1ML: HCPCS | Mod: JZ | Performed by: RADIOLOGY

## 2025-02-16 PROCEDURE — 72197 MRI PELVIS W/O & W/DYE: CPT

## 2025-02-16 PROCEDURE — 700117 HCHG RX CONTRAST REV CODE 255: Mod: JZ | Performed by: RADIOLOGY

## 2025-02-16 RX ADMIN — GADOTERIDOL 18 ML: 279.3 INJECTION, SOLUTION INTRAVENOUS at 09:01

## 2025-02-19 ENCOUNTER — OUTPATIENT INFUSION SERVICES (OUTPATIENT)
Dept: ONCOLOGY | Facility: MEDICAL CENTER | Age: 70
End: 2025-02-19
Attending: RADIOLOGY
Payer: MEDICARE

## 2025-02-19 ENCOUNTER — APPOINTMENT (OUTPATIENT)
Dept: ONCOLOGY | Facility: MEDICAL CENTER | Age: 70
End: 2025-02-19
Payer: MEDICARE

## 2025-02-19 VITALS
WEIGHT: 197.97 LBS | SYSTOLIC BLOOD PRESSURE: 150 MMHG | RESPIRATION RATE: 18 BRPM | BODY MASS INDEX: 37.38 KG/M2 | HEIGHT: 61 IN | OXYGEN SATURATION: 92 % | DIASTOLIC BLOOD PRESSURE: 79 MMHG | HEART RATE: 97 BPM | TEMPERATURE: 97.6 F

## 2025-02-19 DIAGNOSIS — D64.81 ANEMIA ASSOCIATED WITH CHEMOTHERAPY: ICD-10-CM

## 2025-02-19 DIAGNOSIS — C53.8 MALIGNANT NEOPLASM OVERLAPPING CERVIX UTERI SITE (HCC): ICD-10-CM

## 2025-02-19 DIAGNOSIS — T45.1X5A ANEMIA ASSOCIATED WITH CHEMOTHERAPY: ICD-10-CM

## 2025-02-19 LAB
ABO GROUP BLD: NORMAL
BARCODED ABORH UBTYP: 6200
BARCODED ABORH UBTYP: 6200
BARCODED PRD CODE UBPRD: NORMAL
BARCODED PRD CODE UBPRD: NORMAL
BARCODED UNIT NUM UBUNT: NORMAL
BARCODED UNIT NUM UBUNT: NORMAL
BLD GP AB SCN SERPL QL: NORMAL
COMPONENT R 8504R: NORMAL
COMPONENT R 8504R: NORMAL
PRODUCT TYPE UPROD: NORMAL
PRODUCT TYPE UPROD: NORMAL
RH BLD: NORMAL
UNIT STATUS USTAT: NORMAL
UNIT STATUS USTAT: NORMAL

## 2025-02-19 PROCEDURE — 86850 RBC ANTIBODY SCREEN: CPT

## 2025-02-19 PROCEDURE — 86923 COMPATIBILITY TEST ELECTRIC: CPT

## 2025-02-19 PROCEDURE — P9016 RBC LEUKOCYTES REDUCED: HCPCS | Mod: 91

## 2025-02-19 PROCEDURE — A4212 NON CORING NEEDLE OR STYLET: HCPCS

## 2025-02-19 PROCEDURE — 306780 HCHG STAT FOR TRANSFUSION PER CASE

## 2025-02-19 PROCEDURE — 86900 BLOOD TYPING SEROLOGIC ABO: CPT

## 2025-02-19 PROCEDURE — 86901 BLOOD TYPING SEROLOGIC RH(D): CPT

## 2025-02-19 PROCEDURE — 36430 TRANSFUSION BLD/BLD COMPNT: CPT

## 2025-02-19 RX ORDER — SODIUM CHLORIDE 9 MG/ML
INJECTION, SOLUTION INTRAVENOUS CONTINUOUS
Status: CANCELLED | OUTPATIENT
Start: 2025-02-19

## 2025-02-19 RX ORDER — HYDROCORTISONE SODIUM SUCCINATE 100 MG/2ML
100 INJECTION INTRAMUSCULAR; INTRAVENOUS PRN
Status: CANCELLED | OUTPATIENT
Start: 2025-02-19

## 2025-02-19 RX ORDER — DIPHENHYDRAMINE HCL 25 MG
25 TABLET ORAL ONCE
Status: DISCONTINUED | OUTPATIENT
Start: 2025-02-19 | End: 2025-02-19 | Stop reason: HOSPADM

## 2025-02-19 RX ORDER — 0.9 % SODIUM CHLORIDE 0.9 %
10 VIAL (ML) INJECTION PRN
Status: CANCELLED | OUTPATIENT
Start: 2025-02-19

## 2025-02-19 RX ORDER — ACETAMINOPHEN 325 MG/1
650 TABLET ORAL PRN
Status: CANCELLED | OUTPATIENT
Start: 2025-02-19

## 2025-02-19 RX ORDER — HYDROCORTISONE SODIUM SUCCINATE 100 MG/2ML
100 INJECTION INTRAMUSCULAR; INTRAVENOUS ONCE
Status: DISCONTINUED | OUTPATIENT
Start: 2025-02-19 | End: 2025-02-19 | Stop reason: HOSPADM

## 2025-02-19 RX ORDER — 0.9 % SODIUM CHLORIDE 0.9 %
3 VIAL (ML) INJECTION PRN
Status: CANCELLED | OUTPATIENT
Start: 2025-02-19

## 2025-02-19 RX ORDER — DIPHENHYDRAMINE HCL 25 MG
25 TABLET ORAL ONCE
Status: CANCELLED | OUTPATIENT
Start: 2025-02-19 | End: 2025-02-19

## 2025-02-19 RX ORDER — 0.9 % SODIUM CHLORIDE 0.9 %
VIAL (ML) INJECTION PRN
Status: CANCELLED | OUTPATIENT
Start: 2025-02-19

## 2025-02-19 RX ORDER — ACETAMINOPHEN 325 MG/1
650 TABLET ORAL ONCE
Status: DISCONTINUED | OUTPATIENT
Start: 2025-02-19 | End: 2025-02-19 | Stop reason: HOSPADM

## 2025-02-19 RX ORDER — ACETAMINOPHEN 325 MG/1
650 TABLET ORAL ONCE
Status: CANCELLED | OUTPATIENT
Start: 2025-02-19

## 2025-02-19 RX ORDER — HYDROCORTISONE SODIUM SUCCINATE 100 MG/2ML
100 INJECTION INTRAMUSCULAR; INTRAVENOUS ONCE
Status: CANCELLED | OUTPATIENT
Start: 2025-02-19 | End: 2025-02-19

## 2025-02-19 RX ORDER — DIPHENHYDRAMINE HYDROCHLORIDE 50 MG/ML
25 INJECTION INTRAMUSCULAR; INTRAVENOUS PRN
Status: CANCELLED | OUTPATIENT
Start: 2025-02-19

## 2025-02-19 ASSESSMENT — FIBROSIS 4 INDEX: FIB4 SCORE: 6.78

## 2025-02-20 ENCOUNTER — HOSPITAL ENCOUNTER (OUTPATIENT)
Dept: RADIATION ONCOLOGY | Facility: MEDICAL CENTER | Age: 70
End: 2025-02-20
Payer: MEDICARE

## 2025-02-20 ENCOUNTER — APPOINTMENT (OUTPATIENT)
Dept: ONCOLOGY | Facility: MEDICAL CENTER | Age: 70
End: 2025-02-20
Payer: MEDICARE

## 2025-02-20 ENCOUNTER — HOSPITAL ENCOUNTER (OUTPATIENT)
Dept: RADIATION ONCOLOGY | Facility: MEDICAL CENTER | Age: 70
End: 2025-02-20
Attending: RADIOLOGY
Payer: MEDICARE

## 2025-02-20 PROCEDURE — 77290 THER RAD SIMULAJ FIELD CPLX: CPT | Performed by: RADIOLOGY

## 2025-02-20 PROCEDURE — 77470 SPECIAL RADIATION TREATMENT: CPT | Performed by: RADIOLOGY

## 2025-02-20 PROCEDURE — 77334 RADIATION TREATMENT AID(S): CPT | Performed by: RADIOLOGY

## 2025-02-20 NOTE — PROGRESS NOTES
Patient arrived to [facility] for 2 units of RBC. Port accessed using sterile technique, port flushed with positive blood return noted, COD blood draw sent. Hgb 7.2 on 2/14/25, blood consent signed, 2 units RBC transfused per order, well tolerated. Port flushed and de-accessed, gauze and tape applied. Next appointment confirmed, patient left home in stable condition.

## 2025-02-20 NOTE — RADIATION PLANNING NOTES
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 on 2/14/2025  Histopathologic type: Small cell carcinoma, NOS  Stage prefix: Initial diagnosis  Para-aortic status: Positive  Para-aortic zackary method of assessment: PET  Histologic grade (G): G3  Histologic grading system: 3 grade system  P16 overexpression: Positive         IMAGING REVIEWED:  [x] CT     [x] MRI     [x] PET/CT     [] BONE SCAN     [] MAMMO     [] OTHER      TREATMENT INTENT:   [x] CURATIVE     [] MAINTENANCE     []  PALLIATIVE      []  SUPPORTIVE     []  PROPHYLACTIC     [] BENIGN     []  CONSOLIDATIVE      [] DEFINITIVE   []  OLOGIMETASTATIC      LINE OF TREATMENT:  [] ADJUVANT   [x] DEFINITIVE   [] NEOADJUVANT   [] RE-TREATMENT      TECHNIQUE PLANNED:  [x] IMRT   [] 3D   [] SBRT   [] SRS/SRT   [] HDR   [] ELECTRON       IMRT JUSTIFICATION:  []   An immediately adjacent area has been previously irradiated and abutting portals must be established with high precision.    [x]  Dose escalation is planned to deliver radiation doses in excess of those commonly utilized for similar tumors with conventional treatment.    []  The target volume is concave or convex, and the critical normal tissues are within or around that convexity or concavity.    [x]  The target volume is in close proximity to critical structures that must be protected.    [x]  The volume of interest must be covered with narrow margins to adequately protect  immediately adjacent structures.      FIELDS & BLOCKING:  [] COMPLEX BLOCKS     []  = 3 TX AREAS     [x]  ARCS     []  CUSTOM SHEILD        []  SIMPLE BLOCK      CHEMOTHERAPY:  [x]  CONCURRENT     []  INDUCTION     [] SEQUENTIAL     []  <30 DAYS FROM XRT      NOTES:  OAR CONSTRAINTS: (GUIDELINES ONLY NOT  Use the laxative twice daily for until completely cleaned out.  Also start the pantoprazole daily and use this for one month to help calm down the stomach acid and reflux problem.    ABSOLUTE)   Target Prescribed Coverage   PTV 95% of PTV covered by 100% (cGy) of RX Dose     PTV 99% of PTV covered by 93% (cGy) of RX Dose       THA Goal   Rectum/Sigmoid V40Gy < 60%   Bladder V45Gy < 35%   R Femoral Head V30Gy < 15%   L Femoral Head V30Gy < 15%   Bowel (small & large) V40Gy < 30%   Individual Small Bowel Loops V15Gy < 120cc   Entire Cavity Small Bowel V45Gy < 195cc   *RTOG 0921, QUANTEC

## 2025-02-20 NOTE — RADIATION PLANNING NOTES
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 on 2/14/2025  Histopathologic type: Small cell carcinoma, NOS  Stage prefix: Initial diagnosis  Para-aortic status: Positive  Para-aortic zackary method of assessment: PET  Histologic grade (G): G3  Histologic grading system: 3 grade system  P16 overexpression: Positive       DATE OF SERVICE: 2/20/2025    TYPE OF SIMULATION: Pelvis    GOAL OF TREATMENT:   [x] Curative  [] Palliative  [] Oligometastatic    CONTRAST:    [x] IV Contrast*  [x] Small Bowel  [] Rectal  [] Urethral              POSITION:    [x]  Supine  [] Prone with belly board    COMPLEX:  [] Complex Blocking   [x]Arcs  [] Custom Blocks  [] >3 Sites    PROCEDURE: Patient positioned on CT table in Vac-Chapis immobilization device with or without belly board depending on position. CT acquired thorough the entire volume of interest.  Images reviewed and exported to treatment planning system.    I have personally reviewed the relevant data, performed the target localization, and determined all relevant factors for this patient’s simulation.    *Omnipaque 80 -100cc IVP in conjunction with 500cc NS

## 2025-02-20 NOTE — RADIATION PLANNING NOTES
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 on 2/14/2025  Histopathologic type: Small cell carcinoma, NOS  Stage prefix: Initial diagnosis  Para-aortic status: Positive  Para-aortic zackary method of assessment: PET  Histologic grade (G): G3  Histologic grading system: 3 grade system  P16 overexpression: Positive       SPECIAL TREATMENT PROCEDURE NOTE:  Considerable additional effort required in the management of this case because of administration of concurrent cisplatin chemotherapy which may result in increased normal tissue toxicity. This includes longer and possibly more frequent on treatment visits.

## 2025-02-21 ENCOUNTER — APPOINTMENT (OUTPATIENT)
Dept: ONCOLOGY | Facility: MEDICAL CENTER | Age: 70
End: 2025-02-21
Payer: MEDICARE

## 2025-02-21 ENCOUNTER — PATIENT OUTREACH (OUTPATIENT)
Dept: ONCOLOGY | Facility: MEDICAL CENTER | Age: 70
End: 2025-02-21
Payer: MEDICARE

## 2025-02-21 RX ORDER — 0.9 % SODIUM CHLORIDE 0.9 %
10 VIAL (ML) INJECTION PRN
OUTPATIENT
Start: 2025-03-02

## 2025-02-21 RX ORDER — PALONOSETRON 0.05 MG/ML
0.25 INJECTION, SOLUTION INTRAVENOUS ONCE
OUTPATIENT
Start: 2025-03-03 | End: 2025-03-03

## 2025-02-21 RX ORDER — 0.9 % SODIUM CHLORIDE 0.9 %
10 VIAL (ML) INJECTION PRN
OUTPATIENT
Start: 2025-03-03

## 2025-02-21 RX ORDER — 0.9 % SODIUM CHLORIDE 0.9 %
VIAL (ML) INJECTION PRN
OUTPATIENT
Start: 2025-03-10

## 2025-02-21 RX ORDER — SODIUM CHLORIDE 9 MG/ML
INJECTION, SOLUTION INTRAVENOUS CONTINUOUS
OUTPATIENT
Start: 2025-03-10

## 2025-02-21 RX ORDER — METHYLPREDNISOLONE SODIUM SUCCINATE 125 MG/2ML
125 INJECTION, POWDER, LYOPHILIZED, FOR SOLUTION INTRAMUSCULAR; INTRAVENOUS PRN
OUTPATIENT
Start: 2025-03-10

## 2025-02-21 RX ORDER — ONDANSETRON 8 MG/1
8 TABLET, ORALLY DISINTEGRATING ORAL PRN
OUTPATIENT
Start: 2025-03-03

## 2025-02-21 RX ORDER — PROCHLORPERAZINE MALEATE 10 MG
10 TABLET ORAL EVERY 6 HOURS PRN
OUTPATIENT
Start: 2025-03-10

## 2025-02-21 RX ORDER — 0.9 % SODIUM CHLORIDE 0.9 %
10 VIAL (ML) INJECTION PRN
OUTPATIENT
Start: 2025-03-10

## 2025-02-21 RX ORDER — PROCHLORPERAZINE MALEATE 10 MG
10 TABLET ORAL EVERY 6 HOURS PRN
OUTPATIENT
Start: 2025-03-03

## 2025-02-21 RX ORDER — 0.9 % SODIUM CHLORIDE 0.9 %
3 VIAL (ML) INJECTION PRN
OUTPATIENT
Start: 2025-03-09

## 2025-02-21 RX ORDER — EPINEPHRINE 1 MG/ML(1)
0.5 AMPUL (ML) INJECTION PRN
OUTPATIENT
Start: 2025-03-10

## 2025-02-21 RX ORDER — 0.9 % SODIUM CHLORIDE 0.9 %
10 VIAL (ML) INJECTION PRN
OUTPATIENT
Start: 2025-03-09

## 2025-02-21 RX ORDER — DIPHENHYDRAMINE HYDROCHLORIDE 50 MG/ML
50 INJECTION INTRAMUSCULAR; INTRAVENOUS PRN
OUTPATIENT
Start: 2025-03-10

## 2025-02-21 RX ORDER — ONDANSETRON 8 MG/1
8 TABLET, ORALLY DISINTEGRATING ORAL PRN
OUTPATIENT
Start: 2025-03-10

## 2025-02-21 RX ORDER — ONDANSETRON 2 MG/ML
4 INJECTION INTRAMUSCULAR; INTRAVENOUS PRN
OUTPATIENT
Start: 2025-03-03

## 2025-02-21 RX ORDER — 0.9 % SODIUM CHLORIDE 0.9 %
3 VIAL (ML) INJECTION PRN
OUTPATIENT
Start: 2025-03-02

## 2025-02-21 RX ORDER — ONDANSETRON 2 MG/ML
4 INJECTION INTRAMUSCULAR; INTRAVENOUS PRN
OUTPATIENT
Start: 2025-03-10

## 2025-02-21 RX ORDER — DEXAMETHASONE SODIUM PHOSPHATE 4 MG/ML
12 INJECTION, SOLUTION INTRA-ARTICULAR; INTRALESIONAL; INTRAMUSCULAR; INTRAVENOUS; SOFT TISSUE ONCE
OUTPATIENT
Start: 2025-03-10 | End: 2025-03-10

## 2025-02-21 RX ORDER — 0.9 % SODIUM CHLORIDE 0.9 %
3 VIAL (ML) INJECTION PRN
OUTPATIENT
Start: 2025-03-03

## 2025-02-21 RX ORDER — 0.9 % SODIUM CHLORIDE 0.9 %
VIAL (ML) INJECTION PRN
OUTPATIENT
Start: 2025-03-02

## 2025-02-21 RX ORDER — DIPHENHYDRAMINE HYDROCHLORIDE 50 MG/ML
50 INJECTION INTRAMUSCULAR; INTRAVENOUS PRN
OUTPATIENT
Start: 2025-03-03

## 2025-02-21 RX ORDER — DEXAMETHASONE SODIUM PHOSPHATE 4 MG/ML
12 INJECTION, SOLUTION INTRA-ARTICULAR; INTRALESIONAL; INTRAMUSCULAR; INTRAVENOUS; SOFT TISSUE ONCE
OUTPATIENT
Start: 2025-03-03 | End: 2025-03-03

## 2025-02-21 RX ORDER — 0.9 % SODIUM CHLORIDE 0.9 %
VIAL (ML) INJECTION PRN
OUTPATIENT
Start: 2025-03-09

## 2025-02-21 RX ORDER — METHYLPREDNISOLONE SODIUM SUCCINATE 125 MG/2ML
125 INJECTION, POWDER, LYOPHILIZED, FOR SOLUTION INTRAMUSCULAR; INTRAVENOUS PRN
OUTPATIENT
Start: 2025-03-03

## 2025-02-21 RX ORDER — 0.9 % SODIUM CHLORIDE 0.9 %
3 VIAL (ML) INJECTION PRN
OUTPATIENT
Start: 2025-03-10

## 2025-02-21 RX ORDER — PALONOSETRON 0.05 MG/ML
0.25 INJECTION, SOLUTION INTRAVENOUS ONCE
OUTPATIENT
Start: 2025-03-10 | End: 2025-03-10

## 2025-02-21 RX ORDER — EPINEPHRINE 1 MG/ML(1)
0.5 AMPUL (ML) INJECTION PRN
OUTPATIENT
Start: 2025-03-03

## 2025-02-21 RX ORDER — SODIUM CHLORIDE 9 MG/ML
500 INJECTION, SOLUTION INTRAVENOUS ONCE
OUTPATIENT
Start: 2025-03-10

## 2025-02-21 RX ORDER — SODIUM CHLORIDE 9 MG/ML
500 INJECTION, SOLUTION INTRAVENOUS ONCE
OUTPATIENT
Start: 2025-03-03

## 2025-02-21 RX ORDER — SODIUM CHLORIDE 9 MG/ML
INJECTION, SOLUTION INTRAVENOUS CONTINUOUS
OUTPATIENT
Start: 2025-03-03

## 2025-02-21 RX ORDER — 0.9 % SODIUM CHLORIDE 0.9 %
VIAL (ML) INJECTION PRN
OUTPATIENT
Start: 2025-03-03

## 2025-02-21 NOTE — PROGRESS NOTES
On February 21, 2025, Oncology Social Worker Jane Song processed [facility] [employer] [facility].  Pt. will be covered for a total of 7 nights.      [id number]  [id number]  4/22-4/23/2025-[facility] #[id number] night

## 2025-02-26 ENCOUNTER — PATIENT OUTREACH (OUTPATIENT)
Dept: ONCOLOGY | Facility: MEDICAL CENTER | Age: 70
End: 2025-02-26
Payer: MEDICARE

## 2025-02-26 NOTE — PROGRESS NOTES
Oncology Nurse Navigation  Phoned pt for follow up on distress screening.  Pt states treatment is going well.  She will start concurrent chemo radiation on 3/3/25.  Lodging arranged in April for her brachytherapy.  Pt denies barriers at this time.  Pt agrees to contact ONN should she need additional support or resources.

## 2025-03-03 ENCOUNTER — OUTPATIENT INFUSION SERVICES (OUTPATIENT)
Dept: ONCOLOGY | Facility: MEDICAL CENTER | Age: 70
End: 2025-03-03
Attending: SPECIALIST
Payer: MEDICARE

## 2025-03-03 ENCOUNTER — HOSPITAL ENCOUNTER (OUTPATIENT)
Dept: RADIATION ONCOLOGY | Facility: MEDICAL CENTER | Age: 70
End: 2025-03-03
Attending: RADIOLOGY
Payer: MEDICARE

## 2025-03-03 ENCOUNTER — HOSPITAL ENCOUNTER (OUTPATIENT)
Dept: RADIATION ONCOLOGY | Facility: MEDICAL CENTER | Age: 70
End: 2025-03-03

## 2025-03-03 VITALS
HEIGHT: 61 IN | TEMPERATURE: 97 F | HEART RATE: 128 BPM | WEIGHT: 198.41 LBS | OXYGEN SATURATION: 95 % | BODY MASS INDEX: 37.46 KG/M2 | RESPIRATION RATE: 18 BRPM | DIASTOLIC BLOOD PRESSURE: 90 MMHG | SYSTOLIC BLOOD PRESSURE: 160 MMHG

## 2025-03-03 DIAGNOSIS — C53.8 MALIGNANT NEOPLASM OVERLAPPING CERVIX UTERI SITE (HCC): ICD-10-CM

## 2025-03-03 LAB
ALBUMIN SERPL BCP-MCNC: 4 G/DL (ref 3.2–4.9)
ALBUMIN/GLOB SERPL: 1.3 G/DL
ALP SERPL-CCNC: 45 U/L (ref 30–99)
ALT SERPL-CCNC: 13 U/L (ref 2–50)
ANION GAP SERPL CALC-SCNC: 12 MMOL/L (ref 7–16)
AST SERPL-CCNC: 27 U/L (ref 12–45)
BASOPHILS # BLD AUTO: 0.9 % (ref 0–1.8)
BASOPHILS # BLD: 0.04 K/UL (ref 0–0.12)
BILIRUB SERPL-MCNC: 0.5 MG/DL (ref 0.1–1.5)
BUN SERPL-MCNC: 25 MG/DL (ref 8–22)
CALCIUM ALBUM COR SERPL-MCNC: 10 MG/DL (ref 8.5–10.5)
CALCIUM SERPL-MCNC: 10 MG/DL (ref 8.5–10.5)
CHEMOTHERAPY INFUSION START DATE: NORMAL
CHEMOTHERAPY RECORDS: 2.4 GY
CHEMOTHERAPY RECORDS: 6000 CGY
CHEMOTHERAPY RECORDS: NORMAL
CHEMOTHERAPY RX CANCER: NORMAL
CHLORIDE SERPL-SCNC: 101 MMOL/L (ref 96–112)
CO2 SERPL-SCNC: 26 MMOL/L (ref 20–33)
CREAT SERPL-MCNC: 1.18 MG/DL (ref 0.5–1.4)
DATE 1ST CHEMO CANCER: NORMAL
EOSINOPHIL # BLD AUTO: 0.03 K/UL (ref 0–0.51)
EOSINOPHIL NFR BLD: 0.6 % (ref 0–6.9)
ERYTHROCYTE [DISTWIDTH] IN BLOOD BY AUTOMATED COUNT: 52.1 FL (ref 35.9–50)
GFR SERPLBLD CREATININE-BSD FMLA CKD-EPI: 50 ML/MIN/1.73 M 2
GLOBULIN SER CALC-MCNC: 3.2 G/DL (ref 1.9–3.5)
GLUCOSE SERPL-MCNC: 146 MG/DL (ref 65–99)
HCT VFR BLD AUTO: 29.5 % (ref 37–47)
HGB BLD-MCNC: 9.5 G/DL (ref 12–16)
IMM GRANULOCYTES # BLD AUTO: 0.01 K/UL (ref 0–0.11)
IMM GRANULOCYTES NFR BLD AUTO: 0.2 % (ref 0–0.9)
LYMPHOCYTES # BLD AUTO: 2.19 K/UL (ref 1–4.8)
LYMPHOCYTES NFR BLD: 47 % (ref 22–41)
MAGNESIUM SERPL-MCNC: 1.9 MG/DL (ref 1.5–2.5)
MCH RBC QN AUTO: 26.8 PG (ref 27–33)
MCHC RBC AUTO-ENTMCNC: 32.2 G/DL (ref 32.2–35.5)
MCV RBC AUTO: 83.3 FL (ref 81.4–97.8)
MONOCYTES # BLD AUTO: 0.6 K/UL (ref 0–0.85)
MONOCYTES NFR BLD AUTO: 12.9 % (ref 0–13.4)
NEUTROPHILS # BLD AUTO: 1.79 K/UL (ref 1.82–7.42)
NEUTROPHILS NFR BLD: 38.4 % (ref 44–72)
NRBC # BLD AUTO: 0 K/UL
NRBC BLD-RTO: 0 /100 WBC (ref 0–0.2)
OUTPT INFUS CBC COMMENT OICOM: ABNORMAL
PLATELET # BLD AUTO: 147 K/UL (ref 164–446)
PMV BLD AUTO: 11.1 FL (ref 9–12.9)
POTASSIUM SERPL-SCNC: 4.3 MMOL/L (ref 3.6–5.5)
PROT SERPL-MCNC: 7.2 G/DL (ref 6–8.2)
RAD ONC ARIA COURSE LAST TREATMENT DATE: NORMAL
RAD ONC ARIA COURSE TREATMENT ELAPSED DAYS: NORMAL
RAD ONC ARIA REFERENCE POINT DOSAGE GIVEN TO DATE: 2.4 GY
RAD ONC ARIA REFERENCE POINT ID: NORMAL
RAD ONC ARIA REFERENCE POINT SESSION DOSAGE GIVEN: 2.4 GY
RBC # BLD AUTO: 3.54 M/UL (ref 4.2–5.4)
SODIUM SERPL-SCNC: 139 MMOL/L (ref 135–145)
WBC # BLD AUTO: 4.7 K/UL (ref 4.8–10.8)

## 2025-03-03 PROCEDURE — 96361 HYDRATE IV INFUSION ADD-ON: CPT

## 2025-03-03 PROCEDURE — 96375 TX/PRO/DX INJ NEW DRUG ADDON: CPT

## 2025-03-03 PROCEDURE — 77386 HCHG IMRT DELIVERY COMPLEX: CPT | Performed by: RADIOLOGY

## 2025-03-03 PROCEDURE — 77280 THER RAD SIMULAJ FIELD SMPL: CPT | Performed by: RADIOLOGY

## 2025-03-03 PROCEDURE — 700111 HCHG RX REV CODE 636 W/ 250 OVERRIDE (IP): Mod: JZ | Performed by: STUDENT IN AN ORGANIZED HEALTH CARE EDUCATION/TRAINING PROGRAM

## 2025-03-03 PROCEDURE — 80053 COMPREHEN METABOLIC PANEL: CPT

## 2025-03-03 PROCEDURE — 700105 HCHG RX REV CODE 258: Performed by: STUDENT IN AN ORGANIZED HEALTH CARE EDUCATION/TRAINING PROGRAM

## 2025-03-03 PROCEDURE — 96367 TX/PROPH/DG ADDL SEQ IV INF: CPT

## 2025-03-03 PROCEDURE — 85025 COMPLETE CBC W/AUTO DIFF WBC: CPT

## 2025-03-03 PROCEDURE — 83735 ASSAY OF MAGNESIUM: CPT

## 2025-03-03 PROCEDURE — 96366 THER/PROPH/DIAG IV INF ADDON: CPT

## 2025-03-03 PROCEDURE — 96413 CHEMO IV INFUSION 1 HR: CPT

## 2025-03-03 PROCEDURE — A4212 NON CORING NEEDLE OR STYLET: HCPCS

## 2025-03-03 RX ORDER — PALONOSETRON 0.05 MG/ML
0.25 INJECTION, SOLUTION INTRAVENOUS ONCE
Status: COMPLETED | OUTPATIENT
Start: 2025-03-03 | End: 2025-03-03

## 2025-03-03 RX ORDER — DIPHENHYDRAMINE HYDROCHLORIDE 50 MG/ML
50 INJECTION, SOLUTION INTRAMUSCULAR; INTRAVENOUS PRN
Status: DISCONTINUED | OUTPATIENT
Start: 2025-03-03 | End: 2025-03-03 | Stop reason: HOSPADM

## 2025-03-03 RX ORDER — SODIUM CHLORIDE 9 MG/ML
500 INJECTION, SOLUTION INTRAVENOUS ONCE
Status: COMPLETED | OUTPATIENT
Start: 2025-03-03 | End: 2025-03-03

## 2025-03-03 RX ORDER — EPINEPHRINE 1 MG/ML(1)
0.5 AMPUL (ML) INJECTION PRN
Status: DISCONTINUED | OUTPATIENT
Start: 2025-03-03 | End: 2025-03-03 | Stop reason: HOSPADM

## 2025-03-03 RX ORDER — DEXAMETHASONE SODIUM PHOSPHATE 4 MG/ML
12 INJECTION, SOLUTION INTRA-ARTICULAR; INTRALESIONAL; INTRAMUSCULAR; INTRAVENOUS; SOFT TISSUE ONCE
Status: COMPLETED | OUTPATIENT
Start: 2025-03-03 | End: 2025-03-03

## 2025-03-03 RX ORDER — METHYLPREDNISOLONE SODIUM SUCCINATE 125 MG/2ML
125 INJECTION, POWDER, LYOPHILIZED, FOR SOLUTION INTRAMUSCULAR; INTRAVENOUS PRN
Status: DISCONTINUED | OUTPATIENT
Start: 2025-03-03 | End: 2025-03-03 | Stop reason: HOSPADM

## 2025-03-03 RX ADMIN — DEXAMETHASONE SODIUM PHOSPHATE 12 MG: 4 INJECTION INTRA-ARTICULAR; INTRALESIONAL; INTRAMUSCULAR; INTRAVENOUS; SOFT TISSUE at 10:36

## 2025-03-03 RX ADMIN — CISPLATIN 80 MG: 1 INJECTION, SOLUTION INTRAVENOUS at 11:57

## 2025-03-03 RX ADMIN — SODIUM CHLORIDE 500 ML: 9 INJECTION, SOLUTION INTRAVENOUS at 09:25

## 2025-03-03 RX ADMIN — POTASSIUM CHLORIDE: 2 INJECTION, SOLUTION, CONCENTRATE INTRAVENOUS at 13:43

## 2025-03-03 RX ADMIN — FOSAPREPITANT 150 MG: 150 INJECTION, POWDER, LYOPHILIZED, FOR SOLUTION INTRAVENOUS at 10:40

## 2025-03-03 RX ADMIN — PALONOSETRON HYDROCHLORIDE 0.25 MG: 0.25 INJECTION INTRAVENOUS at 10:33

## 2025-03-03 ASSESSMENT — FIBROSIS 4 INDEX: FIB4 SCORE: 6.78

## 2025-03-03 NOTE — PROCEDURES
DATE OF SERVICE: 3/3/2025    Radiation Therapy Episodes       Active Episodes       Radiation Therapy: IMRT (3/3/2025)                   Radiation Treatments         Plan Last Treated On Elapsed Days Fractions Treated Prescribed Fraction Dose (cGy) Prescribed Total Dose (cGy)    Pelvis 3/3/2025 0 @ 03/03/2025 1 of 25 240 6,000                  Reference Point Last Treated On Elapsed Days Most Recent Session Dose (cGy) Total Dose (cGy)    Pelvis 3/3/2025 0 @ 03/03/2025 240 240                            First Visit Simple Simulation: Called by Truebeam machine to verify treatment parameters including:  treatment site, treatment dose, and treatment setup prior to first treatment. Image derived shifts reviewed in all appropriate planes.  Shifts approved.  Patient treated.    I have personally reviewed the relevant data, performed the target localization, and determined all relevant factors for this patient’s simulation.

## 2025-03-04 ENCOUNTER — HOSPITAL ENCOUNTER (OUTPATIENT)
Dept: RADIATION ONCOLOGY | Facility: MEDICAL CENTER | Age: 70
End: 2025-03-04
Payer: MEDICARE

## 2025-03-04 LAB
CHEMOTHERAPY INFUSION START DATE: NORMAL
CHEMOTHERAPY RECORDS: 2.4 GY
CHEMOTHERAPY RECORDS: 6000 CGY
CHEMOTHERAPY RECORDS: NORMAL
CHEMOTHERAPY RX CANCER: NORMAL
DATE 1ST CHEMO CANCER: NORMAL
RAD ONC ARIA COURSE LAST TREATMENT DATE: NORMAL
RAD ONC ARIA COURSE TREATMENT ELAPSED DAYS: NORMAL
RAD ONC ARIA REFERENCE POINT DOSAGE GIVEN TO DATE: 4.8 GY
RAD ONC ARIA REFERENCE POINT ID: NORMAL
RAD ONC ARIA REFERENCE POINT SESSION DOSAGE GIVEN: 2.4 GY

## 2025-03-04 PROCEDURE — 77014 PR CT GUIDANCE PLACEMENT RAD THERAPY FIELDS: CPT | Mod: 26 | Performed by: RADIOLOGY

## 2025-03-04 PROCEDURE — 77386 HCHG IMRT DELIVERY COMPLEX: CPT | Performed by: RADIOLOGY

## 2025-03-04 NOTE — PROGRESS NOTES
"Pharmacy Chemotherapy Calculation:    Dx: Cervical Cancer, Stage IIIC         Protocol: Weekly CISplatin + XRT      *Dosing Reference*  CISplatin 40 mg/m2 IV over 60 min on Day 1   Repeat every 7 days x 6 cycles with concurrent XRT     NCCNGuidelines® for Cervical Cancer V.3.2025.   Janee HM, et al. N Engl J Med. 1999;340(15):1154-61.  Leann PG, et al. J Clin Oncol. 2007;25(19):2804-10.    Allergies:  Morphine     BP (!) 151/82   Pulse (!) 103   Temp 36 °C (96.8 °F) (Temporal)   Resp 18   Ht 1.54 m (5' 0.63\")   Wt 90.3 kg (199 lb 1.2 oz)   SpO2 97%   BMI 38.08 kg/m²  Body surface area is 1.97 meters squared.    Labs 3/10/25:  ANC~ 3650 Plt = 141k   Hgb = 8.3     SCr = 1.04 mg/dL CrCl ~ 72 mL/min   K = 4  Mag = 1.2         Drug Order   (Drug name, dose, route, IV Fluid & volume, frequency, number of doses) Cycle 2      Previous treatment:C1 3/3/25 s/p 5 cycles Cis/etoposide last 1/29-1/31/25     Medication = CISplatin  Base Dose = 40 mg/m2  Calc Dose: Base Dose x 1.97 m² = 78.8 mg  Final Dose = 80 mg  Route = IV  Fluid & Volume =  mL  Admin Duration = Over 60 min           <10% difference, okay to treat with final dose       By my signature below, I confirm this process was performed independently with the BSA and all final chemotherapy dosing calculations congruent. I have reviewed the above chemotherapy order and that my calculation of the final dose and BSA (when applicable) corroborate those calculations of the  pharmacist. Discrepancies of 10% or greater in the written dose have been addressed and documented within the [facility] Progress notes.    Zayra Padilla, PharmD    "

## 2025-03-05 ENCOUNTER — HOSPITAL ENCOUNTER (OUTPATIENT)
Dept: RADIATION ONCOLOGY | Facility: MEDICAL CENTER | Age: 70
End: 2025-03-05
Payer: MEDICARE

## 2025-03-05 ENCOUNTER — HOSPITAL ENCOUNTER (OUTPATIENT)
Dept: RADIATION ONCOLOGY | Facility: MEDICAL CENTER | Age: 70
End: 2025-03-05
Attending: RADIOLOGY
Payer: MEDICARE

## 2025-03-05 VITALS
WEIGHT: 201.3 LBS | OXYGEN SATURATION: 97 % | BODY MASS INDEX: 38.5 KG/M2 | SYSTOLIC BLOOD PRESSURE: 133 MMHG | DIASTOLIC BLOOD PRESSURE: 82 MMHG | HEART RATE: 80 BPM | TEMPERATURE: 97.3 F

## 2025-03-05 LAB
CHEMOTHERAPY INFUSION START DATE: NORMAL
CHEMOTHERAPY RECORDS: 2.4 GY
CHEMOTHERAPY RECORDS: 6000 CGY
CHEMOTHERAPY RECORDS: NORMAL
CHEMOTHERAPY RX CANCER: NORMAL
DATE 1ST CHEMO CANCER: NORMAL
RAD ONC ARIA COURSE LAST TREATMENT DATE: NORMAL
RAD ONC ARIA COURSE TREATMENT ELAPSED DAYS: NORMAL
RAD ONC ARIA REFERENCE POINT DOSAGE GIVEN TO DATE: 7.2 GY
RAD ONC ARIA REFERENCE POINT ID: NORMAL
RAD ONC ARIA REFERENCE POINT SESSION DOSAGE GIVEN: 2.4 GY

## 2025-03-05 PROCEDURE — 77386 HCHG IMRT DELIVERY COMPLEX: CPT | Performed by: RADIOLOGY

## 2025-03-05 PROCEDURE — 77336 RADIATION PHYSICS CONSULT: CPT | Performed by: RADIOLOGY

## 2025-03-05 PROCEDURE — 77014 PR CT GUIDANCE PLACEMENT RAD THERAPY FIELDS: CPT | Mod: 26 | Performed by: RADIOLOGY

## 2025-03-05 ASSESSMENT — PAIN SCALES - GENERAL: PAINLEVEL_OUTOF10: NO PAIN

## 2025-03-05 ASSESSMENT — FIBROSIS 4 INDEX: FIB4 SCORE: 3.51

## 2025-03-05 NOTE — ON TREATMENT VISIT
"  ON TREATMENT  NOTE  RADIATION ONCOLOGY DEPARTMENT    Patient name:  Diane Barrett    Primary Physician:  Brendan Cho M.D. MRN: [id number]  CSN: [id number]   Referring physician:  Ambrose Silverman M.D.   : 1955, 69 y.o.     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 on 2025  Histopathologic type: Small cell carcinoma, NOS  Stage prefix: Initial diagnosis  Para-aortic status: Positive  Para-aortic zackary method of assessment: PET  Histologic grade (G): G3  Histologic grading system: 3 grade system  P16 overexpression: Positive      TREATMENT SUMMARY:  Course First Treatment Date 2025  Course Last Treatment Date 2025  Radiation Therapy Episodes       Active Episodes       Radiation Therapy: IMRT (3/3/2025)                   Radiation Treatments         Plan Last Treated On Elapsed Days Fractions Treated Prescribed Fraction Dose (cGy) Prescribed Total Dose (cGy)    Pelvis 3/4/2025 1 @ 2025 2 of 25 240 6,000                  Reference Point Last Treated On Elapsed Days Most Recent Session Dose (cGy) Total Dose (cGy)    Pelvis 3/4/2025 1 @ 2025 240 480                               SUBJECTIVE:  Mild low-grade nausea.  Has Zofran and dexamethasone given by Dr. Silverman.    VITAL SIGNS:      3/5/2025     9:16 AM 3/3/2025     9:09 AM 2025     4:43 PM 2025     3:06 PM 2025     2:43 PM 2025     2:26 PM 2025    12:45 PM   Vitals   SYSTOLIC 133 160 150 140 138 143 124   DIASTOLIC 82 90 79 73 84 72 74   Pulse 80 128 97 90 89 92 90   Temperature 36.3 °C (97.3 °F) 36.1 °C (97 °F) 36.4 °C (97.6 °F) 36.3 °C (97.4 °F) 36.3 °C (97.4 °F) 36.5 °C (97.7 °F) 36.3 °C (97.4 °F)   Respiration  18 18 18 18 18 18   Weight 201.3 198.41        Height  1.54 m (5' 0.63\")        BMI 38.5 " kg/m2 37.95 kg/m2        Pulse Oximetry 97 % 95 % 92 % 94 % 94 % 93 % 96 %     KPS: 90, Able to carry on normal activity; minor signs or symptoms of disease (ECOG equivalent 0)  Encounter Vitals  Temperature: 36.3 °C (97.3 °F)  Temp src: Temporal  Blood Pressure : 133/82  Pulse: 80  Pulse Oximetry: 97 %  Weight: 91.3 kg (201 lb 4.8 oz)  Pain Score: No pain      3/5/2025     9:16 AM 2/14/2025    10:06 AM   Pain Assessment   Pain Score NO PAIN NO PAIN          PHYSICAL EXAM:  Physical Exam  Vitals and nursing note reviewed.   Constitutional:       Appearance: She is well-developed.   HENT:      Head: Normocephalic.   Skin:     General: Skin is warm and dry.      Findings: No erythema.   Neurological:      Mental Status: She is alert and oriented to person, place, and time.   Psychiatric:         Behavior: Behavior normal.         Thought Content: Thought content normal.         Judgment: Judgment normal.              3/5/2025     9:18 AM   Toxicity Assessment   Toxicity Assessment Female Pelvis   Fatigue (lethargy, malaise, asthenia) None   Radiation Dermatitis None   Anorexia Oral intake significantly decreased   Colitis None   Constipation None   Dehydration None   Diarrhea w/o Colostomy None   Flatulence None   Nausea Oral intake significantly decreased   Proctitis None   Vomiting None   RT - Pain due to RT None   Tumor Pain (onset or exacerbation of tumor pain due to treatment) None   Dysuria (painful urination) None   Urinary Frequency Normal   Urinary Urgency None   Bladder Spasms Absent   Incontinence None   Urinary Retention Normal   Vaginitis (not due to infection) None   Vaginal Bleeding None   Vaginal Dryness Normal       CURRENT MEDICATIONS:    Current Outpatient Medications:     acetaminophen (TYLENOL) 500 MG Tab, Take 500-1,000 mg by mouth every 6 hours as needed., Disp: , Rfl:     ibuprofen (MOTRIN) 200 MG Tab, Take 400 mg by mouth every 6 hours as needed., Disp: , Rfl:     ondansetron (ZOFRAN ODT) 4 MG  TABLET DISPERSIBLE, Take 4 mg by mouth every 6 hours as needed for Nausea/Vomiting., Disp: , Rfl:     dexamethasone (DECADRON) 4 MG Tab, Take 4 mg by mouth 2 times a day as needed. For nausea from chemo, Disp: , Rfl:     traMADol (ULTRAM) 50 MG Tab, Take 1 tablet by mouth every 6 hours as needed for pain. (Patient not taking: Reported on 2/14/2025), Disp: 28 Tablet, Rfl: 0    LABORATORY DATA:   Lab Results   Component Value Date/Time    SODIUM 139 03/03/2025 09:20 AM    POTASSIUM 4.3 03/03/2025 09:20 AM    CHLORIDE 101 03/03/2025 09:20 AM    CO2 26 03/03/2025 09:20 AM    GLUCOSE 146 (H) 03/03/2025 09:20 AM    BUN 25 (H) 03/03/2025 09:20 AM    CREATININE 1.18 03/03/2025 09:20 AM       Lab Results   Component Value Date/Time    WBC 4.7 (L) 03/03/2025 09:20 AM    RBC 3.54 (L) 03/03/2025 09:20 AM    HEMOGLOBIN 9.5 (L) 03/03/2025 09:20 AM    HEMATOCRIT 29.5 (L) 03/03/2025 09:20 AM    MCV 83.3 03/03/2025 09:20 AM    MCH 26.8 (L) 03/03/2025 09:20 AM    MCHC 32.2 03/03/2025 09:20 AM    PLATELETCT 147 (L) 03/03/2025 09:20 AM         RADIOLOGY DATA:  MR-PELVIS-WITH & W/O AND SEQUENCES  Result Date: 2/17/2025  There is a 1.8 x 1.4 cm hypoenhancing mass in the anterior lower uterine segment, likely residual mass. Mild irregularity of the anterior uterine wall but no involvement of the vagina. There is a 1.7 x 2.1 cm lobulated nonenhancing mass in the left internal iliac region. 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 on 2/14/2025      PLAN:  No change in treatment plan    Disposition:  Treatment plan and imaging reviewed. Questions answered. Continue therapy outlined.     Ilda JEAN BAPTISTE M.D.    No orders of the defined types were placed in this  encounter.

## 2025-03-06 ENCOUNTER — HOSPITAL ENCOUNTER (OUTPATIENT)
Dept: RADIATION ONCOLOGY | Facility: MEDICAL CENTER | Age: 70
End: 2025-03-06
Payer: MEDICARE

## 2025-03-06 LAB
CHEMOTHERAPY INFUSION START DATE: NORMAL
CHEMOTHERAPY RECORDS: 2.4 GY
CHEMOTHERAPY RECORDS: 6000 CGY
CHEMOTHERAPY RECORDS: NORMAL
CHEMOTHERAPY RX CANCER: NORMAL
DATE 1ST CHEMO CANCER: NORMAL
RAD ONC ARIA COURSE LAST TREATMENT DATE: NORMAL
RAD ONC ARIA COURSE TREATMENT ELAPSED DAYS: NORMAL
RAD ONC ARIA REFERENCE POINT DOSAGE GIVEN TO DATE: 9.6 GY
RAD ONC ARIA REFERENCE POINT ID: NORMAL
RAD ONC ARIA REFERENCE POINT SESSION DOSAGE GIVEN: 2.4 GY

## 2025-03-06 PROCEDURE — 77386 HCHG IMRT DELIVERY COMPLEX: CPT | Performed by: RADIOLOGY

## 2025-03-06 PROCEDURE — 77014 PR CT GUIDANCE PLACEMENT RAD THERAPY FIELDS: CPT | Mod: 26 | Performed by: RADIOLOGY

## 2025-03-06 RX ORDER — 0.9 % SODIUM CHLORIDE 0.9 %
VIAL (ML) INJECTION PRN
Status: CANCELLED | OUTPATIENT
Start: 2025-03-17

## 2025-03-06 RX ORDER — ONDANSETRON 2 MG/ML
4 INJECTION INTRAMUSCULAR; INTRAVENOUS PRN
Status: CANCELLED | OUTPATIENT
Start: 2025-03-17

## 2025-03-06 RX ORDER — 0.9 % SODIUM CHLORIDE 0.9 %
10 VIAL (ML) INJECTION PRN
Status: CANCELLED | OUTPATIENT
Start: 2025-03-17

## 2025-03-06 RX ORDER — SODIUM CHLORIDE 9 MG/ML
500 INJECTION, SOLUTION INTRAVENOUS ONCE
Status: CANCELLED | OUTPATIENT
Start: 2025-03-17

## 2025-03-06 RX ORDER — 0.9 % SODIUM CHLORIDE 0.9 %
3 VIAL (ML) INJECTION PRN
Status: CANCELLED | OUTPATIENT
Start: 2025-03-17

## 2025-03-06 RX ORDER — 0.9 % SODIUM CHLORIDE 0.9 %
10 VIAL (ML) INJECTION PRN
Status: CANCELLED | OUTPATIENT
Start: 2025-03-16

## 2025-03-06 RX ORDER — PROCHLORPERAZINE MALEATE 10 MG
10 TABLET ORAL EVERY 6 HOURS PRN
Status: CANCELLED | OUTPATIENT
Start: 2025-03-17

## 2025-03-06 RX ORDER — 0.9 % SODIUM CHLORIDE 0.9 %
3 VIAL (ML) INJECTION PRN
Status: CANCELLED | OUTPATIENT
Start: 2025-03-16

## 2025-03-06 RX ORDER — DEXAMETHASONE SODIUM PHOSPHATE 4 MG/ML
12 INJECTION, SOLUTION INTRA-ARTICULAR; INTRALESIONAL; INTRAMUSCULAR; INTRAVENOUS; SOFT TISSUE ONCE
Status: CANCELLED | OUTPATIENT
Start: 2025-03-17 | End: 2025-03-17

## 2025-03-06 RX ORDER — METHYLPREDNISOLONE SODIUM SUCCINATE 125 MG/2ML
125 INJECTION, POWDER, LYOPHILIZED, FOR SOLUTION INTRAMUSCULAR; INTRAVENOUS PRN
Status: CANCELLED | OUTPATIENT
Start: 2025-03-17

## 2025-03-06 RX ORDER — EPINEPHRINE 1 MG/ML(1)
0.5 AMPUL (ML) INJECTION PRN
Status: CANCELLED | OUTPATIENT
Start: 2025-03-17

## 2025-03-06 RX ORDER — SODIUM CHLORIDE 9 MG/ML
INJECTION, SOLUTION INTRAVENOUS CONTINUOUS
Status: CANCELLED | OUTPATIENT
Start: 2025-03-17

## 2025-03-06 RX ORDER — 0.9 % SODIUM CHLORIDE 0.9 %
VIAL (ML) INJECTION PRN
Status: CANCELLED | OUTPATIENT
Start: 2025-03-16

## 2025-03-06 RX ORDER — DIPHENHYDRAMINE HYDROCHLORIDE 50 MG/ML
50 INJECTION, SOLUTION INTRAMUSCULAR; INTRAVENOUS PRN
Status: CANCELLED | OUTPATIENT
Start: 2025-03-17

## 2025-03-06 RX ORDER — ONDANSETRON 8 MG/1
8 TABLET, ORALLY DISINTEGRATING ORAL PRN
Status: CANCELLED | OUTPATIENT
Start: 2025-03-17

## 2025-03-06 RX ORDER — PALONOSETRON 0.05 MG/ML
0.25 INJECTION, SOLUTION INTRAVENOUS ONCE
Status: CANCELLED | OUTPATIENT
Start: 2025-03-17 | End: 2025-03-17

## 2025-03-07 ENCOUNTER — HOSPITAL ENCOUNTER (OUTPATIENT)
Dept: RADIATION ONCOLOGY | Facility: MEDICAL CENTER | Age: 70
End: 2025-03-07
Payer: MEDICARE

## 2025-03-07 LAB
CHEMOTHERAPY INFUSION START DATE: NORMAL
CHEMOTHERAPY RECORDS: 2.4 GY
CHEMOTHERAPY RECORDS: 6000 CGY
CHEMOTHERAPY RECORDS: NORMAL
CHEMOTHERAPY RX CANCER: NORMAL
DATE 1ST CHEMO CANCER: NORMAL
RAD ONC ARIA COURSE LAST TREATMENT DATE: NORMAL
RAD ONC ARIA COURSE TREATMENT ELAPSED DAYS: NORMAL
RAD ONC ARIA REFERENCE POINT DOSAGE GIVEN TO DATE: 12 GY
RAD ONC ARIA REFERENCE POINT ID: NORMAL
RAD ONC ARIA REFERENCE POINT SESSION DOSAGE GIVEN: 2.4 GY

## 2025-03-07 PROCEDURE — 77014 PR CT GUIDANCE PLACEMENT RAD THERAPY FIELDS: CPT | Mod: 26 | Performed by: RADIOLOGY

## 2025-03-07 PROCEDURE — 77427 RADIATION TX MANAGEMENT X5: CPT | Performed by: RADIOLOGY

## 2025-03-07 PROCEDURE — 77386 HCHG IMRT DELIVERY COMPLEX: CPT | Performed by: RADIOLOGY

## 2025-03-10 ENCOUNTER — OUTPATIENT INFUSION SERVICES (OUTPATIENT)
Dept: ONCOLOGY | Facility: MEDICAL CENTER | Age: 70
End: 2025-03-10
Attending: SPECIALIST
Payer: MEDICARE

## 2025-03-10 ENCOUNTER — HOSPITAL ENCOUNTER (OUTPATIENT)
Dept: RADIATION ONCOLOGY | Facility: MEDICAL CENTER | Age: 70
End: 2025-03-10

## 2025-03-10 VITALS
SYSTOLIC BLOOD PRESSURE: 151 MMHG | WEIGHT: 199.08 LBS | RESPIRATION RATE: 18 BRPM | DIASTOLIC BLOOD PRESSURE: 82 MMHG | HEIGHT: 61 IN | TEMPERATURE: 96.8 F | OXYGEN SATURATION: 97 % | HEART RATE: 103 BPM | BODY MASS INDEX: 37.59 KG/M2

## 2025-03-10 DIAGNOSIS — C53.8 MALIGNANT NEOPLASM OVERLAPPING CERVIX UTERI SITE (HCC): ICD-10-CM

## 2025-03-10 LAB
ANION GAP SERPL CALC-SCNC: 9 MMOL/L (ref 7–16)
BASOPHILS # BLD AUTO: 0.4 % (ref 0–1.8)
BASOPHILS # BLD: 0.02 K/UL (ref 0–0.12)
BUN SERPL-MCNC: 20 MG/DL (ref 8–22)
CALCIUM SERPL-MCNC: 9.6 MG/DL (ref 8.5–10.5)
CHEMOTHERAPY INFUSION START DATE: NORMAL
CHEMOTHERAPY RECORDS: 2.4 GY
CHEMOTHERAPY RECORDS: 6000 CGY
CHEMOTHERAPY RECORDS: NORMAL
CHEMOTHERAPY RX CANCER: NORMAL
CHLORIDE SERPL-SCNC: 103 MMOL/L (ref 96–112)
CO2 SERPL-SCNC: 26 MMOL/L (ref 20–33)
CREAT SERPL-MCNC: 1.04 MG/DL (ref 0.5–1.4)
DATE 1ST CHEMO CANCER: NORMAL
EOSINOPHIL # BLD AUTO: 0.06 K/UL (ref 0–0.51)
EOSINOPHIL NFR BLD: 1.1 % (ref 0–6.9)
ERYTHROCYTE [DISTWIDTH] IN BLOOD BY AUTOMATED COUNT: 49.8 FL (ref 35.9–50)
GFR SERPLBLD CREATININE-BSD FMLA CKD-EPI: 58 ML/MIN/1.73 M 2
GLUCOSE SERPL-MCNC: 153 MG/DL (ref 65–99)
HCT VFR BLD AUTO: 25.6 % (ref 37–47)
HGB BLD-MCNC: 8.3 G/DL (ref 12–16)
IMM GRANULOCYTES # BLD AUTO: 0.06 K/UL (ref 0–0.11)
IMM GRANULOCYTES NFR BLD AUTO: 1.1 % (ref 0–0.9)
LYMPHOCYTES # BLD AUTO: 1.22 K/UL (ref 1–4.8)
LYMPHOCYTES NFR BLD: 22.2 % (ref 22–41)
MAGNESIUM SERPL-MCNC: 1.2 MG/DL (ref 1.5–2.5)
MCH RBC QN AUTO: 26.9 PG (ref 27–33)
MCHC RBC AUTO-ENTMCNC: 32.4 G/DL (ref 32.2–35.5)
MCV RBC AUTO: 82.8 FL (ref 81.4–97.8)
MONOCYTES # BLD AUTO: 0.49 K/UL (ref 0–0.85)
MONOCYTES NFR BLD AUTO: 8.9 % (ref 0–13.4)
NEUTROPHILS # BLD AUTO: 3.65 K/UL (ref 1.82–7.42)
NEUTROPHILS NFR BLD: 66.3 % (ref 44–72)
NRBC # BLD AUTO: 0.02 K/UL
NRBC BLD-RTO: 0.4 /100 WBC (ref 0–0.2)
OUTPT INFUS CBC COMMENT OICOM: ABNORMAL
PLATELET # BLD AUTO: 141 K/UL (ref 164–446)
PMV BLD AUTO: 9.6 FL (ref 9–12.9)
POTASSIUM SERPL-SCNC: 4 MMOL/L (ref 3.6–5.5)
RAD ONC ARIA COURSE LAST TREATMENT DATE: NORMAL
RAD ONC ARIA COURSE TREATMENT ELAPSED DAYS: NORMAL
RAD ONC ARIA REFERENCE POINT DOSAGE GIVEN TO DATE: 14.4 GY
RAD ONC ARIA REFERENCE POINT ID: NORMAL
RAD ONC ARIA REFERENCE POINT SESSION DOSAGE GIVEN: 2.4 GY
RBC # BLD AUTO: 3.09 M/UL (ref 4.2–5.4)
SODIUM SERPL-SCNC: 138 MMOL/L (ref 135–145)
WBC # BLD AUTO: 5.5 K/UL (ref 4.8–10.8)

## 2025-03-10 PROCEDURE — 83735 ASSAY OF MAGNESIUM: CPT

## 2025-03-10 PROCEDURE — 96413 CHEMO IV INFUSION 1 HR: CPT

## 2025-03-10 PROCEDURE — 80048 BASIC METABOLIC PNL TOTAL CA: CPT

## 2025-03-10 PROCEDURE — 85025 COMPLETE CBC W/AUTO DIFF WBC: CPT

## 2025-03-10 PROCEDURE — 77386 HCHG IMRT DELIVERY COMPLEX: CPT | Performed by: RADIOLOGY

## 2025-03-10 PROCEDURE — 96368 THER/DIAG CONCURRENT INF: CPT

## 2025-03-10 PROCEDURE — 96367 TX/PROPH/DG ADDL SEQ IV INF: CPT

## 2025-03-10 PROCEDURE — A4212 NON CORING NEEDLE OR STYLET: HCPCS

## 2025-03-10 PROCEDURE — 700105 HCHG RX REV CODE 258: Performed by: STUDENT IN AN ORGANIZED HEALTH CARE EDUCATION/TRAINING PROGRAM

## 2025-03-10 PROCEDURE — 700111 HCHG RX REV CODE 636 W/ 250 OVERRIDE (IP)

## 2025-03-10 PROCEDURE — 700111 HCHG RX REV CODE 636 W/ 250 OVERRIDE (IP): Performed by: STUDENT IN AN ORGANIZED HEALTH CARE EDUCATION/TRAINING PROGRAM

## 2025-03-10 PROCEDURE — 96375 TX/PRO/DX INJ NEW DRUG ADDON: CPT

## 2025-03-10 PROCEDURE — 77014 PR CT GUIDANCE PLACEMENT RAD THERAPY FIELDS: CPT | Mod: 26 | Performed by: RADIOLOGY

## 2025-03-10 RX ORDER — SODIUM CHLORIDE 9 MG/ML
500 INJECTION, SOLUTION INTRAVENOUS ONCE
Status: COMPLETED | OUTPATIENT
Start: 2025-03-10 | End: 2025-03-10

## 2025-03-10 RX ORDER — DEXAMETHASONE SODIUM PHOSPHATE 4 MG/ML
12 INJECTION, SOLUTION INTRA-ARTICULAR; INTRALESIONAL; INTRAMUSCULAR; INTRAVENOUS; SOFT TISSUE ONCE
Status: COMPLETED | OUTPATIENT
Start: 2025-03-10 | End: 2025-03-10

## 2025-03-10 RX ORDER — MAGNESIUM SULFATE HEPTAHYDRATE 40 MG/ML
4 INJECTION, SOLUTION INTRAVENOUS ONCE
Status: COMPLETED | OUTPATIENT
Start: 2025-03-10 | End: 2025-03-10

## 2025-03-10 RX ORDER — PALONOSETRON 0.05 MG/ML
0.25 INJECTION, SOLUTION INTRAVENOUS ONCE
Status: COMPLETED | OUTPATIENT
Start: 2025-03-10 | End: 2025-03-10

## 2025-03-10 RX ADMIN — SODIUM CHLORIDE 500 ML: 9 INJECTION, SOLUTION INTRAVENOUS at 10:17

## 2025-03-10 RX ADMIN — DEXAMETHASONE SODIUM PHOSPHATE 12 MG: 4 INJECTION INTRA-ARTICULAR; INTRALESIONAL; INTRAMUSCULAR; INTRAVENOUS; SOFT TISSUE at 11:14

## 2025-03-10 RX ADMIN — CISPLATIN 80 MG: 1 INJECTION, SOLUTION INTRAVENOUS at 12:19

## 2025-03-10 RX ADMIN — FOSAPREPITANT 150 MG: 150 INJECTION, POWDER, LYOPHILIZED, FOR SOLUTION INTRAVENOUS at 11:29

## 2025-03-10 RX ADMIN — PALONOSETRON HYDROCHLORIDE 0.25 MG: 0.25 INJECTION INTRAVENOUS at 11:14

## 2025-03-10 RX ADMIN — POTASSIUM CHLORIDE: 2 INJECTION, SOLUTION, CONCENTRATE INTRAVENOUS at 13:26

## 2025-03-10 RX ADMIN — MAGNESIUM SULFATE HEPTAHYDRATE 4 G: 4 INJECTION, SOLUTION INTRAVENOUS at 12:14

## 2025-03-10 ASSESSMENT — FIBROSIS 4 INDEX: FIB4 SCORE: 3.51

## 2025-03-10 NOTE — PROGRESS NOTES
Chemotherapy Verification - PRIMARY RN      Height = 154 cm  Weight = 90.3 kg  BSA = 1.97 m2       Medication: Cisplatin  Dose: 40 mg/m2  Calculated Dose: 78.8 mg                                  I confirm this process was performed independently with the BSA and all final chemotherapy dosing calculations congruent.  Any discrepancies of 10% or greater have been addressed with the chemotherapy pharmacist. The resolution of the discrepancy has been documented in the EPIC progress notes.

## 2025-03-10 NOTE — PROGRESS NOTES
Diane arrives ambulatory to [facility] for Cycle 2  Cisplatin for cervical cancer.  Diane denies any new or acute changes, reports tolerating past treatments well.  Right chest port accessed in sterile fashion, flushes easily, brisk blood return observed.  Labs collected and reviewed, parameters met for treatment today.  Pre-hydration, pre-medications, chemotherapy and post hydration infused as ordered, magnesium replaced per protocol for magnesium 1.2.   Diane tolerated well.  Port flushed with NS, heparin instilled, port de-accessed, sterile gauze and tape to site.  Diane discharged in NAD, next appointment confirmed.

## 2025-03-10 NOTE — PROGRESS NOTES
Chemotherapy Verification - SECONDARY RN       Height = 154 cm  Weight = 90.3 kg  BSA = 1.97 m2       Medication: Cisplatin  Dose: 40 mg/m2  Calculated Dose: 78.8 mg                             (In mg/m2, AUC, mg/kg)     I confirm that this process was performed independently.

## 2025-03-10 NOTE — PROGRESS NOTES
"Pharmacy Chemotherapy Calculations    Dx: Cervical cancer    Cycle 2  Previous treatment = C1 3/3/25    Protocol: Cisplatin + XRT   Cisplatin 40mg/m2 IV over 60 min on day 1  Weekly x 6 cycles with concurrent radiation  NCCN Guidelines for Cervical Cancer V.3.2025  Janee HM, et al- N Engl J Med. 1999 Apr 15;340(15):1150-61.  Leann CASTILLO et al - J Clin Oncol. 2007 Jul 1;25(19):3335-10.    Allergies:  Morphine and Morphine       BP (!) 151/82   Pulse (!) 103   Temp 36 °C (96.8 °F) (Temporal)   Resp 18   Ht 1.54 m (5' 0.63\")   Wt 90.3 kg (199 lb 1.2 oz)   SpO2 97%   BMI 38.08 kg/m²  Body surface area is 1.97 meters squared.    All lab results 3/10/25 within treatment parameters. Magnesium to be replaced.     Cisplatin 40 mg/m² x 1.97 m² = 78.8 mg   <10% difference, OK to treat with final dose = 80 mg       Raymond Hilliard, PharmD  "

## 2025-03-11 ENCOUNTER — HOSPITAL ENCOUNTER (OUTPATIENT)
Dept: RADIATION ONCOLOGY | Facility: MEDICAL CENTER | Age: 70
End: 2025-03-11
Payer: MEDICARE

## 2025-03-11 ENCOUNTER — APPOINTMENT (OUTPATIENT)
Dept: ADMISSIONS | Facility: MEDICAL CENTER | Age: 70
End: 2025-03-11
Payer: MEDICARE

## 2025-03-11 LAB
CHEMOTHERAPY INFUSION START DATE: NORMAL
CHEMOTHERAPY RECORDS: 2.4 GY
CHEMOTHERAPY RECORDS: 6000 CGY
CHEMOTHERAPY RECORDS: NORMAL
CHEMOTHERAPY RX CANCER: NORMAL
DATE 1ST CHEMO CANCER: NORMAL
RAD ONC ARIA COURSE LAST TREATMENT DATE: NORMAL
RAD ONC ARIA COURSE TREATMENT ELAPSED DAYS: NORMAL
RAD ONC ARIA REFERENCE POINT DOSAGE GIVEN TO DATE: 16.8 GY
RAD ONC ARIA REFERENCE POINT ID: NORMAL
RAD ONC ARIA REFERENCE POINT SESSION DOSAGE GIVEN: 2.4 GY

## 2025-03-11 PROCEDURE — 77014 PR CT GUIDANCE PLACEMENT RAD THERAPY FIELDS: CPT | Mod: 26 | Performed by: RADIOLOGY

## 2025-03-11 PROCEDURE — 77386 HCHG IMRT DELIVERY COMPLEX: CPT | Performed by: RADIOLOGY

## 2025-03-12 ENCOUNTER — PATIENT OUTREACH (OUTPATIENT)
Dept: ONCOLOGY | Facility: MEDICAL CENTER | Age: 70
End: 2025-03-12
Payer: MEDICARE

## 2025-03-12 ENCOUNTER — HOSPITAL ENCOUNTER (OUTPATIENT)
Dept: RADIATION ONCOLOGY | Facility: MEDICAL CENTER | Age: 70
End: 2025-03-12
Attending: RADIOLOGY
Payer: MEDICARE

## 2025-03-12 ENCOUNTER — HOSPITAL ENCOUNTER (OUTPATIENT)
Dept: RADIATION ONCOLOGY | Facility: MEDICAL CENTER | Age: 70
End: 2025-03-12
Payer: MEDICARE

## 2025-03-12 VITALS
SYSTOLIC BLOOD PRESSURE: 154 MMHG | TEMPERATURE: 97.3 F | HEART RATE: 75 BPM | OXYGEN SATURATION: 98 % | WEIGHT: 204 LBS | BODY MASS INDEX: 39.02 KG/M2 | DIASTOLIC BLOOD PRESSURE: 75 MMHG

## 2025-03-12 LAB
CHEMOTHERAPY INFUSION START DATE: NORMAL
CHEMOTHERAPY RECORDS: 2.4 GY
CHEMOTHERAPY RECORDS: 6000 CGY
CHEMOTHERAPY RECORDS: NORMAL
CHEMOTHERAPY RX CANCER: NORMAL
DATE 1ST CHEMO CANCER: NORMAL
RAD ONC ARIA COURSE LAST TREATMENT DATE: NORMAL
RAD ONC ARIA COURSE TREATMENT ELAPSED DAYS: NORMAL
RAD ONC ARIA REFERENCE POINT DOSAGE GIVEN TO DATE: 19.2 GY
RAD ONC ARIA REFERENCE POINT ID: NORMAL
RAD ONC ARIA REFERENCE POINT SESSION DOSAGE GIVEN: 2.4 GY

## 2025-03-12 PROCEDURE — 77336 RADIATION PHYSICS CONSULT: CPT | Performed by: RADIOLOGY

## 2025-03-12 PROCEDURE — 77386 HCHG IMRT DELIVERY COMPLEX: CPT | Performed by: RADIOLOGY

## 2025-03-12 PROCEDURE — 77014 PR CT GUIDANCE PLACEMENT RAD THERAPY FIELDS: CPT | Mod: 26 | Performed by: RADIOLOGY

## 2025-03-12 RX ORDER — ONDANSETRON 8 MG/1
8 TABLET, ORALLY DISINTEGRATING ORAL PRN
Status: CANCELLED | OUTPATIENT
Start: 2025-03-24

## 2025-03-12 RX ORDER — 0.9 % SODIUM CHLORIDE 0.9 %
10 VIAL (ML) INJECTION PRN
Status: CANCELLED | OUTPATIENT
Start: 2025-03-23

## 2025-03-12 RX ORDER — 0.9 % SODIUM CHLORIDE 0.9 %
VIAL (ML) INJECTION PRN
Status: CANCELLED | OUTPATIENT
Start: 2025-03-24

## 2025-03-12 RX ORDER — 0.9 % SODIUM CHLORIDE 0.9 %
3 VIAL (ML) INJECTION PRN
Status: CANCELLED | OUTPATIENT
Start: 2025-03-24

## 2025-03-12 RX ORDER — 0.9 % SODIUM CHLORIDE 0.9 %
10 VIAL (ML) INJECTION PRN
Status: CANCELLED | OUTPATIENT
Start: 2025-03-24

## 2025-03-12 RX ORDER — DIPHENHYDRAMINE HYDROCHLORIDE 50 MG/ML
50 INJECTION, SOLUTION INTRAMUSCULAR; INTRAVENOUS PRN
Status: CANCELLED | OUTPATIENT
Start: 2025-03-24

## 2025-03-12 RX ORDER — EPINEPHRINE 1 MG/ML(1)
0.5 AMPUL (ML) INJECTION PRN
Status: CANCELLED | OUTPATIENT
Start: 2025-03-24

## 2025-03-12 RX ORDER — PALONOSETRON 0.05 MG/ML
0.25 INJECTION, SOLUTION INTRAVENOUS ONCE
Status: CANCELLED | OUTPATIENT
Start: 2025-03-24 | End: 2025-03-24

## 2025-03-12 RX ORDER — ONDANSETRON 2 MG/ML
4 INJECTION INTRAMUSCULAR; INTRAVENOUS PRN
Status: CANCELLED | OUTPATIENT
Start: 2025-03-24

## 2025-03-12 RX ORDER — SODIUM CHLORIDE 9 MG/ML
500 INJECTION, SOLUTION INTRAVENOUS ONCE
Status: CANCELLED | OUTPATIENT
Start: 2025-03-24

## 2025-03-12 RX ORDER — SODIUM CHLORIDE 9 MG/ML
INJECTION, SOLUTION INTRAVENOUS CONTINUOUS
Status: CANCELLED | OUTPATIENT
Start: 2025-03-24

## 2025-03-12 RX ORDER — PROCHLORPERAZINE MALEATE 10 MG
10 TABLET ORAL EVERY 6 HOURS PRN
Status: CANCELLED | OUTPATIENT
Start: 2025-03-24

## 2025-03-12 RX ORDER — METHYLPREDNISOLONE SODIUM SUCCINATE 125 MG/2ML
125 INJECTION, POWDER, LYOPHILIZED, FOR SOLUTION INTRAMUSCULAR; INTRAVENOUS PRN
Status: CANCELLED | OUTPATIENT
Start: 2025-03-24

## 2025-03-12 RX ORDER — DEXAMETHASONE SODIUM PHOSPHATE 4 MG/ML
12 INJECTION, SOLUTION INTRA-ARTICULAR; INTRALESIONAL; INTRAMUSCULAR; INTRAVENOUS; SOFT TISSUE ONCE
Status: CANCELLED | OUTPATIENT
Start: 2025-03-24 | End: 2025-03-24

## 2025-03-12 RX ORDER — 0.9 % SODIUM CHLORIDE 0.9 %
VIAL (ML) INJECTION PRN
Status: CANCELLED | OUTPATIENT
Start: 2025-03-23

## 2025-03-12 RX ORDER — 0.9 % SODIUM CHLORIDE 0.9 %
3 VIAL (ML) INJECTION PRN
Status: CANCELLED | OUTPATIENT
Start: 2025-03-23

## 2025-03-12 ASSESSMENT — PAIN SCALES - GENERAL: PAINLEVEL_OUTOF10: NO PAIN

## 2025-03-12 ASSESSMENT — FIBROSIS 4 INDEX: FIB4 SCORE: 3.66

## 2025-03-12 NOTE — ON TREATMENT VISIT
"  ON TREATMENT  NOTE  RADIATION ONCOLOGY DEPARTMENT    Patient name:  Diane Barrett    Primary Physician:  Brendan Cho M.D. MRN: [id number]  CSN: [id number]   Referring physician:  Ambrose Silverman M.D.   : 1955, 69 y.o.     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 on 2025  Histopathologic type: Small cell carcinoma, NOS  Stage prefix: Initial diagnosis  Para-aortic status: Positive  Para-aortic zackary method of assessment: PET  Histologic grade (G): G3  Histologic grading system: 3 grade system  P16 overexpression: Positive      TREATMENT SUMMARY:  Course First Treatment Date 2025  Course Last Treatment Date 2025  Radiation Therapy Episodes       Active Episodes       Radiation Therapy: IMRT (3/3/2025)                   Radiation Treatments         Plan Last Treated On Elapsed Days Fractions Treated Prescribed Fraction Dose (cGy) Prescribed Total Dose (cGy)    Pelvis 3/12/2025 9 @ 2025 8 of 25 240 6,000                  Reference Point Last Treated On Elapsed Days Most Recent Session Dose (cGy) Total Dose (cGy)    Pelvis 3/12/2025 9 @ 2025 240 1,920                               SUBJECTIVE:  Talk treatments well.  Mild diarrhea.    VITAL SIGNS:      3/12/2025    10:31 AM 3/10/2025     9:24 AM 3/5/2025     9:16 AM 3/3/2025     9:09 AM 2025     4:43 PM 2025     3:06 PM 2025     2:43 PM   Vitals   SYSTOLIC 154 151 133 160 150 140 138   DIASTOLIC 75 82 82 90 79 73 84   Pulse 75 103 80 128 97 90 89   Temperature 36.3 °C (97.3 °F) 36 °C (96.8 °F) 36.3 °C (97.3 °F) 36.1 °C (97 °F) 36.4 °C (97.6 °F) 36.3 °C (97.4 °F) 36.3 °C (97.4 °F)   Respiration  18  18 18 18 18   Weight 204 199.08 201.3 198.41      Height  1.54 m (5' 0.63\")  1.54 m (5' 0.63\")      BMI 39.02 " kg/m2 38.08 kg/m2 38.5 kg/m2 37.95 kg/m2      Pulse Oximetry 98 % 97 % 97 % 95 % 92 % 94 % 94 %     KPS: 100, Fully active, able to carry on all pre-disease performed without restriction (ECOG equivalent 0)  Encounter Vitals  Temperature: 36.3 °C (97.3 °F)  Temp src: Temporal  Blood Pressure : (!) 154/75  Pulse: 75  Pulse Oximetry: 98 %  Weight: 92.5 kg (204 lb)  Pain Score: No pain      3/12/2025    10:31 AM 3/5/2025     9:16 AM 2/14/2025    10:06 AM   Pain Assessment   Pain Score NO PAIN NO PAIN NO PAIN          PHYSICAL EXAM:  Physical Exam  Vitals and nursing note reviewed.   Constitutional:       Appearance: She is well-developed.   HENT:      Head: Normocephalic.   Skin:     General: Skin is warm and dry.      Findings: No erythema.   Neurological:      Mental Status: She is alert and oriented to person, place, and time.   Psychiatric:         Behavior: Behavior normal.         Thought Content: Thought content normal.         Judgment: Judgment normal.              3/12/2025    10:33 AM 3/5/2025     9:18 AM   Toxicity Assessment   Toxicity Assessment Female Pelvis Female Pelvis   Fatigue (lethargy, malaise, asthenia) Increased fatigue over baseline, but not altering normal activities None   Radiation Dermatitis None None   Anorexia Oral intake significantly decreased Oral intake significantly decreased   Colitis Abdominal pain with mucus and/or blood in stool None   Constipation None None   Dehydration Dry mucous membranes and/or diminished skin turgor None   Diarrhea w/o Colostomy Increase of <4 stools/day over pre-treatment None   Flatulence None None   Nausea Oral intake significantly decreased Oral intake significantly decreased   Proctitis Increased stool frequency, occasional blood-streaked stools or rectal discomfort (including hemorrhoids) not requiring medication None   Vomiting None None   RT - Pain due to RT None None   Tumor Pain (onset or exacerbation of tumor pain due to treatment) None None    Dysuria (painful urination) None None   Urinary Frequency Normal Normal   Urinary Urgency None None   Bladder Spasms Absent Absent   Incontinence None None   Urinary Retention Normal Normal   Vaginitis (not due to infection) None None   Vaginal Bleeding None None   Vaginal Dryness Normal Normal       CURRENT MEDICATIONS:    Current Outpatient Medications:     acetaminophen (TYLENOL) 500 MG Tab, Take 500-1,000 mg by mouth every 6 hours as needed., Disp: , Rfl:     ibuprofen (MOTRIN) 200 MG Tab, Take 400 mg by mouth every 6 hours as needed., Disp: , Rfl:     ondansetron (ZOFRAN ODT) 4 MG TABLET DISPERSIBLE, Take 4 mg by mouth every 6 hours as needed for Nausea/Vomiting., Disp: , Rfl:     dexamethasone (DECADRON) 4 MG Tab, Take 4 mg by mouth 2 times a day as needed. For nausea from chemo, Disp: , Rfl:     traMADol (ULTRAM) 50 MG Tab, Take 1 tablet by mouth every 6 hours as needed for pain. (Patient not taking: Reported on 2/14/2025), Disp: 28 Tablet, Rfl: 0    LABORATORY DATA:   Lab Results   Component Value Date/Time    SODIUM 138 03/10/2025 09:40 AM    POTASSIUM 4.0 03/10/2025 09:40 AM    CHLORIDE 103 03/10/2025 09:40 AM    CO2 26 03/10/2025 09:40 AM    GLUCOSE 153 (H) 03/10/2025 09:40 AM    BUN 20 03/10/2025 09:40 AM    CREATININE 1.04 03/10/2025 09:40 AM       Lab Results   Component Value Date/Time    WBC 5.5 03/10/2025 09:40 AM    RBC 3.09 (L) 03/10/2025 09:40 AM    HEMOGLOBIN 8.3 (L) 03/10/2025 09:40 AM    HEMATOCRIT 25.6 (L) 03/10/2025 09:40 AM    MCV 82.8 03/10/2025 09:40 AM    MCH 26.9 (L) 03/10/2025 09:40 AM    MCHC 32.4 03/10/2025 09:40 AM    PLATELETCT 141 (L) 03/10/2025 09:40 AM         RADIOLOGY DATA:  MR-PELVIS-WITH & W/O AND SEQUENCES  Result Date: 2/17/2025  There is a 1.8 x 1.4 cm hypoenhancing mass in the anterior lower uterine segment, likely residual mass. Mild irregularity of the anterior uterine wall but no involvement of the vagina. There is a 1.7 x 2.1 cm lobulated nonenhancing mass in the  left internal iliac region. 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 on 2/14/2025      PLAN:  No change in treatment plan    Disposition:  Treatment plan and imaging reviewed. Questions answered. Continue therapy outlined.     Ilda JEAN BAPTISTE M.D.    No orders of the defined types were placed in this encounter.

## 2025-03-12 NOTE — PROGRESS NOTES
On March 12, 2025, Oncology Social Worker Jane Song processed pt.'s [facility] [employer] application additional request.  Pt. will be covered for a total of 4 nights.      3/3/2025-3/4/2025-Reservation #[id number] -1 night  3/17/2025-3/18/2025-Reservation #[id number] night  3/24/2025-3/25/2025-Reservation #[id number] night  3/31/2025-4/1/2025-Reservation #[id number] night     OSW Duncan contacted pt. via telephone to confirm reservations.

## 2025-03-13 ENCOUNTER — HOSPITAL ENCOUNTER (OUTPATIENT)
Dept: RADIATION ONCOLOGY | Facility: MEDICAL CENTER | Age: 70
End: 2025-03-13
Payer: MEDICARE

## 2025-03-13 ENCOUNTER — HOSPITAL ENCOUNTER (OUTPATIENT)
Dept: LAB | Facility: MEDICAL CENTER | Age: 70
End: 2025-03-13
Attending: STUDENT IN AN ORGANIZED HEALTH CARE EDUCATION/TRAINING PROGRAM
Payer: MEDICARE

## 2025-03-13 LAB
BASOPHILS # BLD AUTO: 0.3 % (ref 0–1.8)
BASOPHILS # BLD: 0.01 K/UL (ref 0–0.12)
CHEMOTHERAPY INFUSION START DATE: NORMAL
CHEMOTHERAPY RECORDS: 2.4 GY
CHEMOTHERAPY RECORDS: 6000 CGY
CHEMOTHERAPY RECORDS: NORMAL
CHEMOTHERAPY RX CANCER: NORMAL
DATE 1ST CHEMO CANCER: NORMAL
EOSINOPHIL # BLD AUTO: 0.04 K/UL (ref 0–0.51)
EOSINOPHIL NFR BLD: 1 % (ref 0–6.9)
ERYTHROCYTE [DISTWIDTH] IN BLOOD BY AUTOMATED COUNT: 53.1 FL (ref 35.9–50)
HCT VFR BLD AUTO: 26.6 % (ref 37–47)
HGB BLD-MCNC: 8.4 G/DL (ref 12–16)
IMM GRANULOCYTES # BLD AUTO: 0.02 K/UL (ref 0–0.11)
IMM GRANULOCYTES NFR BLD AUTO: 0.5 % (ref 0–0.9)
LYMPHOCYTES # BLD AUTO: 0.64 K/UL (ref 1–4.8)
LYMPHOCYTES NFR BLD: 16 % (ref 22–41)
MCH RBC QN AUTO: 27 PG (ref 27–33)
MCHC RBC AUTO-ENTMCNC: 31.6 G/DL (ref 32.2–35.5)
MCV RBC AUTO: 85.5 FL (ref 81.4–97.8)
MONOCYTES # BLD AUTO: 0.41 K/UL (ref 0–0.85)
MONOCYTES NFR BLD AUTO: 10.3 % (ref 0–13.4)
NEUTROPHILS # BLD AUTO: 2.87 K/UL (ref 1.82–7.42)
NEUTROPHILS NFR BLD: 71.9 % (ref 44–72)
NRBC # BLD AUTO: 0 K/UL
NRBC BLD-RTO: 0 /100 WBC (ref 0–0.2)
NTRA SIGNATERA MTM READOUT: 0.02 MTM/ML
NTRA SIGNATERA TEST RESULT: POSITIVE
PLATELET # BLD AUTO: 155 K/UL (ref 164–446)
PMV BLD AUTO: 11.2 FL (ref 9–12.9)
RAD ONC ARIA COURSE LAST TREATMENT DATE: NORMAL
RAD ONC ARIA COURSE TREATMENT ELAPSED DAYS: NORMAL
RAD ONC ARIA REFERENCE POINT DOSAGE GIVEN TO DATE: 21.6 GY
RAD ONC ARIA REFERENCE POINT ID: NORMAL
RAD ONC ARIA REFERENCE POINT SESSION DOSAGE GIVEN: 2.4 GY
RBC # BLD AUTO: 3.11 M/UL (ref 4.2–5.4)
WBC # BLD AUTO: 4 K/UL (ref 4.8–10.8)

## 2025-03-13 PROCEDURE — 77014 PR CT GUIDANCE PLACEMENT RAD THERAPY FIELDS: CPT | Mod: 26 | Performed by: RADIOLOGY

## 2025-03-13 PROCEDURE — 77386 HCHG IMRT DELIVERY COMPLEX: CPT | Performed by: RADIOLOGY

## 2025-03-13 PROCEDURE — 85025 COMPLETE CBC W/AUTO DIFF WBC: CPT

## 2025-03-13 PROCEDURE — 36415 COLL VENOUS BLD VENIPUNCTURE: CPT

## 2025-03-14 ENCOUNTER — HOSPITAL ENCOUNTER (OUTPATIENT)
Dept: RADIATION ONCOLOGY | Facility: MEDICAL CENTER | Age: 70
End: 2025-03-14
Payer: MEDICARE

## 2025-03-14 LAB
CHEMOTHERAPY INFUSION START DATE: NORMAL
CHEMOTHERAPY RECORDS: 2.4 GY
CHEMOTHERAPY RECORDS: 6000 CGY
CHEMOTHERAPY RECORDS: NORMAL
CHEMOTHERAPY RX CANCER: NORMAL
DATE 1ST CHEMO CANCER: NORMAL
RAD ONC ARIA COURSE LAST TREATMENT DATE: NORMAL
RAD ONC ARIA COURSE TREATMENT ELAPSED DAYS: NORMAL
RAD ONC ARIA REFERENCE POINT DOSAGE GIVEN TO DATE: 24 GY
RAD ONC ARIA REFERENCE POINT ID: NORMAL
RAD ONC ARIA REFERENCE POINT SESSION DOSAGE GIVEN: 2.4 GY

## 2025-03-14 PROCEDURE — 77014 PR CT GUIDANCE PLACEMENT RAD THERAPY FIELDS: CPT | Mod: 26 | Performed by: RADIOLOGY

## 2025-03-14 PROCEDURE — 77427 RADIATION TX MANAGEMENT X5: CPT | Performed by: RADIOLOGY

## 2025-03-14 PROCEDURE — 77386 HCHG IMRT DELIVERY COMPLEX: CPT | Performed by: RADIOLOGY

## 2025-03-14 NOTE — PROGRESS NOTES
"Pharmacy Chemotherapy Calculations    Dx: Cervical cancer    Cycle 3  Previous treatment = C2 3/10/25    Protocol: Cisplatin + XRT   Cisplatin 40mg/m2 IV over 60 min on day 1  Weekly x 6 cycles with concurrent radiation  NCCN Guidelines for Cervical Cancer V.3.2025  Janee HM, et al- N Engl J Med. 1999 Apr 15;340(15):1159-61.  Leann PG et al - J Clin Oncol. 2007 Jul 1;25(19):4194-10.    Allergies:  Morphine and Morphine       BP (!) 141/70   Pulse 99   Temp 36.2 °C (97.1 °F) (Temporal)   Resp 18   Ht 1.54 m (5' 0.63\")   Wt 91.4 kg (201 lb 8 oz)   SpO2 98%   BMI 38.54 kg/m²  Body surface area is 1.98 meters squared.    Labs 3/17/25:  ANC~ 4730 Plt = 71k   Hgb = 7.3     SCr = 1.17 mg/dL CrCl ~ 65 mL/min   K = 3.5  Mag = 0.9  *per Dr Silverman- replace Mag, tranfuse, and proceed with treatment today. 3/17/25      Cisplatin 40 mg/m² x 1.98 m² = 79.2 mg   <10% difference, OK to treat with final dose = 80 mg       Zayra Padilla, PharmD  "

## 2025-03-15 NOTE — PROGRESS NOTES
"Pharmacy Chemotherapy Calculation:    Dx: Cervical Cancer, Stage IIIC         Protocol: Weekly CISplatin + XRT      *Dosing Reference*  CISplatin 40 mg/m2 IV over 60 min on Day 1   Repeat every 7 days x 6 cycles with concurrent XRT     NCCNGuidelines® for Cervical Cancer V.3.2025.   Janee HM, et al. N Engl J Med. 1999;340(15):1154-61.  Leann PG, et al. J Clin Oncol. 2007;25(19):2804-10.    Allergies:  Morphine     BP (!) 141/70   Pulse 99   Temp 36.2 °C (97.1 °F) (Temporal)   Resp 18   Ht 1.54 m (5' 0.63\")   Wt 91.4 kg (201 lb 8 oz)   SpO2 98%   BMI 38.54 kg/m²  Body surface area is 1.98 meters squared.    Labs 3/17/25:  ANC~ 4730 Plt = 71 k   Hgb = 7.3     SCr = 1.17 mg/dL CrCl ~ 65 mL/min   Mag = 0.9 (Mag 4g IV x 1) K+ = 3.5  *ok to proceed with Cycle 3 CISplatin per Dr. Silverman       Drug Order   (Drug name, dose, route, IV Fluid & volume, frequency, number of doses) Cycle 3  Previous treatment:C2 3/10/25 s/p 5 cycles Cis/etoposide last 1/29-1/31/25     Medication = CISplatin  Base Dose = 40 mg/m2  Calc Dose: Base Dose x 1.98 m² = 79.2 mg  Final Dose = 80 mg  Route = IV  Fluid & Volume =  mL  Admin Duration = Over 60 min           <10% difference, okay to treat with final dose       By my signature below, I confirm this process was performed independently with the BSA and all final chemotherapy dosing calculations congruent. I have reviewed the above chemotherapy order and that my calculation of the final dose and BSA (when applicable) corroborate those calculations of the  pharmacist. Discrepancies of 10% or greater in the written dose have been addressed and documented within the [facility] Progress notes.    Martha Lara, PharmD    "

## 2025-03-17 ENCOUNTER — HOSPITAL ENCOUNTER (OUTPATIENT)
Dept: RADIATION ONCOLOGY | Facility: MEDICAL CENTER | Age: 70
End: 2025-03-17

## 2025-03-17 ENCOUNTER — OUTPATIENT INFUSION SERVICES (OUTPATIENT)
Dept: ONCOLOGY | Facility: MEDICAL CENTER | Age: 70
End: 2025-03-17
Attending: SPECIALIST
Payer: MEDICARE

## 2025-03-17 VITALS
TEMPERATURE: 97.2 F | RESPIRATION RATE: 18 BRPM | DIASTOLIC BLOOD PRESSURE: 74 MMHG | SYSTOLIC BLOOD PRESSURE: 153 MMHG | BODY MASS INDEX: 38.04 KG/M2 | WEIGHT: 201.5 LBS | OXYGEN SATURATION: 98 % | HEIGHT: 61 IN | HEART RATE: 79 BPM

## 2025-03-17 DIAGNOSIS — C53.8 MALIGNANT NEOPLASM OVERLAPPING CERVIX UTERI SITE (HCC): ICD-10-CM

## 2025-03-17 LAB
ABO GROUP BLD: NORMAL
ANION GAP SERPL CALC-SCNC: 10 MMOL/L (ref 7–16)
BARCODED ABORH UBTYP: 600
BARCODED ABORH UBTYP: 600
BARCODED PRD CODE UBPRD: NORMAL
BARCODED PRD CODE UBPRD: NORMAL
BARCODED UNIT NUM UBUNT: NORMAL
BARCODED UNIT NUM UBUNT: NORMAL
BASOPHILS # BLD AUTO: 0.2 % (ref 0–1.8)
BASOPHILS # BLD: 0.01 K/UL (ref 0–0.12)
BLD GP AB SCN SERPL QL: NORMAL
BUN SERPL-MCNC: 27 MG/DL (ref 8–22)
CALCIUM SERPL-MCNC: 9.1 MG/DL (ref 8.5–10.5)
CHEMOTHERAPY INFUSION START DATE: NORMAL
CHEMOTHERAPY RECORDS: 2.4 GY
CHEMOTHERAPY RECORDS: 6000 CGY
CHEMOTHERAPY RECORDS: NORMAL
CHEMOTHERAPY RX CANCER: NORMAL
CHLORIDE SERPL-SCNC: 104 MMOL/L (ref 96–112)
CO2 SERPL-SCNC: 25 MMOL/L (ref 20–33)
COMPONENT R 8504R: NORMAL
COMPONENT R 8504R: NORMAL
CREAT SERPL-MCNC: 1.17 MG/DL (ref 0.5–1.4)
DATE 1ST CHEMO CANCER: NORMAL
EOSINOPHIL # BLD AUTO: 0.04 K/UL (ref 0–0.51)
EOSINOPHIL NFR BLD: 0.7 % (ref 0–6.9)
ERYTHROCYTE [DISTWIDTH] IN BLOOD BY AUTOMATED COUNT: 48.2 FL (ref 35.9–50)
GFR SERPLBLD CREATININE-BSD FMLA CKD-EPI: 50 ML/MIN/1.73 M 2
GLUCOSE SERPL-MCNC: 194 MG/DL (ref 65–99)
HCT VFR BLD AUTO: 21.2 % (ref 37–47)
HGB BLD-MCNC: 7.3 G/DL (ref 12–16)
IMM GRANULOCYTES # BLD AUTO: 0.03 K/UL (ref 0–0.11)
IMM GRANULOCYTES NFR BLD AUTO: 0.5 % (ref 0–0.9)
LYMPHOCYTES # BLD AUTO: 0.51 K/UL (ref 1–4.8)
LYMPHOCYTES NFR BLD: 8.9 % (ref 22–41)
MAGNESIUM SERPL-MCNC: 0.9 MG/DL (ref 1.5–2.5)
MCH RBC QN AUTO: 27.9 PG (ref 27–33)
MCHC RBC AUTO-ENTMCNC: 34.4 G/DL (ref 32.2–35.5)
MCV RBC AUTO: 80.9 FL (ref 81.4–97.8)
MONOCYTES # BLD AUTO: 0.44 K/UL (ref 0–0.85)
MONOCYTES NFR BLD AUTO: 7.6 % (ref 0–13.4)
NEUTROPHILS # BLD AUTO: 4.73 K/UL (ref 1.82–7.42)
NEUTROPHILS NFR BLD: 82.1 % (ref 44–72)
NRBC # BLD AUTO: 0 K/UL
NRBC BLD-RTO: 0 /100 WBC (ref 0–0.2)
OUTPT INFUS CBC COMMENT OICOM: ABNORMAL
PLATELET # BLD AUTO: 71 K/UL (ref 164–446)
PLATELETS.RETICULATED NFR BLD AUTO: 4.5 % (ref 0.6–13.1)
POTASSIUM SERPL-SCNC: 3.5 MMOL/L (ref 3.6–5.5)
PRODUCT TYPE UPROD: NORMAL
PRODUCT TYPE UPROD: NORMAL
RAD ONC ARIA COURSE LAST TREATMENT DATE: NORMAL
RAD ONC ARIA COURSE TREATMENT ELAPSED DAYS: NORMAL
RAD ONC ARIA REFERENCE POINT DOSAGE GIVEN TO DATE: 26.4 GY
RAD ONC ARIA REFERENCE POINT ID: NORMAL
RAD ONC ARIA REFERENCE POINT SESSION DOSAGE GIVEN: 2.4 GY
RBC # BLD AUTO: 2.62 M/UL (ref 4.2–5.4)
RH BLD: NORMAL
SODIUM SERPL-SCNC: 139 MMOL/L (ref 135–145)
UNIT STATUS USTAT: NORMAL
UNIT STATUS USTAT: NORMAL
WBC # BLD AUTO: 5.8 K/UL (ref 4.8–10.8)

## 2025-03-17 PROCEDURE — 86901 BLOOD TYPING SEROLOGIC RH(D): CPT

## 2025-03-17 PROCEDURE — 96368 THER/DIAG CONCURRENT INF: CPT

## 2025-03-17 PROCEDURE — 86923 COMPATIBILITY TEST ELECTRIC: CPT

## 2025-03-17 PROCEDURE — 80048 BASIC METABOLIC PNL TOTAL CA: CPT

## 2025-03-17 PROCEDURE — 85025 COMPLETE CBC W/AUTO DIFF WBC: CPT

## 2025-03-17 PROCEDURE — P9016 RBC LEUKOCYTES REDUCED: HCPCS | Mod: 91

## 2025-03-17 PROCEDURE — 96361 HYDRATE IV INFUSION ADD-ON: CPT

## 2025-03-17 PROCEDURE — 700111 HCHG RX REV CODE 636 W/ 250 OVERRIDE (IP)

## 2025-03-17 PROCEDURE — 96375 TX/PRO/DX INJ NEW DRUG ADDON: CPT

## 2025-03-17 PROCEDURE — 700111 HCHG RX REV CODE 636 W/ 250 OVERRIDE (IP): Mod: JZ

## 2025-03-17 PROCEDURE — 83735 ASSAY OF MAGNESIUM: CPT

## 2025-03-17 PROCEDURE — 77386 HCHG IMRT DELIVERY COMPLEX: CPT | Performed by: RADIOLOGY

## 2025-03-17 PROCEDURE — 36430 TRANSFUSION BLD/BLD COMPNT: CPT

## 2025-03-17 PROCEDURE — 306780 HCHG STAT FOR TRANSFUSION PER CASE

## 2025-03-17 PROCEDURE — 77014 PR CT GUIDANCE PLACEMENT RAD THERAPY FIELDS: CPT | Mod: 26 | Performed by: RADIOLOGY

## 2025-03-17 PROCEDURE — A4212 NON CORING NEEDLE OR STYLET: HCPCS

## 2025-03-17 PROCEDURE — 86850 RBC ANTIBODY SCREEN: CPT

## 2025-03-17 PROCEDURE — 96367 TX/PROPH/DG ADDL SEQ IV INF: CPT

## 2025-03-17 PROCEDURE — 86900 BLOOD TYPING SEROLOGIC ABO: CPT

## 2025-03-17 PROCEDURE — 85055 RETICULATED PLATELET ASSAY: CPT

## 2025-03-17 PROCEDURE — A9270 NON-COVERED ITEM OR SERVICE: HCPCS

## 2025-03-17 PROCEDURE — 700105 HCHG RX REV CODE 258

## 2025-03-17 PROCEDURE — 96413 CHEMO IV INFUSION 1 HR: CPT

## 2025-03-17 PROCEDURE — 700102 HCHG RX REV CODE 250 W/ 637 OVERRIDE(OP)

## 2025-03-17 RX ORDER — ACETAMINOPHEN 325 MG/1
650 TABLET ORAL ONCE
Status: CANCELLED | OUTPATIENT
Start: 2025-03-17

## 2025-03-17 RX ORDER — DIPHENHYDRAMINE HCL 25 MG
25 TABLET ORAL ONCE
Status: CANCELLED | OUTPATIENT
Start: 2025-03-17 | End: 2025-03-17

## 2025-03-17 RX ORDER — DIPHENHYDRAMINE HYDROCHLORIDE 50 MG/ML
25 INJECTION, SOLUTION INTRAMUSCULAR; INTRAVENOUS PRN
Status: CANCELLED | OUTPATIENT
Start: 2025-03-17

## 2025-03-17 RX ORDER — DIPHENHYDRAMINE HCL 25 MG
25 TABLET ORAL ONCE
Status: COMPLETED | OUTPATIENT
Start: 2025-03-17 | End: 2025-03-17

## 2025-03-17 RX ORDER — ACETAMINOPHEN 325 MG/1
650 TABLET ORAL PRN
Status: CANCELLED | OUTPATIENT
Start: 2025-03-17

## 2025-03-17 RX ORDER — MAGNESIUM SULFATE HEPTAHYDRATE 40 MG/ML
4 INJECTION, SOLUTION INTRAVENOUS ONCE
Status: COMPLETED | OUTPATIENT
Start: 2025-03-17 | End: 2025-03-17

## 2025-03-17 RX ORDER — DEXAMETHASONE SODIUM PHOSPHATE 4 MG/ML
12 INJECTION, SOLUTION INTRA-ARTICULAR; INTRALESIONAL; INTRAMUSCULAR; INTRAVENOUS; SOFT TISSUE ONCE
Status: COMPLETED | OUTPATIENT
Start: 2025-03-17 | End: 2025-03-17

## 2025-03-17 RX ORDER — HYDROCORTISONE SODIUM SUCCINATE 100 MG/2ML
100 INJECTION INTRAMUSCULAR; INTRAVENOUS PRN
Status: CANCELLED | OUTPATIENT
Start: 2025-03-17

## 2025-03-17 RX ORDER — PALONOSETRON 0.05 MG/ML
0.25 INJECTION, SOLUTION INTRAVENOUS ONCE
Status: COMPLETED | OUTPATIENT
Start: 2025-03-17 | End: 2025-03-17

## 2025-03-17 RX ORDER — SODIUM CHLORIDE 9 MG/ML
500 INJECTION, SOLUTION INTRAVENOUS ONCE
Status: COMPLETED | OUTPATIENT
Start: 2025-03-17 | End: 2025-03-17

## 2025-03-17 RX ORDER — ACETAMINOPHEN 325 MG/1
650 TABLET ORAL ONCE
Status: COMPLETED | OUTPATIENT
Start: 2025-03-17 | End: 2025-03-17

## 2025-03-17 RX ADMIN — DEXAMETHASONE SODIUM PHOSPHATE 12 MG: 4 INJECTION INTRA-ARTICULAR; INTRALESIONAL; INTRAMUSCULAR; INTRAVENOUS; SOFT TISSUE at 10:52

## 2025-03-17 RX ADMIN — POTASSIUM CHLORIDE: 2 INJECTION, SOLUTION, CONCENTRATE INTRAVENOUS at 12:30

## 2025-03-17 RX ADMIN — FOSAPREPITANT 150 MG: 150 INJECTION, POWDER, LYOPHILIZED, FOR SOLUTION INTRAVENOUS at 10:51

## 2025-03-17 RX ADMIN — DIPHENHYDRAMINE HYDROCHLORIDE 25 MG: 25 TABLET ORAL at 10:57

## 2025-03-17 RX ADMIN — CISPLATIN 80 MG: 1 INJECTION, SOLUTION INTRAVENOUS at 11:20

## 2025-03-17 RX ADMIN — MAGNESIUM SULFATE HEPTAHYDRATE 4 G: 4 INJECTION, SOLUTION INTRAVENOUS at 11:23

## 2025-03-17 RX ADMIN — SODIUM CHLORIDE 500 ML: 9 INJECTION, SOLUTION INTRAVENOUS at 09:55

## 2025-03-17 RX ADMIN — PALONOSETRON HYDROCHLORIDE 0.25 MG: 0.25 INJECTION INTRAVENOUS at 10:48

## 2025-03-17 RX ADMIN — ACETAMINOPHEN 650 MG: 325 TABLET ORAL at 10:57

## 2025-03-17 ASSESSMENT — FIBROSIS 4 INDEX: FIB4 SCORE: 3.33

## 2025-03-17 NOTE — PROGRESS NOTES
Chemotherapy Verification - SECONDARY RN       Height = 154 cm  Weight = 91.4 kg  BSA = 1.98 m2       Medication: Cisplatin  Dose: 40 mg/m2  Calculated Dose: 79.2 mg                             (In mg/m2, AUC, mg/kg)       I confirm that this process was performed independently.

## 2025-03-17 NOTE — PROGRESS NOTES
Chemotherapy Verification - PRIMARY RN      Height = 1.54m  Weight = 91.4kg  BSA = 1.98m2       Medication: CISplatin (Platinol)  Dose: 40mg/m2  Calculated Dose: 79.2mg                             (In mg/m2, AUC, mg/kg)     I confirm this process was performed independently with the BSA and all final chemotherapy dosing calculations congruent.  Any discrepancies of 10% or greater have been addressed with the chemotherapy pharmacist. The resolution of the discrepancy has been documented in the EPIC progress notes.

## 2025-03-18 ENCOUNTER — HOSPITAL ENCOUNTER (OUTPATIENT)
Dept: RADIATION ONCOLOGY | Facility: MEDICAL CENTER | Age: 70
End: 2025-03-18
Payer: MEDICARE

## 2025-03-18 ENCOUNTER — APPOINTMENT (OUTPATIENT)
Dept: ADMISSIONS | Facility: MEDICAL CENTER | Age: 70
End: 2025-03-18
Payer: MEDICARE

## 2025-03-18 DIAGNOSIS — E83.42 HYPOMAGNESEMIA: ICD-10-CM

## 2025-03-18 DIAGNOSIS — D64.9 ANEMIA, UNSPECIFIED TYPE: ICD-10-CM

## 2025-03-18 LAB
CHEMOTHERAPY INFUSION START DATE: NORMAL
CHEMOTHERAPY RECORDS: 2.4 GY
CHEMOTHERAPY RECORDS: 6000 CGY
CHEMOTHERAPY RECORDS: NORMAL
CHEMOTHERAPY RX CANCER: NORMAL
DATE 1ST CHEMO CANCER: NORMAL
RAD ONC ARIA COURSE LAST TREATMENT DATE: NORMAL
RAD ONC ARIA COURSE TREATMENT ELAPSED DAYS: NORMAL
RAD ONC ARIA REFERENCE POINT DOSAGE GIVEN TO DATE: 28.8 GY
RAD ONC ARIA REFERENCE POINT ID: NORMAL
RAD ONC ARIA REFERENCE POINT SESSION DOSAGE GIVEN: 2.4 GY

## 2025-03-18 PROCEDURE — 77386 HCHG IMRT DELIVERY COMPLEX: CPT | Performed by: RADIOLOGY

## 2025-03-18 PROCEDURE — 77014 PR CT GUIDANCE PLACEMENT RAD THERAPY FIELDS: CPT | Mod: 26 | Performed by: RADIOLOGY

## 2025-03-18 NOTE — PROGRESS NOTES
Patient came into infusion independently. Order and vitals reviewed, assessment done. Port accessed in sterile manner with blood return noted. Labs collected and reviewed.   Critical magnesium report to Caroline NUNEZ at providers office. Orders received to give 4g of magnesium, 2 units of PRBCs and proceed with treatment today.   Cycle 3 day 1 of Cisplatin treatment given as ordered with no adverse events. 4g magnesium infused as ordered. Patient refused oral potassium.    Pre-medications for transfusion given and consent signed. Both units of blood product started as 120mL/hr and increased per [facility] protocol. Both units of blood product were given and completed with no adverse events. Port flushed per [facility] policy and de-accessed. Patient left in stable condition to [facility] with next appointment confirmed.

## 2025-03-19 ENCOUNTER — HOSPITAL ENCOUNTER (OUTPATIENT)
Dept: RADIATION ONCOLOGY | Facility: MEDICAL CENTER | Age: 70
End: 2025-03-19
Attending: RADIOLOGY
Payer: MEDICARE

## 2025-03-19 ENCOUNTER — HOSPITAL ENCOUNTER (OUTPATIENT)
Dept: RADIATION ONCOLOGY | Facility: MEDICAL CENTER | Age: 70
End: 2025-03-19
Payer: MEDICARE

## 2025-03-19 VITALS
OXYGEN SATURATION: 98 % | DIASTOLIC BLOOD PRESSURE: 85 MMHG | TEMPERATURE: 98.3 F | BODY MASS INDEX: 39.4 KG/M2 | HEART RATE: 70 BPM | SYSTOLIC BLOOD PRESSURE: 173 MMHG | WEIGHT: 206 LBS

## 2025-03-19 LAB
CHEMOTHERAPY INFUSION START DATE: NORMAL
CHEMOTHERAPY RECORDS: 2.4 GY
CHEMOTHERAPY RECORDS: 6000 CGY
CHEMOTHERAPY RECORDS: NORMAL
CHEMOTHERAPY RX CANCER: NORMAL
DATE 1ST CHEMO CANCER: NORMAL
RAD ONC ARIA COURSE LAST TREATMENT DATE: NORMAL
RAD ONC ARIA COURSE TREATMENT ELAPSED DAYS: NORMAL
RAD ONC ARIA REFERENCE POINT DOSAGE GIVEN TO DATE: 31.2 GY
RAD ONC ARIA REFERENCE POINT ID: NORMAL
RAD ONC ARIA REFERENCE POINT SESSION DOSAGE GIVEN: 2.4 GY

## 2025-03-19 PROCEDURE — 77386 HCHG IMRT DELIVERY COMPLEX: CPT | Performed by: RADIOLOGY

## 2025-03-19 PROCEDURE — 77336 RADIATION PHYSICS CONSULT: CPT | Performed by: RADIOLOGY

## 2025-03-19 PROCEDURE — 77014 PR CT GUIDANCE PLACEMENT RAD THERAPY FIELDS: CPT | Mod: 26 | Performed by: RADIOLOGY

## 2025-03-19 RX ORDER — 0.9 % SODIUM CHLORIDE 0.9 %
10 VIAL (ML) INJECTION PRN
Status: CANCELLED | OUTPATIENT
Start: 2025-03-31

## 2025-03-19 RX ORDER — DEXAMETHASONE SODIUM PHOSPHATE 4 MG/ML
12 INJECTION, SOLUTION INTRA-ARTICULAR; INTRALESIONAL; INTRAMUSCULAR; INTRAVENOUS; SOFT TISSUE ONCE
Status: CANCELLED | OUTPATIENT
Start: 2025-03-31 | End: 2025-03-31

## 2025-03-19 RX ORDER — 0.9 % SODIUM CHLORIDE 0.9 %
VIAL (ML) INJECTION PRN
Status: CANCELLED | OUTPATIENT
Start: 2025-03-31

## 2025-03-19 RX ORDER — EPINEPHRINE 1 MG/ML(1)
0.5 AMPUL (ML) INJECTION PRN
Status: CANCELLED | OUTPATIENT
Start: 2025-03-31

## 2025-03-19 RX ORDER — 0.9 % SODIUM CHLORIDE 0.9 %
10 VIAL (ML) INJECTION PRN
Status: CANCELLED | OUTPATIENT
Start: 2025-03-30

## 2025-03-19 RX ORDER — ONDANSETRON 8 MG/1
8 TABLET, ORALLY DISINTEGRATING ORAL PRN
Status: CANCELLED | OUTPATIENT
Start: 2025-03-31

## 2025-03-19 RX ORDER — ONDANSETRON 2 MG/ML
4 INJECTION INTRAMUSCULAR; INTRAVENOUS PRN
Status: CANCELLED | OUTPATIENT
Start: 2025-03-31

## 2025-03-19 RX ORDER — PALONOSETRON 0.05 MG/ML
0.25 INJECTION, SOLUTION INTRAVENOUS ONCE
Status: CANCELLED | OUTPATIENT
Start: 2025-03-31 | End: 2025-03-31

## 2025-03-19 RX ORDER — 0.9 % SODIUM CHLORIDE 0.9 %
3 VIAL (ML) INJECTION PRN
Status: CANCELLED | OUTPATIENT
Start: 2025-03-31

## 2025-03-19 RX ORDER — SODIUM CHLORIDE 9 MG/ML
INJECTION, SOLUTION INTRAVENOUS CONTINUOUS
Status: CANCELLED | OUTPATIENT
Start: 2025-03-31

## 2025-03-19 RX ORDER — 0.9 % SODIUM CHLORIDE 0.9 %
3 VIAL (ML) INJECTION PRN
Status: CANCELLED | OUTPATIENT
Start: 2025-03-30

## 2025-03-19 RX ORDER — 0.9 % SODIUM CHLORIDE 0.9 %
VIAL (ML) INJECTION PRN
Status: CANCELLED | OUTPATIENT
Start: 2025-03-30

## 2025-03-19 RX ORDER — SODIUM CHLORIDE 9 MG/ML
500 INJECTION, SOLUTION INTRAVENOUS ONCE
Status: CANCELLED | OUTPATIENT
Start: 2025-03-31

## 2025-03-19 RX ORDER — PROCHLORPERAZINE MALEATE 10 MG
10 TABLET ORAL EVERY 6 HOURS PRN
Status: CANCELLED | OUTPATIENT
Start: 2025-03-31

## 2025-03-19 RX ORDER — METHYLPREDNISOLONE SODIUM SUCCINATE 125 MG/2ML
125 INJECTION, POWDER, LYOPHILIZED, FOR SOLUTION INTRAMUSCULAR; INTRAVENOUS PRN
Status: CANCELLED | OUTPATIENT
Start: 2025-03-31

## 2025-03-19 RX ORDER — DIPHENHYDRAMINE HYDROCHLORIDE 50 MG/ML
50 INJECTION, SOLUTION INTRAMUSCULAR; INTRAVENOUS PRN
Status: CANCELLED | OUTPATIENT
Start: 2025-03-31

## 2025-03-19 ASSESSMENT — PAIN SCALES - GENERAL: PAINLEVEL_OUTOF10: NO PAIN

## 2025-03-19 ASSESSMENT — FIBROSIS 4 INDEX: FIB4 SCORE: 7.28

## 2025-03-19 NOTE — ON TREATMENT VISIT
"  ON TREATMENT  NOTE  RADIATION ONCOLOGY DEPARTMENT    Patient name:  Diane Barrett    Primary Physician:  Brendan Cho M.D. MRN: [id number]  CSN: [id number]   Referring physician:  Ambrose Silverman M.D.   : 1955, 69 y.o.     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 on 2025  Histopathologic type: Small cell carcinoma, NOS  Stage prefix: Initial diagnosis  Para-aortic status: Positive  Para-aortic zackary method of assessment: PET  Histologic grade (G): G3  Histologic grading system: 3 grade system  P16 overexpression: Positive      TREATMENT SUMMARY:  Course First Treatment Date 2025  Course Last Treatment Date 2025  Radiation Therapy Episodes       Active Episodes       Radiation Therapy: IMRT (3/3/2025)                   Radiation Treatments         Plan Last Treated On Elapsed Days Fractions Treated Prescribed Fraction Dose (cGy) Prescribed Total Dose (cGy)    Pelvis 3/18/2025 15 @ 2025 12 of 25 240 6,000                  Reference Point Last Treated On Elapsed Days Most Recent Session Dose (cGy) Total Dose (cGy)    Pelvis 3/18/2025 15 @ 2025 240 2,880                               SUBJECTIVE:  Nausea.     VITAL SIGNS:      3/19/2025     9:13 AM 3/17/2025     5:50 PM 3/17/2025     4:30 PM 3/17/2025     4:08 PM 3/17/2025     2:57 PM 3/17/2025     2:30 PM 3/17/2025     9:06 AM   Vitals   SYSTOLIC 173 153 139 133 118 123 141   DIASTOLIC 85 74 66 71 65 65 70   Pulse 70 79 81 81 87 89 99   Temperature 36.8 °C (98.3 °F) 36.2 °C (97.2 °F) 36.3 °C (97.3 °F) 36.2 °C (97.2 °F) 36.1 °C (96.9 °F) 36.3 °C (97.4 °F) 36.2 °C (97.1 °F)   Respiration  18 18 18 18 18 18   Weight 206      201.5   Height       1.54 m (5' 0.63\")   BMI 39.4 kg/m2      38.54 kg/m2   Pulse Oximetry 98 % 98 % 97 " % 99 % 96 % 98 % 98 %     KPS: 80, Normal activity with effort; some signs or symptoms of disease (ECOG equivalent 1)  Encounter Vitals  Temperature: 36.8 °C (98.3 °F)  Temp src: Temporal  Blood Pressure : (!) 173/85  Pulse: 70  Pulse Oximetry: 98 %  Weight: 93.4 kg (206 lb)  Pain Score: No pain      3/19/2025     9:13 AM 3/12/2025    10:31 AM 3/5/2025     9:16 AM 2/14/2025    10:06 AM   Pain Assessment   Pain Score NO PAIN NO PAIN NO PAIN NO PAIN          PHYSICAL EXAM:  Physical Exam  Vitals and nursing note reviewed.   Constitutional:       Appearance: She is well-developed.   HENT:      Head: Normocephalic.   Skin:     General: Skin is warm and dry.      Findings: No erythema.   Neurological:      Mental Status: She is alert and oriented to person, place, and time.   Psychiatric:         Behavior: Behavior normal.         Thought Content: Thought content normal.         Judgment: Judgment normal.              3/19/2025     9:17 AM 3/12/2025    10:33 AM 3/5/2025     9:18 AM   Toxicity Assessment   Toxicity Assessment Female Pelvis Female Pelvis Female Pelvis   Fatigue (lethargy, malaise, asthenia) Moderate (e.g., decrease in performance status by 1 ECOG level or 20% Karnofsky) or causing difficulty performing some activities Increased fatigue over baseline, but not altering normal activities None   Radiation Dermatitis None None None   Anorexia Loss of appetite Oral intake significantly decreased Oral intake significantly decreased   Colitis None Abdominal pain with mucus and/or blood in stool None   Constipation None None None   Dehydration None Dry mucous membranes and/or diminished skin turgor None   Diarrhea w/o Colostomy None Increase of <4 stools/day over pre-treatment None   Flatulence Mild None None   Nausea Able to eat Oral intake significantly decreased Oral intake significantly decreased   Proctitis None Increased stool frequency, occasional blood-streaked stools or rectal discomfort (including  hemorrhoids) not requiring medication None   Vomiting None None None   RT - Pain due to RT None None None   Tumor Pain (onset or exacerbation of tumor pain due to treatment) None None None   Dysuria (painful urination) None None None   Urinary Frequency Normal Normal Normal   Urinary Urgency None None None   Bladder Spasms Absent Absent Absent   Incontinence None None None   Urinary Retention Normal Normal Normal   Vaginitis (not due to infection) None None None   Vaginal Bleeding None None None   Vaginal Dryness Normal Normal Normal       CURRENT MEDICATIONS:    Current Outpatient Medications:     acetaminophen (TYLENOL) 500 MG Tab, Take 500-1,000 mg by mouth every 6 hours as needed., Disp: , Rfl:     ibuprofen (MOTRIN) 200 MG Tab, Take 400 mg by mouth every 6 hours as needed., Disp: , Rfl:     ondansetron (ZOFRAN ODT) 4 MG TABLET DISPERSIBLE, Take 4 mg by mouth every 6 hours as needed for Nausea/Vomiting., Disp: , Rfl:     dexamethasone (DECADRON) 4 MG Tab, Take 4 mg by mouth 2 times a day as needed. For nausea from chemo, Disp: , Rfl:     traMADol (ULTRAM) 50 MG Tab, Take 1 tablet by mouth every 6 hours as needed for pain. (Patient not taking: Reported on 2/14/2025), Disp: 28 Tablet, Rfl: 0    LABORATORY DATA:   Lab Results   Component Value Date/Time    SODIUM 139 03/17/2025 09:20 AM    POTASSIUM 3.5 (L) 03/17/2025 09:20 AM    CHLORIDE 104 03/17/2025 09:20 AM    CO2 25 03/17/2025 09:20 AM    GLUCOSE 194 (H) 03/17/2025 09:20 AM    BUN 27 (H) 03/17/2025 09:20 AM    CREATININE 1.17 03/17/2025 09:20 AM       Lab Results   Component Value Date/Time    WBC 5.8 03/17/2025 09:20 AM    RBC 2.62 (L) 03/17/2025 09:20 AM    HEMOGLOBIN 7.3 (L) 03/17/2025 09:20 AM    HEMATOCRIT 21.2 (L) 03/17/2025 09:20 AM    MCV 80.9 (L) 03/17/2025 09:20 AM    MCH 27.9 03/17/2025 09:20 AM    MCHC 34.4 03/17/2025 09:20 AM    PLATELETCT 71 (L) 03/17/2025 09:20 AM         RADIOLOGY DATA:  MR-PELVIS-WITH & W/O AND SEQUENCES  Result Date:  2/17/2025  There is a 1.8 x 1.4 cm hypoenhancing mass in the anterior lower uterine segment, likely residual mass. Mild irregularity of the anterior uterine wall but no involvement of the vagina. There is a 1.7 x 2.1 cm lobulated nonenhancing mass in the left internal iliac region. 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 on 2/14/2025      PLAN:  No change in treatment plan    Disposition:  Treatment plan and imaging reviewed. Questions answered. Continue therapy outlined.     Ilda JEAN BAPTISTE M.D.    No orders of the defined types were placed in this encounter.

## 2025-03-19 NOTE — ADDENDUM NOTE
Encounter addended by: Ilda JEAN BAPTISTE M.D. on: 3/19/2025 9:34 AM   Actions taken: Clinical Note Signed

## 2025-03-20 ENCOUNTER — HOSPITAL ENCOUNTER (OUTPATIENT)
Dept: RADIATION ONCOLOGY | Facility: MEDICAL CENTER | Age: 70
End: 2025-03-20
Payer: MEDICARE

## 2025-03-20 LAB
CHEMOTHERAPY INFUSION START DATE: NORMAL
CHEMOTHERAPY RECORDS: 2.4 GY
CHEMOTHERAPY RECORDS: 6000 CGY
CHEMOTHERAPY RECORDS: NORMAL
CHEMOTHERAPY RX CANCER: NORMAL
DATE 1ST CHEMO CANCER: NORMAL
RAD ONC ARIA COURSE LAST TREATMENT DATE: NORMAL
RAD ONC ARIA COURSE TREATMENT ELAPSED DAYS: NORMAL
RAD ONC ARIA REFERENCE POINT DOSAGE GIVEN TO DATE: 33.6 GY
RAD ONC ARIA REFERENCE POINT ID: NORMAL
RAD ONC ARIA REFERENCE POINT SESSION DOSAGE GIVEN: 2.4 GY

## 2025-03-20 PROCEDURE — 77386 HCHG IMRT DELIVERY COMPLEX: CPT | Performed by: RADIOLOGY

## 2025-03-20 PROCEDURE — 77014 PR CT GUIDANCE PLACEMENT RAD THERAPY FIELDS: CPT | Mod: 26 | Performed by: RADIOLOGY

## 2025-03-21 ENCOUNTER — HOSPITAL ENCOUNTER (OUTPATIENT)
Dept: RADIATION ONCOLOGY | Facility: MEDICAL CENTER | Age: 70
End: 2025-03-21
Payer: MEDICARE

## 2025-03-21 LAB
CHEMOTHERAPY INFUSION START DATE: NORMAL
CHEMOTHERAPY RECORDS: 2.4 GY
CHEMOTHERAPY RECORDS: 6000 CGY
CHEMOTHERAPY RECORDS: NORMAL
CHEMOTHERAPY RX CANCER: NORMAL
DATE 1ST CHEMO CANCER: NORMAL
RAD ONC ARIA COURSE LAST TREATMENT DATE: NORMAL
RAD ONC ARIA COURSE TREATMENT ELAPSED DAYS: NORMAL
RAD ONC ARIA REFERENCE POINT DOSAGE GIVEN TO DATE: 36 GY
RAD ONC ARIA REFERENCE POINT ID: NORMAL
RAD ONC ARIA REFERENCE POINT SESSION DOSAGE GIVEN: 2.4 GY

## 2025-03-21 PROCEDURE — 77427 RADIATION TX MANAGEMENT X5: CPT | Performed by: RADIOLOGY

## 2025-03-21 PROCEDURE — 77014 PR CT GUIDANCE PLACEMENT RAD THERAPY FIELDS: CPT | Mod: 26 | Performed by: RADIOLOGY

## 2025-03-21 PROCEDURE — 77386 HCHG IMRT DELIVERY COMPLEX: CPT | Performed by: RADIOLOGY

## 2025-03-24 ENCOUNTER — HOSPITAL ENCOUNTER (OUTPATIENT)
Dept: RADIATION ONCOLOGY | Facility: MEDICAL CENTER | Age: 70
End: 2025-03-24

## 2025-03-24 ENCOUNTER — OUTPATIENT INFUSION SERVICES (OUTPATIENT)
Dept: ONCOLOGY | Facility: MEDICAL CENTER | Age: 70
End: 2025-03-24
Attending: SPECIALIST
Payer: MEDICARE

## 2025-03-24 ENCOUNTER — APPOINTMENT (OUTPATIENT)
Dept: ADMISSIONS | Facility: MEDICAL CENTER | Age: 70
End: 2025-03-24
Payer: MEDICARE

## 2025-03-24 VITALS
WEIGHT: 198.85 LBS | TEMPERATURE: 97.5 F | RESPIRATION RATE: 18 BRPM | DIASTOLIC BLOOD PRESSURE: 77 MMHG | OXYGEN SATURATION: 95 % | SYSTOLIC BLOOD PRESSURE: 137 MMHG | HEIGHT: 61 IN | BODY MASS INDEX: 37.54 KG/M2 | HEART RATE: 94 BPM

## 2025-03-24 DIAGNOSIS — C53.8 MALIGNANT NEOPLASM OVERLAPPING CERVIX UTERI SITE (HCC): ICD-10-CM

## 2025-03-24 LAB
ANION GAP SERPL CALC-SCNC: 13 MMOL/L (ref 7–16)
BASOPHILS # BLD AUTO: 0 % (ref 0–1.8)
BASOPHILS # BLD: 0 K/UL (ref 0–0.12)
BUN SERPL-MCNC: 23 MG/DL (ref 8–22)
CALCIUM SERPL-MCNC: 8.3 MG/DL (ref 8.5–10.5)
CHEMOTHERAPY INFUSION START DATE: NORMAL
CHEMOTHERAPY RECORDS: 2.4 GY
CHEMOTHERAPY RECORDS: 6000 CGY
CHEMOTHERAPY RECORDS: NORMAL
CHEMOTHERAPY RX CANCER: NORMAL
CHLORIDE SERPL-SCNC: 98 MMOL/L (ref 96–112)
CO2 SERPL-SCNC: 28 MMOL/L (ref 20–33)
CREAT SERPL-MCNC: 1.15 MG/DL (ref 0.5–1.4)
DATE 1ST CHEMO CANCER: NORMAL
EOSINOPHIL # BLD AUTO: 0.17 K/UL (ref 0–0.51)
EOSINOPHIL NFR BLD: 11 % (ref 0–6.9)
ERYTHROCYTE [DISTWIDTH] IN BLOOD BY AUTOMATED COUNT: 47.1 FL (ref 35.9–50)
GFR SERPLBLD CREATININE-BSD FMLA CKD-EPI: 51 ML/MIN/1.73 M 2
GLUCOSE SERPL-MCNC: 192 MG/DL (ref 65–99)
HCT VFR BLD AUTO: 25.8 % (ref 37–47)
HGB BLD-MCNC: 8.6 G/DL (ref 12–16)
IMM GRANULOCYTES # BLD AUTO: 0 K/UL (ref 0–0.11)
IMM GRANULOCYTES NFR BLD AUTO: 0 % (ref 0–0.9)
LYMPHOCYTES # BLD AUTO: 0.27 K/UL (ref 1–4.8)
LYMPHOCYTES NFR BLD: 17.5 % (ref 22–41)
MAGNESIUM SERPL-MCNC: 0.8 MG/DL (ref 1.5–2.5)
MCH RBC QN AUTO: 27.4 PG (ref 27–33)
MCHC RBC AUTO-ENTMCNC: 33.3 G/DL (ref 32.2–35.5)
MCV RBC AUTO: 82.2 FL (ref 81.4–97.8)
MONOCYTES # BLD AUTO: 0.13 K/UL (ref 0–0.85)
MONOCYTES NFR BLD AUTO: 8.4 % (ref 0–13.4)
NEUTROPHILS # BLD AUTO: 0.97 K/UL (ref 1.82–7.42)
NEUTROPHILS NFR BLD: 63.1 % (ref 44–72)
NRBC # BLD AUTO: 0 K/UL
NRBC BLD-RTO: 0 /100 WBC (ref 0–0.2)
OUTPT INFUS CBC COMMENT OICOM: ABNORMAL
PLATELET # BLD AUTO: 22 K/UL (ref 164–446)
PLATELETS.RETICULATED NFR BLD AUTO: 5.7 % (ref 0.6–13.1)
POTASSIUM SERPL-SCNC: 2.9 MMOL/L (ref 3.6–5.5)
RAD ONC ARIA COURSE LAST TREATMENT DATE: NORMAL
RAD ONC ARIA COURSE TREATMENT ELAPSED DAYS: NORMAL
RAD ONC ARIA REFERENCE POINT DOSAGE GIVEN TO DATE: 38.4 GY
RAD ONC ARIA REFERENCE POINT ID: NORMAL
RAD ONC ARIA REFERENCE POINT SESSION DOSAGE GIVEN: 2.4 GY
RBC # BLD AUTO: 3.14 M/UL (ref 4.2–5.4)
SODIUM SERPL-SCNC: 139 MMOL/L (ref 135–145)
WBC # BLD AUTO: 1.5 K/UL (ref 4.8–10.8)

## 2025-03-24 PROCEDURE — 96365 THER/PROPH/DIAG IV INF INIT: CPT

## 2025-03-24 PROCEDURE — 77014 PR CT GUIDANCE PLACEMENT RAD THERAPY FIELDS: CPT | Mod: 26 | Performed by: RADIOLOGY

## 2025-03-24 PROCEDURE — 96368 THER/DIAG CONCURRENT INF: CPT

## 2025-03-24 PROCEDURE — 96366 THER/PROPH/DIAG IV INF ADDON: CPT

## 2025-03-24 PROCEDURE — 85025 COMPLETE CBC W/AUTO DIFF WBC: CPT

## 2025-03-24 PROCEDURE — 77386 HCHG IMRT DELIVERY COMPLEX: CPT | Performed by: RADIOLOGY

## 2025-03-24 PROCEDURE — 80048 BASIC METABOLIC PNL TOTAL CA: CPT

## 2025-03-24 PROCEDURE — 83735 ASSAY OF MAGNESIUM: CPT

## 2025-03-24 PROCEDURE — 700111 HCHG RX REV CODE 636 W/ 250 OVERRIDE (IP): Performed by: SPECIALIST

## 2025-03-24 PROCEDURE — 85055 RETICULATED PLATELET ASSAY: CPT

## 2025-03-24 RX ORDER — MAGNESIUM SULFATE HEPTAHYDRATE 40 MG/ML
4 INJECTION, SOLUTION INTRAVENOUS ONCE
Status: COMPLETED | OUTPATIENT
Start: 2025-03-24 | End: 2025-03-24

## 2025-03-24 RX ORDER — POTASSIUM CHLORIDE 7.45 MG/ML
10 INJECTION INTRAVENOUS
Status: COMPLETED | OUTPATIENT
Start: 2025-03-24 | End: 2025-03-24

## 2025-03-24 RX ADMIN — POTASSIUM CHLORIDE 10 MEQ: 7.46 INJECTION, SOLUTION INTRAVENOUS at 13:38

## 2025-03-24 RX ADMIN — POTASSIUM CHLORIDE 10 MEQ: 7.46 INJECTION, SOLUTION INTRAVENOUS at 10:41

## 2025-03-24 RX ADMIN — POTASSIUM CHLORIDE 10 MEQ: 7.46 INJECTION, SOLUTION INTRAVENOUS at 11:36

## 2025-03-24 RX ADMIN — MAGNESIUM SULFATE HEPTAHYDRATE 4 G: 4 INJECTION, SOLUTION INTRAVENOUS at 10:41

## 2025-03-24 RX ADMIN — POTASSIUM CHLORIDE 10 MEQ: 7.46 INJECTION, SOLUTION INTRAVENOUS at 12:36

## 2025-03-24 ASSESSMENT — FIBROSIS 4 INDEX: FIB4 SCORE: 7.28

## 2025-03-24 NOTE — PROGRESS NOTES
"Pharmacy Chemotherapy Calculation:    Dx: Cervical cancer stage IIIC       Cycle 4 (cancelled due to labs)  Previous treatment = C3 3/17/25    Protocol: Weekly CISplatin + XRT      *Dosing Reference*  CISplatin 40 mg/m2 IV over 60 min on Day 1   Repeat every 7 days x 6 cycles with concurrent XRT   NCCNGuidelines® for Cervical Cancer V.3.2025.   Janee HM, et al. N Engl J Med. 1999;340(15):1154-61.  Leann PG, et al. J Clin Oncol. 2007;25(19):2804-10.    Allergies:  Morphine and Morphine       /77   Pulse 94   Temp 36.4 °C (97.5 °F) (Temporal)   Resp 18   Ht 1.54 m (5' 0.63\")   Wt 90.2 kg (198 lb 13.7 oz)   SpO2 95%   BMI 38.03 kg/m²  Body surface area is 1.96 meters squared.    Labs 3/24/25:  ANC~ 270 Plt = 22k   Hgb = 8.6     SCr = 1.15 mg/dL CrCl ~ 65.7 mL/min   Mag = 0.8  K+ = 2.9 (to be replaced by RN protocol)      Natalia Oswald, PharmD  "

## 2025-03-24 NOTE — PROGRESS NOTES
Pt arrives to [facility] for cycle 4 of weekly Cisplatin with concurrent radiation therapy.  Discussed plan of care with pt.  Pt denies s/sx of infection or pain today.  RUC port accessed with sterile technique, flushed with NS and brisk blood return observed.  Labs drawn.  Labs reviewed and results do not meet parameters for treatment.  , platelets 22,000 today.  Potassium is 2.9 and magnesium is 0.8 and will replace per electrolyte protocol.  Notified MAYRA Dwyer of lab results.  Received telephone order to cancel today's chemotherapy and ok to replace electrolytes per protocol.  Kcl 40meq IV given. Magnesium 4g IV given.  Port flushed with NS and saline locked per policy.  Shukla needle removed intact.  Site covered with gauze/tape.  Confirmed next appt on 3/31/2025 with pt.  Pt dc home to self care.

## 2025-03-25 ENCOUNTER — HOSPITAL ENCOUNTER (OUTPATIENT)
Dept: RADIATION ONCOLOGY | Facility: MEDICAL CENTER | Age: 70
End: 2025-03-25
Payer: MEDICARE

## 2025-03-25 ENCOUNTER — APPOINTMENT (OUTPATIENT)
Dept: ADMISSIONS | Facility: MEDICAL CENTER | Age: 70
End: 2025-03-25
Attending: RADIOLOGY
Payer: MEDICARE

## 2025-03-25 LAB
CHEMOTHERAPY INFUSION START DATE: NORMAL
CHEMOTHERAPY RECORDS: 2.4 GY
CHEMOTHERAPY RECORDS: 6000 CGY
CHEMOTHERAPY RECORDS: NORMAL
CHEMOTHERAPY RX CANCER: NORMAL
DATE 1ST CHEMO CANCER: NORMAL
RAD ONC ARIA COURSE LAST TREATMENT DATE: NORMAL
RAD ONC ARIA COURSE TREATMENT ELAPSED DAYS: NORMAL
RAD ONC ARIA REFERENCE POINT DOSAGE GIVEN TO DATE: 40.8 GY
RAD ONC ARIA REFERENCE POINT ID: NORMAL
RAD ONC ARIA REFERENCE POINT SESSION DOSAGE GIVEN: 2.4 GY

## 2025-03-25 PROCEDURE — 77386 HCHG IMRT DELIVERY COMPLEX: CPT | Performed by: RADIOLOGY

## 2025-03-25 PROCEDURE — 77014 PR CT GUIDANCE PLACEMENT RAD THERAPY FIELDS: CPT | Mod: 26 | Performed by: RADIOLOGY

## 2025-03-26 ENCOUNTER — HOSPITAL ENCOUNTER (OUTPATIENT)
Dept: LAB | Facility: MEDICAL CENTER | Age: 70
End: 2025-03-26
Attending: RADIOLOGY
Payer: MEDICARE

## 2025-03-26 ENCOUNTER — HOSPITAL ENCOUNTER (OUTPATIENT)
Dept: RADIATION ONCOLOGY | Facility: MEDICAL CENTER | Age: 70
End: 2025-03-26

## 2025-03-26 ENCOUNTER — PRE-ADMISSION TESTING (OUTPATIENT)
Dept: ADMISSIONS | Facility: MEDICAL CENTER | Age: 70
End: 2025-03-26
Attending: RADIOLOGY
Payer: MEDICARE

## 2025-03-26 ENCOUNTER — HOSPITAL ENCOUNTER (OUTPATIENT)
Dept: RADIATION ONCOLOGY | Facility: MEDICAL CENTER | Age: 70
End: 2025-03-26
Attending: RADIOLOGY
Payer: MEDICARE

## 2025-03-26 VITALS
BODY MASS INDEX: 37.99 KG/M2 | SYSTOLIC BLOOD PRESSURE: 150 MMHG | WEIGHT: 198.63 LBS | TEMPERATURE: 97 F | OXYGEN SATURATION: 97 % | HEART RATE: 98 BPM | DIASTOLIC BLOOD PRESSURE: 78 MMHG | RESPIRATION RATE: 18 BRPM

## 2025-03-26 DIAGNOSIS — C53.8 MALIGNANT NEOPLASM OVERLAPPING CERVIX UTERI SITE (HCC): ICD-10-CM

## 2025-03-26 LAB
BASOPHILS # BLD AUTO: 0 % (ref 0–1.8)
BASOPHILS # BLD: 0 K/UL (ref 0–0.12)
CHEMOTHERAPY INFUSION START DATE: NORMAL
CHEMOTHERAPY RECORDS: 2.4 GY
CHEMOTHERAPY RECORDS: 6000 CGY
CHEMOTHERAPY RECORDS: NORMAL
CHEMOTHERAPY RX CANCER: NORMAL
DATE 1ST CHEMO CANCER: NORMAL
EOSINOPHIL # BLD AUTO: 0.19 K/UL (ref 0–0.51)
EOSINOPHIL NFR BLD: 11 % (ref 0–6.9)
ERYTHROCYTE [DISTWIDTH] IN BLOOD BY AUTOMATED COUNT: 47.5 FL (ref 35.9–50)
HCT VFR BLD AUTO: 25.8 % (ref 37–47)
HGB BLD-MCNC: 8.6 G/DL (ref 12–16)
IMM GRANULOCYTES # BLD AUTO: 0 K/UL (ref 0–0.11)
IMM GRANULOCYTES NFR BLD AUTO: 0 % (ref 0–0.9)
LYMPHOCYTES # BLD AUTO: 0.29 K/UL (ref 1–4.8)
LYMPHOCYTES NFR BLD: 16.8 % (ref 22–41)
MCH RBC QN AUTO: 27.3 PG (ref 27–33)
MCHC RBC AUTO-ENTMCNC: 33.3 G/DL (ref 32.2–35.5)
MCV RBC AUTO: 81.9 FL (ref 81.4–97.8)
MONOCYTES # BLD AUTO: 0.17 K/UL (ref 0–0.85)
MONOCYTES NFR BLD AUTO: 9.8 % (ref 0–13.4)
NEUTROPHILS # BLD AUTO: 1.08 K/UL (ref 1.82–7.42)
NEUTROPHILS NFR BLD: 62.4 % (ref 44–72)
NRBC # BLD AUTO: 0 K/UL
NRBC BLD-RTO: 0 /100 WBC (ref 0–0.2)
PLATELET # BLD AUTO: 18 K/UL (ref 164–446)
PLATELET BLD QL SMEAR: NORMAL
PLATELETS.RETICULATED NFR BLD AUTO: 5.9 % (ref 0.6–13.1)
RAD ONC ARIA COURSE LAST TREATMENT DATE: NORMAL
RAD ONC ARIA COURSE TREATMENT ELAPSED DAYS: NORMAL
RAD ONC ARIA REFERENCE POINT DOSAGE GIVEN TO DATE: 43.2 GY
RAD ONC ARIA REFERENCE POINT ID: NORMAL
RAD ONC ARIA REFERENCE POINT SESSION DOSAGE GIVEN: 2.4 GY
RBC # BLD AUTO: 3.15 M/UL (ref 4.2–5.4)
WBC # BLD AUTO: 1.7 K/UL (ref 4.8–10.8)

## 2025-03-26 PROCEDURE — 77336 RADIATION PHYSICS CONSULT: CPT | Performed by: RADIOLOGY

## 2025-03-26 PROCEDURE — 77014 PR CT GUIDANCE PLACEMENT RAD THERAPY FIELDS: CPT | Mod: 26 | Performed by: RADIOLOGY

## 2025-03-26 PROCEDURE — 85025 COMPLETE CBC W/AUTO DIFF WBC: CPT

## 2025-03-26 PROCEDURE — 77386 HCHG IMRT DELIVERY COMPLEX: CPT | Performed by: RADIOLOGY

## 2025-03-26 PROCEDURE — 36415 COLL VENOUS BLD VENIPUNCTURE: CPT

## 2025-03-26 PROCEDURE — 85055 RETICULATED PLATELET ASSAY: CPT

## 2025-03-26 RX ORDER — 0.9 % SODIUM CHLORIDE 0.9 %
10 VIAL (ML) INJECTION PRN
Status: CANCELLED | OUTPATIENT
Start: 2025-03-31

## 2025-03-26 RX ORDER — DIPHENHYDRAMINE HCL 25 MG
25 TABLET ORAL ONCE
Status: CANCELLED | OUTPATIENT
Start: 2025-03-31 | End: 2025-03-31

## 2025-03-26 RX ORDER — DIPHENHYDRAMINE HYDROCHLORIDE 50 MG/ML
25 INJECTION, SOLUTION INTRAMUSCULAR; INTRAVENOUS PRN
Status: CANCELLED | OUTPATIENT
Start: 2025-03-31

## 2025-03-26 RX ORDER — HYDROCORTISONE SODIUM SUCCINATE 100 MG/2ML
100 INJECTION INTRAMUSCULAR; INTRAVENOUS PRN
Status: CANCELLED | OUTPATIENT
Start: 2025-03-31

## 2025-03-26 RX ORDER — LOPERAMIDE HYDROCHLORIDE 2 MG/1
2 CAPSULE ORAL 4 TIMES DAILY PRN
COMMUNITY

## 2025-03-26 RX ORDER — SODIUM CHLORIDE 9 MG/ML
INJECTION, SOLUTION INTRAVENOUS CONTINUOUS
Status: CANCELLED | OUTPATIENT
Start: 2025-03-31

## 2025-03-26 RX ORDER — HYDROCORTISONE SODIUM SUCCINATE 100 MG/2ML
100 INJECTION INTRAMUSCULAR; INTRAVENOUS ONCE
Status: CANCELLED | OUTPATIENT
Start: 2025-03-31 | End: 2025-03-31

## 2025-03-26 RX ORDER — ACETAMINOPHEN 325 MG/1
650 TABLET ORAL PRN
Status: CANCELLED | OUTPATIENT
Start: 2025-03-31

## 2025-03-26 RX ORDER — ACETAMINOPHEN 325 MG/1
650 TABLET ORAL ONCE
Status: CANCELLED | OUTPATIENT
Start: 2025-03-31

## 2025-03-26 RX ORDER — 0.9 % SODIUM CHLORIDE 0.9 %
VIAL (ML) INJECTION PRN
Status: CANCELLED | OUTPATIENT
Start: 2025-03-31

## 2025-03-26 RX ORDER — 0.9 % SODIUM CHLORIDE 0.9 %
3 VIAL (ML) INJECTION PRN
Status: CANCELLED | OUTPATIENT
Start: 2025-03-31

## 2025-03-26 ASSESSMENT — FIBROSIS 4 INDEX: FIB4 SCORE: 23.49

## 2025-03-26 ASSESSMENT — PAIN SCALES - GENERAL: PAINLEVEL_OUTOF10: NO PAIN

## 2025-03-26 NOTE — PREADMIT AVS NOTE
Current Medications   Medication Instructions    loperamide (IMODIUM) 2 MG Cap As needed medication, may take if needed, including morning of procedure     MAGNESIUM PO Stop 7 days before surgery    Cholecalciferol (VITAMIN D-3 PO) Stop 7 days before surgery    acetaminophen (TYLENOL) 500 MG Tab As needed medication, may take if needed, including morning of procedure     ibuprofen (MOTRIN) 200 MG Tab Stop 5 days before surgery    ondansetron (ZOFRAN ODT) 4 MG TABLET DISPERSIBLE As needed medication, may take if needed, including morning of procedure

## 2025-03-26 NOTE — ADDENDUM NOTE
Encounter addended by: Ilda JEAN BAPTISTE M.D. on: 3/26/2025 11:27 AM   Actions taken: Pend clinical note, SmartForm saved

## 2025-03-26 NOTE — ON TREATMENT VISIT
"  ON TREATMENT  NOTE  RADIATION ONCOLOGY DEPARTMENT    Patient name:  Diane Barrett    Primary Physician:  Brendan Cho M.D. MRN: [id number]  CSN: [id number]   Referring physician:  Ambrose Silverman M.D.   : 1955, 69 y.o.     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 on 2025  Histopathologic type: Small cell carcinoma, NOS  Stage prefix: Initial diagnosis  Para-aortic status: Positive  Para-aortic zackary method of assessment: PET  Histologic grade (G): G3  Histologic grading system: 3 grade system  P16 overexpression: Positive      TREATMENT SUMMARY:  Course First Treatment Date 2025  Course Last Treatment Date 2025  Radiation Therapy Episodes       Active Episodes       Radiation Therapy: IMRT (3/3/2025)                   Radiation Treatments         Plan Last Treated On Elapsed Days Fractions Treated Prescribed Fraction Dose (cGy) Prescribed Total Dose (cGy)    Pelvis 3/26/2025 23 @ 2025 18 of 25 240 6,000                  Reference Point Last Treated On Elapsed Days Most Recent Session Dose (cGy) Total Dose (cGy)    Pelvis 3/26/2025 23 @ 2025 240 4,320                               SUBJECTIVE:  Fatigue, nausea. Eating difficult.    VITAL SIGNS:      3/26/2025    10:06 AM 3/24/2025     9:18 AM 3/19/2025     9:13 AM 3/17/2025     5:50 PM 3/17/2025     4:30 PM 3/17/2025     4:08 PM 3/17/2025     2:57 PM   Vitals   SYSTOLIC 150 137 173 153 139 133 118   DIASTOLIC 78 77 85 74 66 71 65   Pulse 98 94 70 79 81 81 87   Temperature 36.1 °C (97 °F) 36.4 °C (97.5 °F) 36.8 °C (98.3 °F) 36.2 °C (97.2 °F) 36.3 °C (97.3 °F) 36.2 °C (97.2 °F) 36.1 °C (96.9 °F)   Respiration 18 18  18 18 18 18   Weight 198.63 198.86 206       Height  1.54 m (5' 0.63\")        BMI 37.99 kg/m2 38.03 kg/m2 " 39.4 kg/m2       Pulse Oximetry 97 % 95 % 98 % 98 % 97 % 99 % 96 %     KPS: 80, Normal activity with effort; some signs or symptoms of disease (ECOG equivalent 1)  Encounter Vitals  Temperature: 36.1 °C (97 °F)  Blood Pressure : (!) 150/78  Pulse: 98  Respiration: 18  Pulse Oximetry: 97 %  Weight: 90.1 kg (198 lb 10.2 oz)  Weight Source: Stand Up Scale  Pain Score: No pain      3/26/2025    10:06 AM 3/19/2025     9:13 AM 3/12/2025    10:31 AM 3/5/2025     9:16 AM 2/14/2025    10:06 AM   Pain Assessment   Pain Score NO PAIN NO PAIN NO PAIN NO PAIN NO PAIN          PHYSICAL EXAM:  Physical Exam  Vitals and nursing note reviewed.   Constitutional:       Appearance: She is well-developed.   HENT:      Head: Normocephalic.   Skin:     General: Skin is warm and dry.      Findings: No erythema.   Neurological:      Mental Status: She is alert and oriented to person, place, and time.   Psychiatric:         Behavior: Behavior normal.         Thought Content: Thought content normal.         Judgment: Judgment normal.              3/26/2025    10:11 AM 3/19/2025     9:17 AM 3/12/2025    10:33 AM 3/5/2025     9:18 AM   Toxicity Assessment   Toxicity Assessment Female Pelvis Female Pelvis Female Pelvis Female Pelvis   Fatigue (lethargy, malaise, asthenia) Moderate (e.g., decrease in performance status by 1 ECOG level or 20% Karnofsky) or causing difficulty performing some activities Moderate (e.g., decrease in performance status by 1 ECOG level or 20% Karnofsky) or causing difficulty performing some activities Increased fatigue over baseline, but not altering normal activities None   Radiation Dermatitis None None None None   Anorexia Oral intake significantly decreased Loss of appetite Oral intake significantly decreased Oral intake significantly decreased   Colitis None None Abdominal pain with mucus and/or blood in stool None   Constipation None None None None   Dehydration None None Dry mucous membranes and/or diminished  skin turgor None   Diarrhea w/o Colostomy Increase of <4 stools/day over pre-treatment None Increase of <4 stools/day over pre-treatment None   Flatulence None Mild None None   Nausea Oral intake significantly decreased Able to eat Oral intake significantly decreased Oral intake significantly decreased   Proctitis None None Increased stool frequency, occasional blood-streaked stools or rectal discomfort (including hemorrhoids) not requiring medication None   Vomiting None None None None   RT - Pain due to RT None None None None   Tumor Pain (onset or exacerbation of tumor pain due to treatment) None None None None   Dysuria (painful urination) None None None None   Urinary Frequency Increase in frequency up to 2x normal Normal Normal Normal   Urinary Urgency Present None None None   Bladder Spasms Absent Absent Absent Absent   Incontinence None None None None   Urinary Retention Normal Normal Normal Normal   Vaginitis (not due to infection) None None None None   Vaginal Bleeding None None None None   Vaginal Dryness Normal Normal Normal Normal       CURRENT MEDICATIONS:    Current Outpatient Medications:     acetaminophen (TYLENOL) 500 MG Tab, Take 500-1,000 mg by mouth every 6 hours as needed., Disp: , Rfl:     ibuprofen (MOTRIN) 200 MG Tab, Take 400 mg by mouth every 6 hours as needed., Disp: , Rfl:     ondansetron (ZOFRAN ODT) 4 MG TABLET DISPERSIBLE, Take 4 mg by mouth every 6 hours as needed for Nausea/Vomiting., Disp: , Rfl:     dexamethasone (DECADRON) 4 MG Tab, Take 4 mg by mouth 2 times a day as needed. For nausea from chemo, Disp: , Rfl:     traMADol (ULTRAM) 50 MG Tab, Take 1 tablet by mouth every 6 hours as needed for pain. (Patient not taking: Reported on 2/14/2025), Disp: 28 Tablet, Rfl: 0    LABORATORY DATA:   Lab Results   Component Value Date/Time    SODIUM 139 03/24/2025 09:30 AM    POTASSIUM 2.9 (L) 03/24/2025 09:30 AM    CHLORIDE 98 03/24/2025 09:30 AM    CO2 28 03/24/2025 09:30 AM    GLUCOSE 192  (H) 03/24/2025 09:30 AM    BUN 23 (H) 03/24/2025 09:30 AM    CREATININE 1.15 03/24/2025 09:30 AM       Lab Results   Component Value Date/Time    WBC 1.7 (LL) 03/26/2025 10:48 AM    RBC 3.15 (L) 03/26/2025 10:48 AM    HEMOGLOBIN 8.6 (L) 03/26/2025 10:48 AM    HEMATOCRIT 25.8 (L) 03/26/2025 10:48 AM    MCV 81.9 03/26/2025 10:48 AM    MCH 27.3 03/26/2025 10:48 AM    MCHC 33.3 03/26/2025 10:48 AM    PLATELETCT 18 (LL) 03/26/2025 10:48 AM         RADIOLOGY DATA:  MR-PELVIS-WITH & W/O AND SEQUENCES  Result Date: 2/17/2025  There is a 1.8 x 1.4 cm hypoenhancing mass in the anterior lower uterine segment, likely residual mass. Mild irregularity of the anterior uterine wall but no involvement of the vagina. There is a 1.7 x 2.1 cm lobulated nonenhancing mass in the left internal iliac region. 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 on 2/14/2025      PLAN:  No change in treatment plan  Recheck CBC today.  If platelets are low and trending lower will hold radiotherapy Thursday Friday and potentially resume on Monday.  Will also transfuse with 2 units of blood to get hemoglobin closer to 10 on Monday.  Will discuss with Dr. Silverman about discontinuation of further chemotherapy with radiation.    Disposition:  Treatment plan and imaging reviewed. Questions answered. Continue therapy outlined.     Ilda JEAN BAPTISTE M.D.    Orders Placed This Encounter    CBC WITH DIFFERENTIAL       Addendum: Contacted patient gave her new lab results which showed decrease platelets to 18,000.  Will hold radiotherapy for the next 2 days will reevaluate on Monday.  Will also transfuse with 2 units of blood to get hemoglobin closer to 10.

## 2025-03-26 NOTE — ADDENDUM NOTE
Encounter addended by: Ilda JEAN BAPTISTE M.D. on: 3/26/2025 3:23 PM   Actions taken: SmartForm saved, Clinical Note Signed

## 2025-03-30 ENCOUNTER — APPOINTMENT (OUTPATIENT)
Dept: ONCOLOGY | Facility: MEDICAL CENTER | Age: 70
DRG: 810 | End: 2025-03-30
Attending: RADIOLOGY
Payer: MEDICARE

## 2025-03-31 ENCOUNTER — HOSPITAL ENCOUNTER (INPATIENT)
Facility: MEDICAL CENTER | Age: 70
LOS: 2 days | DRG: 810 | End: 2025-04-02
Attending: INTERNAL MEDICINE | Admitting: SPECIALIST
Payer: MEDICARE

## 2025-03-31 ENCOUNTER — OUTPATIENT INFUSION SERVICES (OUTPATIENT)
Dept: ONCOLOGY | Facility: MEDICAL CENTER | Age: 70
End: 2025-03-31
Attending: SPECIALIST
Payer: MEDICARE

## 2025-03-31 ENCOUNTER — APPOINTMENT (OUTPATIENT)
Dept: ONCOLOGY | Facility: MEDICAL CENTER | Age: 70
DRG: 810 | End: 2025-03-31
Attending: RADIOLOGY
Payer: MEDICARE

## 2025-03-31 ENCOUNTER — HOSPITAL ENCOUNTER (OUTPATIENT)
Dept: RADIATION ONCOLOGY | Facility: MEDICAL CENTER | Age: 70
End: 2025-03-31

## 2025-03-31 VITALS
HEIGHT: 61 IN | WEIGHT: 196.43 LBS | HEART RATE: 88 BPM | DIASTOLIC BLOOD PRESSURE: 76 MMHG | SYSTOLIC BLOOD PRESSURE: 127 MMHG | TEMPERATURE: 97.7 F | RESPIRATION RATE: 16 BRPM | BODY MASS INDEX: 37.09 KG/M2 | OXYGEN SATURATION: 96 %

## 2025-03-31 DIAGNOSIS — T45.1X5A ANEMIA ASSOCIATED WITH CHEMOTHERAPY: ICD-10-CM

## 2025-03-31 DIAGNOSIS — D64.81 ANEMIA ASSOCIATED WITH CHEMOTHERAPY: ICD-10-CM

## 2025-03-31 DIAGNOSIS — C53.8 MALIGNANT NEOPLASM OVERLAPPING CERVIX UTERI SITE (HCC): ICD-10-CM

## 2025-03-31 PROBLEM — D61.818 PANCYTOPENIA (HCC): Status: ACTIVE | Noted: 2025-03-31

## 2025-03-31 LAB
ABO GROUP BLD: NORMAL
ALBUMIN SERPL BCP-MCNC: 3.5 G/DL (ref 3.2–4.9)
ALBUMIN/GLOB SERPL: 1.3 G/DL
ALP SERPL-CCNC: 46 U/L (ref 30–99)
ALT SERPL-CCNC: 11 U/L (ref 2–50)
ANION GAP SERPL CALC-SCNC: 14 MMOL/L (ref 7–16)
AST SERPL-CCNC: 21 U/L (ref 12–45)
BARCODED ABORH UBTYP: 6200
BARCODED ABORH UBTYP: 6200
BARCODED PRD CODE UBPRD: NORMAL
BARCODED PRD CODE UBPRD: NORMAL
BARCODED UNIT NUM UBUNT: NORMAL
BARCODED UNIT NUM UBUNT: NORMAL
BASOPHILS # BLD AUTO: 1.3 % (ref 0–1.8)
BASOPHILS # BLD: 0.01 K/UL (ref 0–0.12)
BILIRUB SERPL-MCNC: 0.7 MG/DL (ref 0.1–1.5)
BLD GP AB SCN SERPL QL: NORMAL
BUN SERPL-MCNC: 20 MG/DL (ref 8–22)
CALCIUM ALBUM COR SERPL-MCNC: 9.2 MG/DL (ref 8.5–10.5)
CALCIUM SERPL-MCNC: 8.8 MG/DL (ref 8.5–10.5)
CHLORIDE SERPL-SCNC: 98 MMOL/L (ref 96–112)
CO2 SERPL-SCNC: 26 MMOL/L (ref 20–33)
COMPONENT P 8504P: NORMAL
COMPONENT R 8504R: NORMAL
CREAT SERPL-MCNC: 1.35 MG/DL (ref 0.5–1.4)
EOSINOPHIL # BLD AUTO: 0.1 K/UL (ref 0–0.51)
EOSINOPHIL NFR BLD: 13.2 % (ref 0–6.9)
ERYTHROCYTE [DISTWIDTH] IN BLOOD BY AUTOMATED COUNT: 45.2 FL (ref 35.9–50)
GFR SERPLBLD CREATININE-BSD FMLA CKD-EPI: 42 ML/MIN/1.73 M 2
GLOBULIN SER CALC-MCNC: 2.7 G/DL (ref 1.9–3.5)
GLUCOSE SERPL-MCNC: 251 MG/DL (ref 65–99)
HCT VFR BLD AUTO: 22 % (ref 37–47)
HGB BLD-MCNC: 7.5 G/DL (ref 12–16)
IMM GRANULOCYTES # BLD AUTO: 0 K/UL (ref 0–0.11)
IMM GRANULOCYTES NFR BLD AUTO: 0 % (ref 0–0.9)
LYMPHOCYTES # BLD AUTO: 0.17 K/UL (ref 1–4.8)
LYMPHOCYTES NFR BLD: 22.4 % (ref 22–41)
MAGNESIUM SERPL-MCNC: 1.2 MG/DL (ref 1.5–2.5)
MCH RBC QN AUTO: 27.8 PG (ref 27–33)
MCHC RBC AUTO-ENTMCNC: 34.1 G/DL (ref 32.2–35.5)
MCV RBC AUTO: 81.5 FL (ref 81.4–97.8)
MONOCYTES # BLD AUTO: 0.1 K/UL (ref 0–0.85)
MONOCYTES NFR BLD AUTO: 13.2 % (ref 0–13.4)
NEUTROPHILS # BLD AUTO: 0.38 K/UL (ref 1.82–7.42)
NEUTROPHILS NFR BLD: 49.9 % (ref 44–72)
NRBC # BLD AUTO: 0 K/UL
NRBC BLD-RTO: 0 /100 WBC (ref 0–0.2)
OUTPT INFUS CBC COMMENT OICOM: ABNORMAL
PLATELET # BLD AUTO: 10 K/UL (ref 164–446)
PLATELETS.RETICULATED NFR BLD AUTO: 6.4 % (ref 0.6–13.1)
POTASSIUM SERPL-SCNC: 3.7 MMOL/L (ref 3.6–5.5)
PRODUCT TYPE UPROD: NORMAL
PRODUCT TYPE UPROD: NORMAL
PROT SERPL-MCNC: 6.2 G/DL (ref 6–8.2)
RBC # BLD AUTO: 2.7 M/UL (ref 4.2–5.4)
RH BLD: NORMAL
SODIUM SERPL-SCNC: 138 MMOL/L (ref 135–145)
UNIT STATUS USTAT: NORMAL
UNIT STATUS USTAT: NORMAL
WBC # BLD AUTO: 0.8 K/UL (ref 4.8–10.8)

## 2025-03-31 PROCEDURE — 770004 HCHG ROOM/CARE - ONCOLOGY PRIVATE *

## 2025-03-31 PROCEDURE — 83735 ASSAY OF MAGNESIUM: CPT

## 2025-03-31 PROCEDURE — 700111 HCHG RX REV CODE 636 W/ 250 OVERRIDE (IP): Mod: JZ,TB

## 2025-03-31 PROCEDURE — 96374 THER/PROPH/DIAG INJ IV PUSH: CPT

## 2025-03-31 PROCEDURE — 700102 HCHG RX REV CODE 250 W/ 637 OVERRIDE(OP): Performed by: RADIOLOGY

## 2025-03-31 PROCEDURE — 700105 HCHG RX REV CODE 258: Performed by: SPECIALIST

## 2025-03-31 PROCEDURE — A9270 NON-COVERED ITEM OR SERVICE: HCPCS | Performed by: RADIOLOGY

## 2025-03-31 PROCEDURE — A4212 NON CORING NEEDLE OR STYLET: HCPCS

## 2025-03-31 PROCEDURE — P9016 RBC LEUKOCYTES REDUCED: HCPCS

## 2025-03-31 PROCEDURE — 36430 TRANSFUSION BLD/BLD COMPNT: CPT

## 2025-03-31 PROCEDURE — 80053 COMPREHEN METABOLIC PANEL: CPT

## 2025-03-31 PROCEDURE — 700111 HCHG RX REV CODE 636 W/ 250 OVERRIDE (IP): Mod: JZ | Performed by: RADIOLOGY

## 2025-03-31 PROCEDURE — 86923 COMPATIBILITY TEST ELECTRIC: CPT

## 2025-03-31 RX ORDER — 0.9 % SODIUM CHLORIDE 0.9 %
3 VIAL (ML) INJECTION PRN
Status: CANCELLED | OUTPATIENT
Start: 2025-03-31

## 2025-03-31 RX ORDER — DIPHENHYDRAMINE HCL 25 MG
25 TABLET ORAL ONCE
Status: CANCELLED | OUTPATIENT
Start: 2025-03-31 | End: 2025-03-31

## 2025-03-31 RX ORDER — SODIUM CHLORIDE 9 MG/ML
INJECTION, SOLUTION INTRAVENOUS CONTINUOUS
Status: DISCONTINUED | OUTPATIENT
Start: 2025-03-31 | End: 2025-04-02 | Stop reason: HOSPADM

## 2025-03-31 RX ORDER — LANOLIN ALCOHOL/MO/W.PET/CERES
400 CREAM (GRAM) TOPICAL 2 TIMES DAILY
Status: DISCONTINUED | OUTPATIENT
Start: 2025-03-31 | End: 2025-03-31

## 2025-03-31 RX ORDER — MAGNESIUM SULFATE HEPTAHYDRATE 40 MG/ML
4 INJECTION, SOLUTION INTRAVENOUS ONCE
Status: COMPLETED | OUTPATIENT
Start: 2025-03-31 | End: 2025-03-31

## 2025-03-31 RX ORDER — ONDANSETRON 4 MG/1
4 TABLET, ORALLY DISINTEGRATING ORAL EVERY 6 HOURS PRN
Status: DISCONTINUED | OUTPATIENT
Start: 2025-03-31 | End: 2025-04-02 | Stop reason: HOSPADM

## 2025-03-31 RX ORDER — 0.9 % SODIUM CHLORIDE 0.9 %
VIAL (ML) INJECTION PRN
Status: CANCELLED | OUTPATIENT
Start: 2025-03-31

## 2025-03-31 RX ORDER — 0.9 % SODIUM CHLORIDE 0.9 %
10 VIAL (ML) INJECTION PRN
Status: CANCELLED | OUTPATIENT
Start: 2025-03-31

## 2025-03-31 RX ORDER — HYDROCORTISONE SODIUM SUCCINATE 100 MG/2ML
100 INJECTION INTRAMUSCULAR; INTRAVENOUS PRN
Status: CANCELLED | OUTPATIENT
Start: 2025-03-31

## 2025-03-31 RX ORDER — TRAMADOL HYDROCHLORIDE 50 MG/1
50 TABLET ORAL EVERY 6 HOURS PRN
Status: DISCONTINUED | OUTPATIENT
Start: 2025-03-31 | End: 2025-04-02 | Stop reason: HOSPADM

## 2025-03-31 RX ORDER — ACETAMINOPHEN 325 MG/1
650 TABLET ORAL ONCE
Status: COMPLETED | OUTPATIENT
Start: 2025-03-31 | End: 2025-03-31

## 2025-03-31 RX ORDER — HYDROCORTISONE SODIUM SUCCINATE 100 MG/2ML
100 INJECTION INTRAMUSCULAR; INTRAVENOUS ONCE
Status: COMPLETED | OUTPATIENT
Start: 2025-03-31 | End: 2025-03-31

## 2025-03-31 RX ORDER — ACETAMINOPHEN 325 MG/1
650 TABLET ORAL ONCE
Status: CANCELLED | OUTPATIENT
Start: 2025-03-31

## 2025-03-31 RX ORDER — DIPHENHYDRAMINE HCL 25 MG
25 TABLET ORAL ONCE
Status: COMPLETED | OUTPATIENT
Start: 2025-03-31 | End: 2025-03-31

## 2025-03-31 RX ORDER — ACETAMINOPHEN 325 MG/1
650 TABLET ORAL EVERY 6 HOURS PRN
Status: DISCONTINUED | OUTPATIENT
Start: 2025-03-31 | End: 2025-04-02 | Stop reason: HOSPADM

## 2025-03-31 RX ORDER — SODIUM CHLORIDE 9 MG/ML
INJECTION, SOLUTION INTRAVENOUS CONTINUOUS
Status: CANCELLED | OUTPATIENT
Start: 2025-03-31

## 2025-03-31 RX ORDER — DIPHENHYDRAMINE HYDROCHLORIDE 50 MG/ML
25 INJECTION, SOLUTION INTRAMUSCULAR; INTRAVENOUS PRN
Status: CANCELLED | OUTPATIENT
Start: 2025-03-31

## 2025-03-31 RX ORDER — ACETAMINOPHEN 325 MG/1
650 TABLET ORAL PRN
Status: CANCELLED | OUTPATIENT
Start: 2025-03-31

## 2025-03-31 RX ORDER — HYDROCORTISONE SODIUM SUCCINATE 100 MG/2ML
100 INJECTION INTRAMUSCULAR; INTRAVENOUS ONCE
Status: CANCELLED | OUTPATIENT
Start: 2025-03-31 | End: 2025-03-31

## 2025-03-31 RX ADMIN — MAGNESIUM SULFATE HEPTAHYDRATE 4 G: 4 INJECTION, SOLUTION INTRAVENOUS at 17:30

## 2025-03-31 RX ADMIN — HYDROCORTISONE SODIUM SUCCINATE 100 MG: 100 INJECTION, POWDER, FOR SOLUTION INTRAMUSCULAR; INTRAVENOUS at 10:31

## 2025-03-31 RX ADMIN — DIPHENHYDRAMINE HYDROCHLORIDE 25 MG: 25 TABLET ORAL at 10:31

## 2025-03-31 RX ADMIN — SODIUM CHLORIDE: 9 INJECTION, SOLUTION INTRAVENOUS at 14:32

## 2025-03-31 RX ADMIN — ACETAMINOPHEN 650 MG: 325 TABLET ORAL at 10:31

## 2025-03-31 RX ADMIN — TBO-FILGRASTIM 480 MCG: 480 INJECTION, SOLUTION SUBCUTANEOUS at 14:32

## 2025-03-31 SDOH — ECONOMIC STABILITY: TRANSPORTATION INSECURITY
IN THE PAST 12 MONTHS, HAS LACK OF RELIABLE TRANSPORTATION KEPT YOU FROM MEDICAL APPOINTMENTS, MEETINGS, WORK OR FROM GETTING THINGS NEEDED FOR DAILY LIVING?: NO

## 2025-03-31 SDOH — ECONOMIC STABILITY: TRANSPORTATION INSECURITY
IN THE PAST 12 MONTHS, HAS THE LACK OF TRANSPORTATION KEPT YOU FROM MEDICAL APPOINTMENTS OR FROM GETTING MEDICATIONS?: NO

## 2025-03-31 ASSESSMENT — FIBROSIS 4 INDEX
FIB4 SCORE: 51.67
FIB4 SCORE: 28.71

## 2025-03-31 ASSESSMENT — LIFESTYLE VARIABLES
AVERAGE NUMBER OF DAYS PER WEEK YOU HAVE A DRINK CONTAINING ALCOHOL: 0
CONSUMPTION TOTAL: NEGATIVE
HAVE YOU EVER FELT YOU SHOULD CUT DOWN ON YOUR DRINKING: NO
TOTAL SCORE: 0
EVER HAD A DRINK FIRST THING IN THE MORNING TO STEADY YOUR NERVES TO GET RID OF A HANGOVER: NO
HOW MANY TIMES IN THE PAST YEAR HAVE YOU HAD 5 OR MORE DRINKS IN A DAY: 0
ALCOHOL_USE: NO
ON A TYPICAL DAY WHEN YOU DRINK ALCOHOL HOW MANY DRINKS DO YOU HAVE: 0
TOTAL SCORE: 0
HAVE PEOPLE ANNOYED YOU BY CRITICIZING YOUR DRINKING: NO
TOTAL SCORE: 0
EVER FELT BAD OR GUILTY ABOUT YOUR DRINKING: NO

## 2025-03-31 ASSESSMENT — COGNITIVE AND FUNCTIONAL STATUS - GENERAL
MOBILITY SCORE: 24
SUGGESTED CMS G CODE MODIFIER DAILY ACTIVITY: CH
DAILY ACTIVITIY SCORE: 24
SUGGESTED CMS G CODE MODIFIER MOBILITY: CH

## 2025-03-31 ASSESSMENT — SOCIAL DETERMINANTS OF HEALTH (SDOH)
WITHIN THE LAST YEAR, HAVE TO BEEN RAPED OR FORCED TO HAVE ANY KIND OF SEXUAL ACTIVITY BY YOUR PARTNER OR EX-PARTNER?: NO
WITHIN THE PAST 12 MONTHS, YOU WORRIED THAT YOUR FOOD WOULD RUN OUT BEFORE YOU GOT THE MONEY TO BUY MORE: NEVER TRUE
WITHIN THE LAST YEAR, HAVE YOU BEEN AFRAID OF YOUR PARTNER OR EX-PARTNER?: NO
WITHIN THE PAST 12 MONTHS, THE FOOD YOU BOUGHT JUST DIDN'T LAST AND YOU DIDN'T HAVE MONEY TO GET MORE: NEVER TRUE
WITHIN THE LAST YEAR, HAVE YOU BEEN HUMILIATED OR EMOTIONALLY ABUSED IN OTHER WAYS BY YOUR PARTNER OR EX-PARTNER?: NO
WITHIN THE LAST YEAR, HAVE YOU BEEN KICKED, HIT, SLAPPED, OR OTHERWISE PHYSICALLY HURT BY YOUR PARTNER OR EX-PARTNER?: NO
IN THE PAST 12 MONTHS, HAS THE ELECTRIC, GAS, OIL, OR WATER COMPANY THREATENED TO SHUT OFF SERVICE IN YOUR HOME?: NO

## 2025-03-31 ASSESSMENT — PATIENT HEALTH QUESTIONNAIRE - PHQ9
1. LITTLE INTEREST OR PLEASURE IN DOING THINGS: NOT AT ALL
SUM OF ALL RESPONSES TO PHQ9 QUESTIONS 1 AND 2: 0
2. FEELING DOWN, DEPRESSED, IRRITABLE, OR HOPELESS: NOT AT ALL

## 2025-03-31 NOTE — CARE PLAN
The patient is Watcher - Medium risk of patient condition declining or worsening         Progress made toward(s) clinical / shift goals:         Problem: Knowledge Deficit - Standard  Goal: Patient and family/care givers will demonstrate understanding of plan of care, disease process/condition, diagnostic tests and medications  Outcome: Progressing

## 2025-03-31 NOTE — PROGRESS NOTES
Port accessed in sterile fashion with brisk blood return. Labs collected.   Critical labs WBC 0.8  platelet 10  ANC  0.38  along with hemoglobin of 7.5. MD notified. Order received to transfuse one unit of platelets. Premedications given: tylenol, benadryl and hydrocortisone.  One unit of platelets transfused. Patient tolerated infusion without complaint.  MD called back to state he wanted patient transferred to inpatient for further monitoring. Port left accessed. Patient assisted to transferring unit in stable condition.

## 2025-03-31 NOTE — PROGRESS NOTES
Diane arrives ambulatory to [facility] for 2 units PRBC for Hgb 7.5.   Diane reports increased fatigue and tiredness, SOB with exertion.  RUC port accessed in sterile fashion, flushes easily, brisk blood return observed.  CBC and COD collected.      Tech from Lab called with critical result of WBC 0.8, ANC 0.38, platelets 10K at 1012. Critical lab result read back to Tech.   MAYRA Santoyo notified of critical lab result at 1015.  Critical lab result read back by [place].    Order received to transfuse 1 unit platelets today in addition to 2 units prbc.      Pre-medications administered per MAR.  1 unit platelets transfused, Diane tolerated well.  Port flushed with NS, + blood return observed, port saline locked and left accessed for future use.      Call received from MAYRA Montero, Dr Silverman to admit Diane to inpatient Cancer Nursing Unit.  Diane informed of inpatient hospital admission for pancytopenia.  Diane verbalized understanding.      Report called to Charlee Milner RN.  Diane escorted by this RN to CNU room 305.

## 2025-04-01 ENCOUNTER — HOSPITAL ENCOUNTER (OUTPATIENT)
Dept: RADIATION ONCOLOGY | Facility: MEDICAL CENTER | Age: 70
End: 2025-04-01
Attending: RADIOLOGY
Payer: MEDICARE

## 2025-04-01 ENCOUNTER — APPOINTMENT (OUTPATIENT)
Dept: RADIOLOGY | Facility: MEDICAL CENTER | Age: 70
End: 2025-04-01
Attending: RADIOLOGY
Payer: MEDICARE

## 2025-04-01 ENCOUNTER — APPOINTMENT (OUTPATIENT)
Dept: RADIOLOGY | Facility: MEDICAL CENTER | Age: 70
DRG: 810 | End: 2025-04-01
Attending: RADIOLOGY
Payer: MEDICARE

## 2025-04-01 LAB
ALBUMIN SERPL BCP-MCNC: 3.3 G/DL (ref 3.2–4.9)
ALBUMIN/GLOB SERPL: 1.3 G/DL
ALP SERPL-CCNC: 40 U/L (ref 30–99)
ALT SERPL-CCNC: 9 U/L (ref 2–50)
ANION GAP SERPL CALC-SCNC: 11 MMOL/L (ref 7–16)
AST SERPL-CCNC: 18 U/L (ref 12–45)
BASOPHILS # BLD AUTO: 0 % (ref 0–1.8)
BASOPHILS # BLD: 0 K/UL (ref 0–0.12)
BILIRUB SERPL-MCNC: 0.6 MG/DL (ref 0.1–1.5)
BUN SERPL-MCNC: 19 MG/DL (ref 8–22)
CALCIUM ALBUM COR SERPL-MCNC: 9.4 MG/DL (ref 8.5–10.5)
CALCIUM SERPL-MCNC: 8.8 MG/DL (ref 8.5–10.5)
CHLORIDE SERPL-SCNC: 100 MMOL/L (ref 96–112)
CO2 SERPL-SCNC: 26 MMOL/L (ref 20–33)
CREAT SERPL-MCNC: 1.17 MG/DL (ref 0.5–1.4)
EOSINOPHIL # BLD AUTO: 0.11 K/UL (ref 0–0.51)
EOSINOPHIL NFR BLD: 8.5 % (ref 0–6.9)
ERYTHROCYTE [DISTWIDTH] IN BLOOD BY AUTOMATED COUNT: 44.6 FL (ref 35.9–50)
GFR SERPLBLD CREATININE-BSD FMLA CKD-EPI: 50 ML/MIN/1.73 M 2
GLOBULIN SER CALC-MCNC: 2.6 G/DL (ref 1.9–3.5)
GLUCOSE SERPL-MCNC: 130 MG/DL (ref 65–99)
HCT VFR BLD AUTO: 22.9 % (ref 37–47)
HGB BLD-MCNC: 8 G/DL (ref 12–16)
IMM GRANULOCYTES # BLD AUTO: 0.02 K/UL (ref 0–0.11)
IMM GRANULOCYTES NFR BLD AUTO: 1.5 % (ref 0–0.9)
LYMPHOCYTES # BLD AUTO: 0.19 K/UL (ref 1–4.8)
LYMPHOCYTES NFR BLD: 14.6 % (ref 22–41)
MAGNESIUM SERPL-MCNC: 2.1 MG/DL (ref 1.5–2.5)
MCH RBC QN AUTO: 27.7 PG (ref 27–33)
MCHC RBC AUTO-ENTMCNC: 34.9 G/DL (ref 32.2–35.5)
MCV RBC AUTO: 79.2 FL (ref 81.4–97.8)
MONOCYTES # BLD AUTO: 0.11 K/UL (ref 0–0.85)
MONOCYTES NFR BLD AUTO: 8.5 % (ref 0–13.4)
NEUTROPHILS # BLD AUTO: 0.87 K/UL (ref 1.82–7.42)
NEUTROPHILS NFR BLD: 66.9 % (ref 44–72)
NRBC # BLD AUTO: 0 K/UL
NRBC BLD-RTO: 0 /100 WBC (ref 0–0.2)
PLATELET # BLD AUTO: 24 K/UL (ref 164–446)
PLATELETS.RETICULATED NFR BLD AUTO: 4.1 % (ref 0.6–13.1)
POTASSIUM SERPL-SCNC: 3 MMOL/L (ref 3.6–5.5)
PROT SERPL-MCNC: 5.9 G/DL (ref 6–8.2)
RBC # BLD AUTO: 2.89 M/UL (ref 4.2–5.4)
SODIUM SERPL-SCNC: 137 MMOL/L (ref 135–145)
WBC # BLD AUTO: 1.3 K/UL (ref 4.8–10.8)

## 2025-04-01 PROCEDURE — 85055 RETICULATED PLATELET ASSAY: CPT

## 2025-04-01 PROCEDURE — 700117 HCHG RX CONTRAST REV CODE 255: Mod: JZ

## 2025-04-01 PROCEDURE — 80053 COMPREHEN METABOLIC PANEL: CPT

## 2025-04-01 PROCEDURE — 72197 MRI PELVIS W/O & W/DYE: CPT

## 2025-04-01 PROCEDURE — 700111 HCHG RX REV CODE 636 W/ 250 OVERRIDE (IP)

## 2025-04-01 PROCEDURE — A9579 GAD-BASE MR CONTRAST NOS,1ML: HCPCS | Mod: JZ

## 2025-04-01 PROCEDURE — A9270 NON-COVERED ITEM OR SERVICE: HCPCS | Performed by: SPECIALIST

## 2025-04-01 PROCEDURE — 700102 HCHG RX REV CODE 250 W/ 637 OVERRIDE(OP): Performed by: SPECIALIST

## 2025-04-01 PROCEDURE — 83735 ASSAY OF MAGNESIUM: CPT

## 2025-04-01 PROCEDURE — 85025 COMPLETE CBC W/AUTO DIFF WBC: CPT

## 2025-04-01 PROCEDURE — 700105 HCHG RX REV CODE 258: Performed by: SPECIALIST

## 2025-04-01 PROCEDURE — 770004 HCHG ROOM/CARE - ONCOLOGY PRIVATE *

## 2025-04-01 RX ORDER — POTASSIUM CHLORIDE 7.45 MG/ML
10 INJECTION INTRAVENOUS
Status: COMPLETED | OUTPATIENT
Start: 2025-04-01 | End: 2025-04-01

## 2025-04-01 RX ORDER — PROCHLORPERAZINE MALEATE 10 MG
10 TABLET ORAL EVERY 6 HOURS PRN
Status: ON HOLD | COMMUNITY
Start: 2025-03-27 | End: 2025-04-22

## 2025-04-01 RX ADMIN — ACETAMINOPHEN 650 MG: 325 TABLET ORAL at 11:06

## 2025-04-01 RX ADMIN — POTASSIUM CHLORIDE 10 MEQ: 7.46 INJECTION, SOLUTION INTRAVENOUS at 08:37

## 2025-04-01 RX ADMIN — POTASSIUM CHLORIDE 10 MEQ: 7.46 INJECTION, SOLUTION INTRAVENOUS at 14:10

## 2025-04-01 RX ADMIN — ONDANSETRON 4 MG: 4 TABLET, ORALLY DISINTEGRATING ORAL at 19:26

## 2025-04-01 RX ADMIN — TBO-FILGRASTIM 480 MCG: 480 INJECTION, SOLUTION SUBCUTANEOUS at 15:25

## 2025-04-01 RX ADMIN — ACETAMINOPHEN 650 MG: 325 TABLET ORAL at 19:26

## 2025-04-01 RX ADMIN — POTASSIUM CHLORIDE 10 MEQ: 7.46 INJECTION, SOLUTION INTRAVENOUS at 09:45

## 2025-04-01 RX ADMIN — POTASSIUM CHLORIDE 10 MEQ: 7.46 INJECTION, SOLUTION INTRAVENOUS at 11:01

## 2025-04-01 RX ADMIN — GADOTERIDOL 18 ML: 279.3 INJECTION, SOLUTION INTRAVENOUS at 13:53

## 2025-04-01 RX ADMIN — ACETAMINOPHEN 650 MG: 325 TABLET ORAL at 04:33

## 2025-04-01 RX ADMIN — SODIUM CHLORIDE 1000 ML: 9 INJECTION, SOLUTION INTRAVENOUS at 15:34

## 2025-04-01 ASSESSMENT — ENCOUNTER SYMPTOMS
ABDOMINAL PAIN: 0
DIARRHEA: 1
FEVER: 0
VOMITING: 0
SHORTNESS OF BREATH: 0
BRUISES/BLEEDS EASILY: 0
NAUSEA: 1
CHILLS: 0
FOCAL WEAKNESS: 0
CONSTIPATION: 1
MYALGIAS: 0

## 2025-04-01 ASSESSMENT — PAIN DESCRIPTION - PAIN TYPE
TYPE: ACUTE PAIN

## 2025-04-01 NOTE — PROGRESS NOTES
Med rec is complete per Patient at bedside.     Reconcile outside Information was available?Y  Allergies reviewed.    Has patient had any outside antibiotics in the last 30 days? N    Any Anticoagulants (rivaroxaban, apixaban, edoxaban, dabigatran, warfarin, enoxaparin) taken in the last 14 days? N

## 2025-04-01 NOTE — CARE PLAN
The patient is Stable - Low risk of patient condition declining or worsening         Progress made toward(s) clinical / shift goals:    Problem: Knowledge Deficit - Standard  Goal: Patient and family/care givers will demonstrate understanding of plan of care, disease process/condition, diagnostic tests and medications  Description: Target End Date:  1-3 days or as soon as patient condition allowsDocument in Patient Education1.  Patient and family/caregiver oriented to unit, equipment, visitation policy and means for communicating concern2.  Complete/review Learning Assessment3.  Assess knowledge level of disease process/condition, treatment plan, diagnostic tests and medications4.  Explain disease process/condition, treatment plan, diagnostic tests and medications  Outcome: Progressing       Patient is not progressing towards the following goals:

## 2025-04-01 NOTE — CARE PLAN
The patient is Watcher - Medium risk of patient condition declining or worsening    Shift Goals  Clinical Goals: Discharge for outpatient MRI  Patient Goals: Dischrage for outpatient MRI    Progress made toward(s) clinical / shift goals: Discharged postponed for tomorrow, MRI rescheduled to 1230 and was done today.     Problem: Knowledge Deficit - Standard  Goal: Patient and family/care givers will demonstrate understanding of plan of care, disease process/condition, diagnostic tests and medications  Outcome: Progressing  Note: Patient understands, plan of care, is planning for discharge tomorrow.      Problem: Pain - Standard  Goal: Alleviation of pain or a reduction in pain to the patient’s comfort goal  Outcome: Progressing  Note: Reported pain using 0 - 10 pain scale, Pain management medications administered as ordered        Patient is not progressing towards the following goals:

## 2025-04-01 NOTE — H&P
Gynecologic Oncology H&P    Date: 2025    Admitting Physician: Ambrose Silverman MD/DAVID Ferrell     CC: low platelets, sent by provider      HPI: Ms. Nena Barrett is a  69 year old  female whose LMP is age 50. 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 She past surgical history significant for c/s, tubal ligation, left knee replacement and hand surgery.     She was in her usual state of health until she presented to Dr. Mayer complaining of post menopausal bleeding. Pap smear showed HSIL and a TVUS showed a mass effect from a lesion in the lower uterine segment measuring 8.1 x  4.7 x 5.15 cm. The uterus appears to be retroverted. Endometrium was difficult to appreciate with the retroverted uterus us heterogeneous and thickened uo to approximately 3.6 cm. There are 2 masses in the cervical region measuring approximately 1.97 x 1.3 x 1.8 cm and 1.8 x 1.07 x 1.12 cm. Limited views of the bladder unremarkable. Due to these findings, she was referred for  further evaluation.     24 Cervical biopsy= showed P16 positive, poorly differentiated Infiltrative malignancy with neuroendocrine features.    10/7/24 PET CT= Very large 15.8 cm intensely hypermetabolic uterine mass consistent with malignancy extending from the superior uterus throughout the endometrium and cervix. Additional smaller 4.0 cm hypermetabolic mass in the periphery of the upper right uterine fundus which may also represent malignancy, Several hypermetabolic retroperitoneal and pelvic lymph node  metastases. Very small intensely hypermetabolic left inguinal lymph node which may represent metastatic disease.     She was recommended to start neoadjuvant chemotherapy due to the aggressive nature of her cancer.   S/P Cycle # 1 10/ for Cisplatin/Etoposide chemotherapy.  S/P Cycle # 2 -2024 Cisplatin/Etoposide chemotherapy.   S/P Cycle # 3 - 24  Cisplatin/Etoposide chemotherapy.     12/27/24 PET CT Response to treatment, Marked decrease in the previously seen very large intensely hypermetabolic uterine mass consistent with malignancy measuring up to 3.3, previously 15.8. Resolution of the additional smaller 4.0 cm hypermetabolic mass in the upper right uterine fundus. Decreased in enlarged hypermetabolic retroperitoneal and pelvic lymph nodes consistent with metastatic disease. Resolution of the very hypermetabolic left inguinal lymph node previously seen    S/P Cycle #4 1/8/251/10/25 Cisplatin/Etoposide chemotherapy after being postponed by 1 week due to a dental abscess treated with  antibiotics. She followed up with her dentist and underwent a root canal the week prior to he presenting for reevaluation  of cycle # 4 for Cisplatin/Etoposide chemotherapy via telehealth on 1/7/25.    She completed cycle #5 on 1/31/25. She is also s/p blood transfusion for Hgb 6.7 on 1/29/25.    She then started Cisplatin with concurrent XRT.   S/p C1 cisplatin 3/3/2025  S/p C2 cisplatin 3/10/2025  S/p C3 cisplatin 3/17/2025  C4 cisplatin due 3/24/2025 held for neutropenia and thrombocytopenia  C5 cisplatin due 3/31/2025 held for neutropenia and thrombocytopenia    Pretreatment labs from 3/31/2025 4 notable for platelets of 10,000, ANC of 0.38, and hemoglobin of 7.5.  Given her pancytopenia and concern for risk of spontaneous bleed with platelets of 10,000 she was directly admitted from [facility] to the cancer nursing unit the afternoon of 3/31/2025.  She was transfused with 1 pack of platelets and 1 unit of red blood cells.  She was started on daily Granix for ANC less than 1;  additionally her magnesium was also replaced.     Patient was seen and examined at bedside this morning in conjunction with Dr. Silverman.  She reports feeling slightly better after receiving her blood transfusion yesterday but continues to have significant fatigue.  She notes decreased appetite and endorses  "intermittent nausea but denies vomiting.  She denies any vaginal bleeding or blood in her urine/other signs/symptoms of bleeding.  She endorses alternating constipation and diarrhea.  She was scheduled for a MRI ordered by Dr. Montenegro this afternoon and is concerned about missing this appointment.  She is otherwise feeling well.    Review of Systems   Constitutional:  Positive for malaise/fatigue. Negative for chills and fever.   Respiratory:  Negative for shortness of breath.    Cardiovascular:  Negative for chest pain and leg swelling.   Gastrointestinal:  Positive for constipation, diarrhea and nausea. Negative for abdominal pain and vomiting.   Genitourinary:  Negative for dysuria.   Musculoskeletal:  Negative for myalgias.   Neurological:  Negative for focal weakness.   Endo/Heme/Allergies:  Does not bruise/bleed easily.        Past Medical History:   Diagnosis Date    Anemia 03/26/2025    r/t chemo, with transfusions    Anesthesia 03/26/2025    PONV, pt with history of motion sickness    Anxiety     Arrhythmia     \"extra heart beat  but has never been a problem\"    Cancer (HCC)     rare cervical (neroendocrio)-getting chemo    Pain     hand    PONV (postoperative nausea and vomiting) 03/26/2025    PONV pt with history of motion sickness    Snoring 03/26/2025    Thalassemia minor        Past Surgical History:   Procedure Laterality Date    KNEE ARTHROPLASTY TOTAL Left 10/15/2018    Procedure: LEFT TOTAL KNEE REPLACEMENT;  Surgeon: Vu Iverson M.D.;  Location: [facility];  Service: Orthopedics    DUPUYTREN CONTRACTURE RELEASE  09/17/2012    Performed by Shyam Orr M.D. at [facility]    FULL THICKNESS SKIN GRAFT  09/17/2012    Performed by Shyam Orr M.D. at [facility]    IRRIGATION & DEBRIDEMENT ORTHO  03/12/2012    Performed by SHYAM ORR at [facility]    FINGER OR HAND INCISION AND DRAINAGE  02/13/2012    Performed by SHYAM ORR " at SURGERY [facility]S    LACERATION REPAIR  2012    Performed by CANDICE ORR at SURGERY [facility]    CARPAL TUNNEL RELEASE  2012    Performed by CANDICE ORR at SURGERY [facility]    MUSCLE REPAIR  2012    Performed by CANDICE ORR at SURGERY [facility]    HAND SURGERY  2012    left hand trauma    TUBAL LIGATION      PRIMARY C SECTION          KNEE ARTHROSCOPY Left     years prior to replacement    TONSILLECTOMY      as child           Current Facility-Administered Medications   Medication Dose Route Frequency Provider Last Rate Last Admin    potassium chloride (KCL) ivpb 10 mEq  10 mEq Intravenous Q HOUR Samantha Morales, A.P.R.N.        ondansetron (Zofran ODT) dispertab 4 mg  4 mg Oral Q6HRS PRN Samantha Morales, A.P.R.N.        traMADol (Ultram) 50 MG tablet 50 mg  50 mg Oral Q6HRS PRN Samantha Morales, A.P.R.N.        NS infusion   Intravenous Continuous Ambrose Silverman M.D. 75 mL/hr at 25 1432 New Bag at 25 1432    acetaminophen (Tylenol) tablet 650 mg  650 mg Oral Q6HRS PRN Ambrose Silverman M.D.   650 mg at 25 0433    tbo-filgrastim (Granix) injection 480 mcg  5 mcg/kg Subcutaneous Daily-1400 Samantha Morales A.P.R.N.   480 mcg at 25 1432       Allergies:  Morphine    Social History     Socioeconomic History    Marital status:      Spouse name: Not on file    Number of children: Not on file    Years of education: Not on file    Highest education level: Not on file   Occupational History    Not on file   Tobacco Use    Smoking status: Former     Current packs/day: 0.00     Average packs/day: 1 pack/day for 5.0 years (5.0 ttl pk-yrs)     Types: Cigarettes     Start date: 1977     Quit date: 1982     Years since quittin.2    Smokeless tobacco: Never   Vaping Use    Vaping status: Never Used   Substance and Sexual Activity    Alcohol use: No    Drug use: No    Sexual activity: Not on file   Other Topics Concern    Not  "on file   Social History Narrative    ** Merged History Encounter **          Social Drivers of Health     Financial Resource Strain: Not on file   Food Insecurity: No Food Insecurity (3/31/2025)    Hunger Vital Sign     Worried About Running Out of Food in the Last Year: Never true     Ran Out of Food in the Last Year: Never true   Transportation Needs: No Transportation Needs (3/31/2025)    PRAPARE - Transportation     Lack of Transportation (Medical): No     Lack of Transportation (Non-Medical): No   Physical Activity: Not on file   Stress: Not on file   Social Connections: Not on file   Intimate Partner Violence: Not At Risk (3/31/2025)    Humiliation, Afraid, Rape, and Kick questionnaire     Fear of Current or Ex-Partner: No     Emotionally Abused: No     Physically Abused: No     Sexually Abused: No   Housing Stability: Low Risk  (3/31/2025)    Housing Stability Vital Sign     Unable to Pay for Housing in the Last Year: No     Number of Times Moved in the Last Year: 0     Homeless in the Last Year: No       Family History   Problem Relation Age of Onset    Colorectal Cancer Mother     Cancer Mother         cervical, colon    Breast Cancer Sister     Cancer Sister     Hypertension Sister     Diabetes Brother     Heart Attack Brother     Hypertension Brother     Hyperlipidemia Brother     Heart Attack Brother     Hypertension Brother     Hyperlipidemia Brother     Cancer Other     Stroke Other     Heart Disease Other     Hypertension Other          Physical Exam:  /64   Pulse (!) 102   Temp 37.1 °C (98.7 °F) (Oral)   Resp 18   Ht 1.549 m (5' 1\")   Wt 89.3 kg (196 lb 13.9 oz)   SpO2 90%          Physical Exam  Constitutional:       General: She is not in acute distress.     Appearance: She is not toxic-appearing.   HENT:      Head: Normocephalic.   Eyes:      General:         Right eye: No discharge.         Left eye: No discharge.   Cardiovascular:      Rate and Rhythm: Normal rate.      Pulses: " Normal pulses.   Pulmonary:      Effort: Pulmonary effort is normal. No respiratory distress.   Abdominal:      General: There is no distension.      Palpations: Abdomen is soft.      Tenderness: There is no abdominal tenderness.   Genitourinary:     General: Normal vulva.      Comments: Cervix nodular at 12:00 (pelvic exam performed by Dr. Silverman with myself present as chaperone)   Musculoskeletal:      Right lower leg: No edema.      Left lower leg: No edema.   Skin:     General: Skin is warm.      Capillary Refill: Capillary refill takes 2 to 3 seconds.      Coloration: Skin is pale.   Neurological:      General: No focal deficit present.      Mental Status: She is alert and oriented to person, place, and time.      Cranial Nerves: No cranial nerve deficit.   Psychiatric:         Mood and Affect: Mood normal.         Behavior: Behavior normal.          Labs:  Recent Labs     03/31/25  0937 04/01/25  0015   WBC 0.8* 1.3*   RBC 2.70* 2.89*   HEMOGLOBIN 7.5* 8.0*   HEMATOCRIT 22.0* 22.9*   MCV 81.5 79.2*   MCH 27.8 27.7   MCHC 34.1 34.9   RDW 45.2 44.6   PLATELETCT 10* 24*     Recent Labs     03/31/25  1534 04/01/25  0015   SODIUM 138 137   POTASSIUM 3.7 3.0*   CHLORIDE 98 100   CO2 26 26   GLUCOSE 251* 130*   BUN 20 19   CREATININE 1.35 1.17   CALCIUM 8.8 8.8         Recent Labs     03/31/25  1534 04/01/25  0015   ASTSGOT 21 18   ALTSGPT 11 9   TBILIRUBIN 0.7 0.6   ALKPHOSPHAT 46 40   GLOBULIN 2.7 2.6           Assessment/plan This is a 69 y.o. 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  Directly admitted secondary to concern for risk of spontaneous hemorrhage.  Status post transfusion 1 to 1 pack of platelets.  Platelets today 24,000.  No signs of active  bleeding.  Continue to monitor.  May likely DC home to long as platelets remain stable tomorrow.    # Neutropenia: ANC of 0.38 on 3/31/2025.  Afebrile.  Started on Granix.  ANC today 0.85.  Continue daily Granix. CTM.     # Anemia: Secondary to chemotherapy.  Status post 1 unit of RBCs on 3/31/2025.  Hemoglobin 8.0 this morning.  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 Schuler  today who will order inpatient MRI  -Pelvic exam performed by Dr. Silverman at bedside today demonstrates response to current treatment.  Dr. Silverman reviewed with patient that she will need to complete vaginal brachytherapy which is scheduled to start on April 7, 2025.  He will want to see patient approximately 4 weeks after she completes radiation to reassess her disease status.  He briefly reviewed that she may require additional chemotherapy versus surgical intervention after she has completed radiation depending on her treatment response.     # Hypomagnesia: Secondary to chemotherapy.  Mag of 1.2 repleted with 4 mg IV mag sulfate on 3/31. Mag 2.1 this AM    # Hypokalemia: Again secondary to her chemotherapy.  Potassium 3.0 this morning.  Replete IV.  Monitor a.m. labs    # Prophylaxis: SCDs and ambulation.  Pharmacologic DVT prophylaxis contraindicated given severe thrombocytopenia.    Patient seen in conjunction with Dr. Silverman

## 2025-04-01 NOTE — PROGRESS NOTES
Patient given 30 ccs of ultrasound vaginally-inserted by this nurse. Gel given for MRI protocol.Patient tolerated well

## 2025-04-02 ENCOUNTER — APPOINTMENT (OUTPATIENT)
Dept: RADIATION ONCOLOGY | Facility: MEDICAL CENTER | Age: 70
End: 2025-04-02
Attending: RADIOLOGY
Payer: MEDICARE

## 2025-04-02 VITALS
DIASTOLIC BLOOD PRESSURE: 86 MMHG | BODY MASS INDEX: 37.17 KG/M2 | TEMPERATURE: 98.4 F | HEIGHT: 61 IN | SYSTOLIC BLOOD PRESSURE: 138 MMHG | WEIGHT: 196.87 LBS | HEART RATE: 92 BPM | OXYGEN SATURATION: 96 % | RESPIRATION RATE: 18 BRPM

## 2025-04-02 LAB
ANION GAP SERPL CALC-SCNC: 11 MMOL/L (ref 7–16)
ANISOCYTOSIS BLD QL SMEAR: ABNORMAL
BASOPHILS # BLD AUTO: 0.9 % (ref 0–1.8)
BASOPHILS # BLD: 0.02 K/UL (ref 0–0.12)
BUN SERPL-MCNC: 15 MG/DL (ref 8–22)
CALCIUM SERPL-MCNC: 8.9 MG/DL (ref 8.5–10.5)
CHLORIDE SERPL-SCNC: 104 MMOL/L (ref 96–112)
CO2 SERPL-SCNC: 25 MMOL/L (ref 20–33)
CREAT SERPL-MCNC: 1.1 MG/DL (ref 0.5–1.4)
EOSINOPHIL # BLD AUTO: 0.22 K/UL (ref 0–0.51)
EOSINOPHIL NFR BLD: 11.4 % (ref 0–6.9)
ERYTHROCYTE [DISTWIDTH] IN BLOOD BY AUTOMATED COUNT: 46.1 FL (ref 35.9–50)
GFR SERPLBLD CREATININE-BSD FMLA CKD-EPI: 54 ML/MIN/1.73 M 2
GLUCOSE SERPL-MCNC: 127 MG/DL (ref 65–99)
HCT VFR BLD AUTO: 26.1 % (ref 37–47)
HGB BLD-MCNC: 8.9 G/DL (ref 12–16)
LYMPHOCYTES # BLD AUTO: 0.42 K/UL (ref 1–4.8)
LYMPHOCYTES NFR BLD: 21.9 % (ref 22–41)
MANUAL DIFF BLD: NORMAL
MCH RBC QN AUTO: 27.6 PG (ref 27–33)
MCHC RBC AUTO-ENTMCNC: 34.1 G/DL (ref 32.2–35.5)
MCV RBC AUTO: 80.8 FL (ref 81.4–97.8)
METAMYELOCYTES NFR BLD MANUAL: 1.8 %
MICROCYTES BLD QL SMEAR: ABNORMAL
MONOCYTES # BLD AUTO: 0.03 K/UL (ref 0–0.85)
MONOCYTES NFR BLD AUTO: 1.7 % (ref 0–13.4)
MORPHOLOGY BLD-IMP: NORMAL
MYELOCYTES NFR BLD MANUAL: 0.9 %
NEUTROPHILS # BLD AUTO: 1.17 K/UL (ref 1.82–7.42)
NEUTROPHILS NFR BLD: 59.6 % (ref 44–72)
NEUTS BAND NFR BLD MANUAL: 1.8 % (ref 0–10)
NRBC # BLD AUTO: 0 K/UL
NRBC BLD-RTO: 0 /100 WBC (ref 0–0.2)
OVALOCYTES BLD QL SMEAR: NORMAL
PLATELET # BLD AUTO: 19 K/UL (ref 164–446)
PLATELET BLD QL SMEAR: NORMAL
PLATELETS.RETICULATED NFR BLD AUTO: 4.1 % (ref 0.6–13.1)
POIKILOCYTOSIS BLD QL SMEAR: NORMAL
POTASSIUM SERPL-SCNC: 3.7 MMOL/L (ref 3.6–5.5)
RBC # BLD AUTO: 3.23 M/UL (ref 4.2–5.4)
RBC BLD AUTO: PRESENT
SODIUM SERPL-SCNC: 140 MMOL/L (ref 135–145)
WBC # BLD AUTO: 1.9 K/UL (ref 4.8–10.8)

## 2025-04-02 PROCEDURE — 85027 COMPLETE CBC AUTOMATED: CPT

## 2025-04-02 PROCEDURE — 85007 BL SMEAR W/DIFF WBC COUNT: CPT

## 2025-04-02 PROCEDURE — 85055 RETICULATED PLATELET ASSAY: CPT

## 2025-04-02 PROCEDURE — 80048 BASIC METABOLIC PNL TOTAL CA: CPT

## 2025-04-02 ASSESSMENT — PAIN DESCRIPTION - PAIN TYPE: TYPE: ACUTE PAIN

## 2025-04-02 NOTE — DISCHARGE SUMMARY
Discharge Summary    CHIEF COMPLAINT ON ADMISSION  No chief complaint on file.      Reason for Admission  Oncology (Pancytopenia)     Admission Date  3/31/2025    CODE STATUS  Full Code    HPI & HOSPITAL COURSE   Ms. Nena Barrett is a  69 year old  female whose LMP is age 50. 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 She past surgical history significant for c/s, tubal ligation, left knee replacement and hand surgery.      She was in her usual state of health until she presented to Dr. Mayer complaining of post menopausal bleeding. Pap smear showed HSIL and a TVUS showed a mass effect from a lesion in the lower uterine segment measuring 8.1 x  4.7 x 5.15 cm. The uterus appears to be retroverted. Endometrium was difficult to appreciate with the retroverted uterus us heterogeneous and thickened uo to approximately 3.6 cm. There are 2 masses in the cervical region measuring approximately 1.97 x 1.3 x 1.8 cm and 1.8 x 1.07 x 1.12 cm. Limited views of the bladder unremarkable. Due to these findings, she was referred for  further evaluation.     24 Cervical biopsy= showed P16 positive, poorly differentiated Infiltrative malignancy with neuroendocrine features.     10/7/24 PET CT= Very large 15.8 cm intensely hypermetabolic uterine mass consistent with malignancy extending from the superior uterus throughout the endometrium and cervix. Additional smaller 4.0 cm hypermetabolic mass in the periphery of the upper right uterine fundus which may also represent malignancy, Several hypermetabolic retroperitoneal and pelvic lymph node  metastases. Very small intensely hypermetabolic left inguinal lymph node which may represent metastatic disease.      She was recommended to start neoadjuvant chemotherapy due to the aggressive nature of her cancer.   S/P Cycle # 1 10/ for Cisplatin/Etoposide chemotherapy.  S/P Cycle # 2 -2024  Cisplatin/Etoposide chemotherapy.   S/P Cycle # 3 12/11- 12/13/24 Cisplatin/Etoposide chemotherapy.      12/27/24 PET CT Response to treatment, Marked decrease in the previously seen very large intensely hypermetabolic uterine mass consistent with malignancy measuring up to 3.3, previously 15.8. Resolution of the additional smaller 4.0 cm hypermetabolic mass in the upper right uterine fundus. Decreased in enlarged hypermetabolic retroperitoneal and pelvic lymph nodes consistent with metastatic disease. Resolution of the very hypermetabolic left inguinal lymph node previously seen     S/P Cycle #4 1/8/251/10/25 Cisplatin/Etoposide chemotherapy after being postponed by 1 week due to a dental abscess treated with  antibiotics. She followed up with her dentist and underwent a root canal the week prior to he presenting for reevaluation  of cycle # 4 for Cisplatin/Etoposide chemotherapy via telehealth on 1/7/25.     She completed cycle #5 on 1/31/25. She is also s/p blood transfusion for Hgb 6.7 on 1/29/25.     She then started Cisplatin with concurrent XRT.   S/p C1 cisplatin 3/3/2025  S/p C2 cisplatin 3/10/2025  S/p C3 cisplatin 3/17/2025  C4 cisplatin due 3/24/2025 held for neutropenia and thrombocytopenia  C5 cisplatin due 3/31/2025 held for neutropenia and thrombocytopenia     Pretreatment labs from 3/31/2025 4 notable for platelets of 10,000, ANC of 0.38, and hemoglobin of 7.5.  Given her pancytopenia and concern for risk of spontaneous bleed with platelets of 10,000 she was directly admitted from [facility] to the cancer nursing unit the afternoon of 3/31/2025.  She was transfused with 1 pack of platelets and 1 unit of red blood cells.  She was started on daily Granix for ANC less than 1;  additionally her magnesium was also replaced.      Patient was seen and examined at bedside this morning in conjunction with Dr. Silverman.  She reports feeling slightly better after receiving her blood transfusion yesterday but continues  to have significant fatigue.  She notes decreased appetite and endorses intermittent nausea but denies vomiting.  She denies any vaginal bleeding or blood in her urine/other signs/symptoms of bleeding.  She endorses alternating constipation and diarrhea.  She was scheduled for a MRI ordered by Dr. Montenegro this afternoon and is concerned about missing this appointment.  She is otherwise feeling well.    Patient was admitted for the above reason.  Repeat labs revealed stable platelets, stable anemia, no neutropenia.  Patient was feeling very well with no new rashes, or signs of bleeding.  She had no other concerns or complaints.  Her physical exam was unremarkable.  Therefore she was discharged home in good and stable condition with close outpatient follow-up.  Plan is to repeat labs in 2 to 3 days for close follow-up.      The patient recovered much more quickly than anticipated on admission.    Discharge Date  4/2/2025    FOLLOW UP ITEMS POST DISCHARGE  Repeat labs in 2 - 3 days, order given  Monitor for signs and symptoms     DISCHARGE DIAGNOSES  Cervical cancer  Thrombocytopenia  Anemia  Hypomagnesemia  Hypokalemia      FOLLOW UP  Future Appointments   Date Time Provider Department Center   4/3/2025  9:45 AM [facility] None   4/4/2025  9:45 AM [facility] None   4/7/2025 12:00 PM [facility] None   4/8/2025  1:30 PM Ilda JEAN BAPTISTE M.D. [facility] None   4/9/2025  2:30 PM [facility] None   4/10/2025  8:30 AM Ilda JEAN BAPTISTE M.D. [facility] None   4/11/2025  2:30 PM [facility] None   4/14/2025  2:30 PM [facility]APY [facility] None   4/15/2025 10:30 AM Ilda JEAN BAPTISTE M.D. RADT None   4/17/2025  1:30 PM Ilda JEAN BAPTISTE M.D. RADT None   4/22/2025  1:30 PM Ilda JEAN BAPTISTE M.D. RAD[facility] None     Ambrose Silverman M.D.  [address] [place] [id number]  [phone]    Follow up in 6 week(s)        MEDICATIONS ON DISCHARGE     Medication List     "    CONTINUE taking these medications        Instructions   ondansetron 4 MG Tbdp  Commonly known as: Zofran ODT   Take 4 mg by mouth every 6 hours as needed for Nausea/Vomiting.  Dose: 4 mg     VITAMIN D-3 PO   Take 1 Tablet by mouth every day.  Dose: 1 Tablet            STOP taking these medications      dexamethasone 4 MG Tabs  Commonly known as: Decadron            ASK your doctor about these medications        Instructions   loperamide 2 MG Caps  Commonly known as: Imodium   Take 2 mg by mouth 4 times a day as needed for Diarrhea.  Dose: 2 mg     MAGNESIUM PO   Take 1 Tablet by mouth every day.  Dose: 1 Tablet     prochlorperazine 10 MG Tabs  Commonly known as: Compazine   Take 10 mg by mouth every 6 hours as needed for Nausea/Vomiting. Pt can only take at night per side effects  Dose: 10 mg              Allergies  Allergies   Allergen Reactions    Morphine Rash and Itching     Burning, \"red line\" from IV injection  \"Only had 1/2 dose\"        DIET  Orders Placed This Encounter   Procedures    Diet Order Diet: Regular     Standing Status:   Standing     Number of Occurrences:   1     Diet::   Regular [1]       ACTIVITY  As tolerated.  Weight bearing as tolerated    CONSULTATIONS  None     PROCEDURES  None    LABORATORY  Lab Results   Component Value Date    SODIUM 140 04/02/2025    POTASSIUM 3.7 04/02/2025    CHLORIDE 104 04/02/2025    CO2 25 04/02/2025    GLUCOSE 127 (H) 04/02/2025    BUN 15 04/02/2025    CREATININE 1.10 04/02/2025        Lab Results   Component Value Date    WBC 1.9 (LL) 04/02/2025    HEMOGLOBIN 8.9 (L) 04/02/2025    HEMATOCRIT 26.1 (L) 04/02/2025    PLATELETCT 19 (LL) 04/02/2025        Total time of the discharge process exceeds 25 minutes.  "

## 2025-04-02 NOTE — CARE PLAN
The patient is Stable - Low risk of patient condition declining or worsening    Shift Goals  Clinical Goals: Monitor labs for transfusion needs  Patient Goals: Sleep    Progress made toward(s) clinical / shift goals:    Problem: Pain - Standard  Goal: Alleviation of pain or a reduction in pain to the patient’s comfort goal  Description: Target End Date:  Prior to discharge or change in level of care  Problem: Knowledge Deficit - Standard  Goal: Patient and family/care givers will demonstrate understanding of plan of care, disease process/condition, diagnostic tests and medications  Description: Target End Date:  1-3 days or as soon as patient condition allows  Outcome: Progressing     Outcome: Progressing       Patient is not progressing towards the following goals:

## 2025-04-02 NOTE — DIETARY
"Nutrition Services: Initial Assessment     Day 2 of admit. Diane Barrett is 69 y.o., female with admitting DX of Pancytopenia (HCC) [D61.818].    Consult Received for: MST - Malnutrition Screening Tool    Current Hospital Problems List:    Principal Problem:    Pancytopenia (HCC) (POA: Yes)  Resolved Problems:    * No resolved hospital problems. *      Nutrition Assessment:      Height: 154.9 cm (5' 1\")  Weight: 89.3 kg (196 lb 13.9 oz)  Weight taken via: Stand Up Scale  BMI Calculated: 37.2  BMI Classification: Obesity Class 2       Weight Readings from Last 5 encounters:   Wt Readings from Last 30 Encounters:   03/31/25 89.3 kg (196 lb 13.9 oz)   04/01/25 89.3 kg (196 lb 13.9 oz)   03/26/25 90.1 kg (198 lb 10.2 oz)   03/19/25 93.4 kg (206 lb)   03/12/25 92.5 kg (204 lb)   03/05/25 91.3 kg (201 lb 4.8 oz)   03/31/25 89.1 kg (196 lb 6.9 oz)   03/24/25 90.2 kg (198 lb 13.7 oz)   03/17/25 91.4 kg (201 lb 8 oz)   03/10/25 90.3 kg (199 lb 1.2 oz)   03/03/25 90 kg (198 lb 6.6 oz)   02/14/25 91.1 kg (200 lb 13.4 oz)   02/19/25 89.8 kg (197 lb 15.6 oz)   01/31/25 93.7 kg (206 lb 9.1 oz)   01/30/25 93.3 kg (205 lb 11 oz)   01/29/25 90.9 kg (200 lb 6.4 oz)   01/10/25 94.8 kg (208 lb 15.9 oz)   01/09/25 94 kg (207 lb 3.7 oz)   01/08/25 92 kg (202 lb 13.2 oz)   12/13/24 95.9 kg (211 lb 6.7 oz)   12/12/24 95.8 kg (211 lb 3.2 oz)   12/11/24 94 kg (207 lb 3.7 oz)   11/22/24 95.1 kg (209 lb 10.5 oz)   11/21/24 97 kg (213 lb 13.5 oz)   11/20/24 94 kg (207 lb 3.7 oz)   11/19/24 95.3 kg (210 lb)   11/01/24 101 kg (221 lb 9 oz)   10/31/24 100 kg (220 lb 10.9 oz)   10/30/24 98.5 kg (217 lb 2.5 oz)   10/17/18 97.1 kg (214 lb 1.1 oz)         Objective:   Pertinent Medical Hx:  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 Per admission nutrition screen, pt reports 14-23 lb weight loss in past 6 months and eating poorly due to decreased appetite. Per flowsheets pt with poor-fair intakes:  <25% intake x 1 meal  25-50% intake x 3 meals    Noted pt was seen by oncology RD in December 2024. At that time pt reported dislike of oral supplements. RD will not order oral supplement at this time. Would encourage pt to aim for small, frequent meals, as well as to incorporate snacks and calorie dense foods to prevent further weight loss.      Nutrition Focused Physical Exam (NFPE)  Weight Loss: Per chart review, pt with 2.9% weight loss in 3 months and 9.3% weight loss in 6 months; notable but not clinically significant. RD Suspects this change related to reports of decreased appetite.  Muscle Mass: Unable to identify at this time; RD working remotely  Subcutaneous Fat: Unable to identify at this time; RD working remotely  Fluid Accumulation: None documented  Reduced  Strength: N/A in acute care setting.    Nutrition Diagnosis:      Increased nutrient Needs related to increased demand for nutrients as evidenced by cervical cancer undergoing treatment.        Based on RD assessment at this time, Unable to fully assess for malnutrition at this time    Nutrition Interventions:      Continue regular diet as ordered.  Patient aware of active plan of care as appropriate.       Nutrition Monitoring and Evaluation:      Monitor nutrition POC, goal for >50% intake from meals and supplements.  Additional fluids per MD/DO  Monitor vital signs pertinent to nutrition.    RD following and will provide updated recommendations as indicated.      Samantha Thomas R.D.                                         [facility]/AND CRITERIA FOR MALNUTRITION

## 2025-04-02 NOTE — DISCHARGE INSTRUCTIONS
Monitor for signs of bleeding or new rash   Repeat labs on Friday, order given   Follow up with Dr. Menendez for brachytherapy   Return to [facility] 4 weeks after completion of above   Call with any questions or concerns

## 2025-04-03 ENCOUNTER — PATIENT OUTREACH (OUTPATIENT)
Dept: ONCOLOGY | Facility: MEDICAL CENTER | Age: 70
End: 2025-04-03
Payer: MEDICARE

## 2025-04-04 NOTE — PROGRESS NOTES
On April 4, 2025, Oncology Social Worker Jane Song contacted pt. via telephone to inform her reservations had been modified with the changes she requested.  Pt. thanked RADHA Song for her assistance.

## 2025-04-04 NOTE — PROGRESS NOTES
On April 3, 2025, Oncology Social Worker Jane Song spoke with pt. via telephone.  Pt. shared her lodging requests and shared she had contacted the [facility] at [facility] to ensure they had rooms.  RADHA Song explained to pt. to leave voicemail message with dates when she calls RADHA Song and then OSW Duncan can make those reservations.  RADHA Song explained pt. cannot call the [facility] directly to add additional nights when the kieran is being utilized to make reservations.  Pt. agreed.    Pt. requesting the following changes    4/7/2025-4/11/2025-Reservation #[id number] -4 nights  4/14-4/18/2025-Reservation #[id number] nights  4/21-4/23/2025-Reservation #[id number] nights    OSEVA Song informed pt. she would make these changes and would call her back once those reservations have been secured.

## 2025-04-07 ENCOUNTER — OUTPATIENT INFUSION SERVICES (OUTPATIENT)
Dept: ONCOLOGY | Facility: MEDICAL CENTER | Age: 70
End: 2025-04-07
Attending: RADIOLOGY
Payer: MEDICARE

## 2025-04-07 VITALS
RESPIRATION RATE: 18 BRPM | BODY MASS INDEX: 36.75 KG/M2 | HEART RATE: 83 BPM | HEIGHT: 61 IN | OXYGEN SATURATION: 98 % | DIASTOLIC BLOOD PRESSURE: 75 MMHG | TEMPERATURE: 98 F | SYSTOLIC BLOOD PRESSURE: 135 MMHG | WEIGHT: 194.67 LBS

## 2025-04-07 DIAGNOSIS — C53.8 MALIGNANT NEOPLASM OVERLAPPING CERVIX UTERI SITE (HCC): ICD-10-CM

## 2025-04-07 DIAGNOSIS — T45.1X5A ANEMIA ASSOCIATED WITH CHEMOTHERAPY: ICD-10-CM

## 2025-04-07 DIAGNOSIS — D64.81 ANEMIA ASSOCIATED WITH CHEMOTHERAPY: ICD-10-CM

## 2025-04-07 LAB
ABO GROUP BLD: ABNORMAL
BARCODED ABORH UBTYP: 6200
BARCODED ABORH UBTYP: 8400
BARCODED PRD CODE UBPRD: ABNORMAL
BARCODED PRD CODE UBPRD: NORMAL
BARCODED UNIT NUM UBUNT: ABNORMAL
BARCODED UNIT NUM UBUNT: NORMAL
BLD GP AB SCN SERPL QL: ABNORMAL
COMPONENT P 8504P: NORMAL
COMPONENT R 8504R: ABNORMAL
PRODUCT TYPE UPROD: ABNORMAL
PRODUCT TYPE UPROD: NORMAL
RH BLD: ABNORMAL
UNIT STATUS USTAT: ABNORMAL
UNIT STATUS USTAT: NORMAL

## 2025-04-07 PROCEDURE — A9270 NON-COVERED ITEM OR SERVICE: HCPCS | Performed by: RADIOLOGY

## 2025-04-07 PROCEDURE — P9016 RBC LEUKOCYTES REDUCED: HCPCS

## 2025-04-07 PROCEDURE — 86850 RBC ANTIBODY SCREEN: CPT

## 2025-04-07 PROCEDURE — 86900 BLOOD TYPING SEROLOGIC ABO: CPT

## 2025-04-07 PROCEDURE — 700102 HCHG RX REV CODE 250 W/ 637 OVERRIDE(OP): Performed by: RADIOLOGY

## 2025-04-07 PROCEDURE — 86923 COMPATIBILITY TEST ELECTRIC: CPT

## 2025-04-07 PROCEDURE — P9034 PLATELETS, PHERESIS: HCPCS

## 2025-04-07 PROCEDURE — 306780 HCHG STAT FOR TRANSFUSION PER CASE

## 2025-04-07 PROCEDURE — 86901 BLOOD TYPING SEROLOGIC RH(D): CPT

## 2025-04-07 PROCEDURE — 36430 TRANSFUSION BLD/BLD COMPNT: CPT

## 2025-04-07 PROCEDURE — A4212 NON CORING NEEDLE OR STYLET: HCPCS

## 2025-04-07 RX ORDER — ACETAMINOPHEN 325 MG/1
650 TABLET ORAL PRN
Status: CANCELLED | OUTPATIENT
Start: 2025-04-07

## 2025-04-07 RX ORDER — HYDROCORTISONE SODIUM SUCCINATE 100 MG/2ML
100 INJECTION INTRAMUSCULAR; INTRAVENOUS PRN
Status: CANCELLED | OUTPATIENT
Start: 2025-04-07

## 2025-04-07 RX ORDER — ACETAMINOPHEN 325 MG/1
650 TABLET ORAL ONCE
Status: CANCELLED | OUTPATIENT
Start: 2025-04-07

## 2025-04-07 RX ORDER — DIPHENHYDRAMINE HCL 25 MG
25 TABLET ORAL ONCE
Status: CANCELLED | OUTPATIENT
Start: 2025-04-07 | End: 2025-04-07

## 2025-04-07 RX ORDER — ACETAMINOPHEN 325 MG/1
650 TABLET ORAL ONCE
Status: COMPLETED | OUTPATIENT
Start: 2025-04-07 | End: 2025-04-07

## 2025-04-07 RX ORDER — DIPHENHYDRAMINE HYDROCHLORIDE 50 MG/ML
25 INJECTION, SOLUTION INTRAMUSCULAR; INTRAVENOUS PRN
Status: CANCELLED | OUTPATIENT
Start: 2025-04-07

## 2025-04-07 RX ORDER — DIPHENHYDRAMINE HCL 25 MG
25 TABLET ORAL ONCE
Status: COMPLETED | OUTPATIENT
Start: 2025-04-07 | End: 2025-04-07

## 2025-04-07 RX ADMIN — ACETAMINOPHEN 650 MG: 325 TABLET ORAL at 11:40

## 2025-04-07 RX ADMIN — DIPHENHYDRAMINE HYDROCHLORIDE 25 MG: 25 TABLET ORAL at 11:41

## 2025-04-07 ASSESSMENT — FIBROSIS 4 INDEX: FIB4 SCORE: 21.79

## 2025-04-08 ENCOUNTER — HOSPITAL ENCOUNTER (OUTPATIENT)
Facility: MEDICAL CENTER | Age: 70
End: 2025-04-08
Attending: RADIOLOGY | Admitting: RADIOLOGY
Payer: MEDICARE

## 2025-04-08 ENCOUNTER — APPOINTMENT (OUTPATIENT)
Dept: RADIOLOGY | Facility: MEDICAL CENTER | Age: 70
End: 2025-04-08
Attending: RADIOLOGY
Payer: MEDICARE

## 2025-04-08 ENCOUNTER — ANESTHESIA (OUTPATIENT)
Dept: SURGERY | Facility: MEDICAL CENTER | Age: 70
End: 2025-04-08
Payer: MEDICARE

## 2025-04-08 ENCOUNTER — HOSPITAL ENCOUNTER (OUTPATIENT)
Dept: RADIATION ONCOLOGY | Facility: MEDICAL CENTER | Age: 70
End: 2025-04-08

## 2025-04-08 ENCOUNTER — ANESTHESIA EVENT (OUTPATIENT)
Dept: SURGERY | Facility: MEDICAL CENTER | Age: 70
End: 2025-04-08
Payer: MEDICARE

## 2025-04-08 VITALS
RESPIRATION RATE: 16 BRPM | HEIGHT: 60 IN | BODY MASS INDEX: 37.87 KG/M2 | OXYGEN SATURATION: 95 % | DIASTOLIC BLOOD PRESSURE: 74 MMHG | WEIGHT: 192.9 LBS | SYSTOLIC BLOOD PRESSURE: 151 MMHG | TEMPERATURE: 97.4 F | HEART RATE: 92 BPM

## 2025-04-08 DIAGNOSIS — C53.8 MALIGNANT NEOPLASM OVERLAPPING CERVIX UTERI SITE (HCC): ICD-10-CM

## 2025-04-08 LAB
ANISOCYTOSIS BLD QL SMEAR: ABNORMAL
BARCODED ABORH UBTYP: 5100
BARCODED PRD CODE UBPRD: NORMAL
BARCODED UNIT NUM UBUNT: NORMAL
BASOPHILS # BLD AUTO: 0 % (ref 0–1.8)
BASOPHILS # BLD: 0 K/UL (ref 0–0.12)
CHEMOTHERAPY INFUSION START DATE: NORMAL
CHEMOTHERAPY RECORDS: 6 GY
CHEMOTHERAPY RECORDS: 600 CGY
CHEMOTHERAPY RECORDS: NORMAL
CHEMOTHERAPY RX CANCER: NORMAL
COMMENT NL1176: NORMAL
COMPONENT P 8504P: NORMAL
DATE 1ST CHEMO CANCER: NORMAL
EOSINOPHIL # BLD AUTO: 0.03 K/UL (ref 0–0.51)
EOSINOPHIL NFR BLD: 2.5 % (ref 0–6.9)
ERYTHROCYTE [DISTWIDTH] IN BLOOD BY AUTOMATED COUNT: 44.7 FL (ref 35.9–50)
HCT VFR BLD AUTO: 24.6 % (ref 37–47)
HGB BLD-MCNC: 8.2 G/DL (ref 12–16)
LYMPHOCYTES # BLD AUTO: 0.36 K/UL (ref 1–4.8)
LYMPHOCYTES NFR BLD: 33.1 % (ref 22–41)
MANUAL DIFF BLD: NORMAL
MCH RBC QN AUTO: 27.3 PG (ref 27–33)
MCHC RBC AUTO-ENTMCNC: 33.3 G/DL (ref 32.2–35.5)
MCV RBC AUTO: 82 FL (ref 81.4–97.8)
MICROCYTES BLD QL SMEAR: ABNORMAL
MONOCYTES # BLD AUTO: 0.02 K/UL (ref 0–0.85)
MONOCYTES NFR BLD AUTO: 1.6 % (ref 0–13.4)
MORPHOLOGY BLD-IMP: NORMAL
MYELOCYTES NFR BLD MANUAL: 0.8 %
NEUTROPHILS # BLD AUTO: 0.68 K/UL (ref 1.82–7.42)
NEUTROPHILS NFR BLD: 61.2 % (ref 44–72)
NEUTS BAND NFR BLD MANUAL: 0.8 % (ref 0–10)
NRBC # BLD AUTO: 0 K/UL
NRBC BLD-RTO: 0 /100 WBC (ref 0–0.2)
OVALOCYTES BLD QL SMEAR: NORMAL
PLATELET # BLD AUTO: 52 K/UL (ref 164–446)
PLATELET BLD QL SMEAR: NORMAL
PLATELETS.RETICULATED NFR BLD AUTO: 2.5 % (ref 0.6–13.1)
PMV BLD AUTO: 10.2 FL (ref 9–12.9)
POIKILOCYTOSIS BLD QL SMEAR: NORMAL
PRODUCT TYPE UPROD: NORMAL
RAD ONC ARIA COURSE LAST TREATMENT DATE: NORMAL
RAD ONC ARIA COURSE TREATMENT ELAPSED DAYS: NORMAL
RAD ONC ARIA REFERENCE POINT DOSAGE GIVEN TO DATE: 5.92 GY
RAD ONC ARIA REFERENCE POINT DOSAGE GIVEN TO DATE: 6 GY
RAD ONC ARIA REFERENCE POINT DOSAGE GIVEN TO DATE: 6.02 GY
RAD ONC ARIA REFERENCE POINT DOSAGE GIVEN TO DATE: 6.03 GY
RAD ONC ARIA REFERENCE POINT DOSAGE GIVEN TO DATE: 6.06 GY
RAD ONC ARIA REFERENCE POINT ID: NORMAL
RAD ONC ARIA REFERENCE POINT SESSION DOSAGE GIVEN: 5.92 GY
RAD ONC ARIA REFERENCE POINT SESSION DOSAGE GIVEN: 6 GY
RAD ONC ARIA REFERENCE POINT SESSION DOSAGE GIVEN: 6.02 GY
RAD ONC ARIA REFERENCE POINT SESSION DOSAGE GIVEN: 6.03 GY
RAD ONC ARIA REFERENCE POINT SESSION DOSAGE GIVEN: 6.06 GY
RBC # BLD AUTO: 3 M/UL (ref 4.2–5.4)
RBC BLD AUTO: PRESENT
TOXIC GRANULES BLD QL SMEAR: NORMAL
UNIT STATUS USTAT: NORMAL
WBC # BLD AUTO: 1.1 K/UL (ref 4.8–10.8)

## 2025-04-08 PROCEDURE — 85027 COMPLETE CBC AUTOMATED: CPT

## 2025-04-08 PROCEDURE — 160047 HCHG PACU  - EA ADDL 30 MINS PHASE II: Performed by: RADIOLOGY

## 2025-04-08 PROCEDURE — 160029 HCHG SURGERY MINUTES - 1ST 30 MINS LEVEL 4: Performed by: RADIOLOGY

## 2025-04-08 PROCEDURE — 700105 HCHG RX REV CODE 258: Performed by: RADIOLOGY

## 2025-04-08 PROCEDURE — 57155 INSERT UTERI TANDEM/OVOIDS: CPT | Performed by: RADIOLOGY

## 2025-04-08 PROCEDURE — 36430 TRANSFUSION BLD/BLD COMPNT: CPT

## 2025-04-08 PROCEDURE — 160046 HCHG PACU - 1ST 60 MINS PHASE II: Performed by: RADIOLOGY

## 2025-04-08 PROCEDURE — 700102 HCHG RX REV CODE 250 W/ 637 OVERRIDE(OP): Performed by: RADIOLOGY

## 2025-04-08 PROCEDURE — 77333 RADIATION TREATMENT AID(S): CPT | Performed by: RADIOLOGY

## 2025-04-08 PROCEDURE — 160009 HCHG ANES TIME/MIN: Performed by: RADIOLOGY

## 2025-04-08 PROCEDURE — 77290 THER RAD SIMULAJ FIELD CPLX: CPT | Performed by: RADIOLOGY

## 2025-04-08 PROCEDURE — 160035 HCHG PACU - 1ST 60 MINS PHASE I: Performed by: RADIOLOGY

## 2025-04-08 PROCEDURE — 72170 X-RAY EXAM OF PELVIS: CPT

## 2025-04-08 PROCEDURE — 160002 HCHG RECOVERY MINUTES (STAT): Performed by: RADIOLOGY

## 2025-04-08 PROCEDURE — 160025 RECOVERY II MINUTES (STATS): Performed by: RADIOLOGY

## 2025-04-08 PROCEDURE — A9270 NON-COVERED ITEM OR SERVICE: HCPCS | Performed by: RADIOLOGY

## 2025-04-08 PROCEDURE — 700111 HCHG RX REV CODE 636 W/ 250 OVERRIDE (IP): Performed by: ANESTHESIOLOGY

## 2025-04-08 PROCEDURE — 49411 INS MARK ABD/PEL FOR RT PERQ: CPT | Performed by: RADIOLOGY

## 2025-04-08 PROCEDURE — C1717 BRACHYTX, NON-STR,HDR IR-192: HCPCS | Performed by: RADIOLOGY

## 2025-04-08 PROCEDURE — A4648 IMPLANTABLE TISSUE MARKER: HCPCS | Performed by: RADIOLOGY

## 2025-04-08 PROCEDURE — 160048 HCHG OR STATISTICAL LEVEL 1-5: Performed by: RADIOLOGY

## 2025-04-08 PROCEDURE — 77295 3-D RADIOTHERAPY PLAN: CPT | Performed by: RADIOLOGY

## 2025-04-08 PROCEDURE — P9034 PLATELETS, PHERESIS: HCPCS

## 2025-04-08 PROCEDURE — 77295 3-D RADIOTHERAPY PLAN: CPT | Mod: 26 | Performed by: RADIOLOGY

## 2025-04-08 PROCEDURE — 160015 HCHG STAT PREOP MINUTES: Performed by: RADIOLOGY

## 2025-04-08 PROCEDURE — 700117 HCHG RX CONTRAST REV CODE 255: Performed by: RADIOLOGY

## 2025-04-08 PROCEDURE — C1755 CATHETER, INTRASPINAL: HCPCS | Performed by: RADIOLOGY

## 2025-04-08 PROCEDURE — 76000 FLUOROSCOPY <1 HR PHYS/QHP: CPT | Mod: 26,XU | Performed by: RADIOLOGY

## 2025-04-08 PROCEDURE — 85055 RETICULATED PLATELET ASSAY: CPT

## 2025-04-08 PROCEDURE — 160041 HCHG SURGERY MINUTES - EA ADDL 1 MIN LEVEL 4: Performed by: RADIOLOGY

## 2025-04-08 PROCEDURE — 77334 RADIATION TREATMENT AID(S): CPT | Performed by: RADIOLOGY

## 2025-04-08 PROCEDURE — 85007 BL SMEAR W/DIFF WBC COUNT: CPT

## 2025-04-08 PROCEDURE — 77771 HDR RDNCL NTRSTL/ICAV BRCHTX: CPT | Mod: 26 | Performed by: RADIOLOGY

## 2025-04-08 PROCEDURE — 77771 HDR RDNCL NTRSTL/ICAV BRCHTX: CPT | Performed by: RADIOLOGY

## 2025-04-08 PROCEDURE — 77370 RADIATION PHYSICS CONSULT: CPT | Performed by: RADIOLOGY

## 2025-04-08 PROCEDURE — 77290 THER RAD SIMULAJ FIELD CPLX: CPT | Mod: 26 | Performed by: RADIOLOGY

## 2025-04-08 PROCEDURE — 77333 RADIATION TREATMENT AID(S): CPT | Mod: 26 | Performed by: RADIOLOGY

## 2025-04-08 RX ORDER — IODIXANOL 270 MG/ML
INJECTION, SOLUTION INTRAVASCULAR
Status: DISCONTINUED
Start: 2025-04-08 | End: 2025-04-08 | Stop reason: HOSPADM

## 2025-04-08 RX ORDER — HYDROMORPHONE HYDROCHLORIDE 1 MG/ML
1 INJECTION, SOLUTION INTRAMUSCULAR; INTRAVENOUS; SUBCUTANEOUS
Status: DISCONTINUED | OUTPATIENT
Start: 2025-04-08 | End: 2025-04-08 | Stop reason: HOSPADM

## 2025-04-08 RX ORDER — DEXAMETHASONE SODIUM PHOSPHATE 4 MG/ML
INJECTION, SOLUTION INTRA-ARTICULAR; INTRALESIONAL; INTRAMUSCULAR; INTRAVENOUS; SOFT TISSUE PRN
Status: DISCONTINUED | OUTPATIENT
Start: 2025-04-08 | End: 2025-04-08 | Stop reason: SURG

## 2025-04-08 RX ORDER — BUPIVACAINE HYDROCHLORIDE 5 MG/ML
INJECTION, SOLUTION EPIDURAL; INTRACAUDAL; PERINEURAL
Status: COMPLETED
Start: 2025-04-08 | End: 2025-04-08

## 2025-04-08 RX ORDER — HALOPERIDOL 5 MG/ML
1 INJECTION INTRAMUSCULAR
Status: DISCONTINUED | OUTPATIENT
Start: 2025-04-08 | End: 2025-04-08 | Stop reason: HOSPADM

## 2025-04-08 RX ORDER — MEPERIDINE HYDROCHLORIDE 25 MG/ML
12.5 INJECTION INTRAMUSCULAR; INTRAVENOUS; SUBCUTANEOUS
Status: DISCONTINUED | OUTPATIENT
Start: 2025-04-08 | End: 2025-04-08 | Stop reason: HOSPADM

## 2025-04-08 RX ORDER — BACITRACIN ZINC 500 [USP'U]/G
OINTMENT TOPICAL
Status: DISCONTINUED
Start: 2025-04-08 | End: 2025-04-08 | Stop reason: HOSPADM

## 2025-04-08 RX ORDER — ONDANSETRON 2 MG/ML
4 INJECTION INTRAMUSCULAR; INTRAVENOUS
Status: DISCONTINUED | OUTPATIENT
Start: 2025-04-08 | End: 2025-04-08 | Stop reason: HOSPADM

## 2025-04-08 RX ORDER — BACITRACIN ZINC 500 [USP'U]/G
OINTMENT TOPICAL
Status: DISCONTINUED | OUTPATIENT
Start: 2025-04-08 | End: 2025-04-08 | Stop reason: HOSPADM

## 2025-04-08 RX ORDER — OXYCODONE HCL 5 MG/5 ML
5 SOLUTION, ORAL ORAL
Status: DISCONTINUED | OUTPATIENT
Start: 2025-04-08 | End: 2025-04-08 | Stop reason: HOSPADM

## 2025-04-08 RX ORDER — CEFAZOLIN SODIUM 1 G/3ML
INJECTION, POWDER, FOR SOLUTION INTRAMUSCULAR; INTRAVENOUS PRN
Status: DISCONTINUED | OUTPATIENT
Start: 2025-04-08 | End: 2025-04-08 | Stop reason: SURG

## 2025-04-08 RX ORDER — SODIUM CHLORIDE, SODIUM LACTATE, POTASSIUM CHLORIDE, CALCIUM CHLORIDE 600; 310; 30; 20 MG/100ML; MG/100ML; MG/100ML; MG/100ML
INJECTION, SOLUTION INTRAVENOUS CONTINUOUS
Status: ACTIVE | OUTPATIENT
Start: 2025-04-08 | End: 2025-04-08

## 2025-04-08 RX ORDER — HYDROMORPHONE HYDROCHLORIDE 1 MG/ML
0.4 INJECTION, SOLUTION INTRAMUSCULAR; INTRAVENOUS; SUBCUTANEOUS
Status: DISCONTINUED | OUTPATIENT
Start: 2025-04-08 | End: 2025-04-08 | Stop reason: HOSPADM

## 2025-04-08 RX ORDER — OXYCODONE HCL 5 MG/5 ML
10 SOLUTION, ORAL ORAL
Status: DISCONTINUED | OUTPATIENT
Start: 2025-04-08 | End: 2025-04-08 | Stop reason: HOSPADM

## 2025-04-08 RX ORDER — MIDAZOLAM HYDROCHLORIDE 1 MG/ML
1 INJECTION INTRAMUSCULAR; INTRAVENOUS
Status: DISCONTINUED | OUTPATIENT
Start: 2025-04-08 | End: 2025-04-08 | Stop reason: HOSPADM

## 2025-04-08 RX ORDER — DIPHENHYDRAMINE HYDROCHLORIDE 50 MG/ML
12.5 INJECTION, SOLUTION INTRAMUSCULAR; INTRAVENOUS
Status: DISCONTINUED | OUTPATIENT
Start: 2025-04-08 | End: 2025-04-08 | Stop reason: HOSPADM

## 2025-04-08 RX ORDER — BUPIVACAINE HYDROCHLORIDE 5 MG/ML
INJECTION, SOLUTION EPIDURAL; INTRACAUDAL; PERINEURAL PRN
Status: DISCONTINUED | OUTPATIENT
Start: 2025-04-08 | End: 2025-04-08 | Stop reason: SURG

## 2025-04-08 RX ORDER — IODIXANOL 270 MG/ML
INJECTION, SOLUTION INTRAVASCULAR
Status: DISCONTINUED | OUTPATIENT
Start: 2025-04-08 | End: 2025-04-08 | Stop reason: HOSPADM

## 2025-04-08 RX ORDER — HYDROMORPHONE HYDROCHLORIDE 1 MG/ML
0.2 INJECTION, SOLUTION INTRAMUSCULAR; INTRAVENOUS; SUBCUTANEOUS
Status: DISCONTINUED | OUTPATIENT
Start: 2025-04-08 | End: 2025-04-08 | Stop reason: HOSPADM

## 2025-04-08 RX ORDER — IPRATROPIUM BROMIDE AND ALBUTEROL SULFATE 2.5; .5 MG/3ML; MG/3ML
3 SOLUTION RESPIRATORY (INHALATION)
Status: DISCONTINUED | OUTPATIENT
Start: 2025-04-08 | End: 2025-04-08 | Stop reason: HOSPADM

## 2025-04-08 RX ADMIN — PROPOFOL 20 MG: 10 INJECTION, EMULSION INTRAVENOUS at 08:36

## 2025-04-08 RX ADMIN — PROPOFOL 20 MG: 10 INJECTION, EMULSION INTRAVENOUS at 08:18

## 2025-04-08 RX ADMIN — BUPIVACAINE HYDROCHLORIDE 3 ML: 5 INJECTION, SOLUTION EPIDURAL; INTRACAUDAL at 08:01

## 2025-04-08 RX ADMIN — PROPOFOL 20 MG: 10 INJECTION, EMULSION INTRAVENOUS at 08:27

## 2025-04-08 RX ADMIN — FENTANYL CITRATE 25 MCG: 50 INJECTION, SOLUTION INTRAMUSCULAR; INTRAVENOUS at 08:01

## 2025-04-08 RX ADMIN — CEFAZOLIN 2 G: 1 INJECTION, POWDER, FOR SOLUTION INTRAMUSCULAR; INTRAVENOUS at 08:04

## 2025-04-08 RX ADMIN — DEXAMETHASONE SODIUM PHOSPHATE 8 MG: 4 INJECTION INTRA-ARTICULAR; INTRALESIONAL; INTRAMUSCULAR; INTRAVENOUS; SOFT TISSUE at 08:13

## 2025-04-08 RX ADMIN — SODIUM CHLORIDE, POTASSIUM CHLORIDE, SODIUM LACTATE AND CALCIUM CHLORIDE: 600; 310; 30; 20 INJECTION, SOLUTION INTRAVENOUS at 07:47

## 2025-04-08 RX ADMIN — PROPOFOL 20 MG: 10 INJECTION, EMULSION INTRAVENOUS at 08:12

## 2025-04-08 RX ADMIN — PROPOFOL 20 MG: 10 INJECTION, EMULSION INTRAVENOUS at 08:31

## 2025-04-08 RX ADMIN — PROPOFOL 20 MG: 10 INJECTION, EMULSION INTRAVENOUS at 08:06

## 2025-04-08 RX ADMIN — PROPOFOL 20 MG: 10 INJECTION, EMULSION INTRAVENOUS at 08:24

## 2025-04-08 ASSESSMENT — FIBROSIS 4 INDEX: FIB4 SCORE: 21.79

## 2025-04-08 ASSESSMENT — PAIN DESCRIPTION - PAIN TYPE
TYPE: SURGICAL PAIN

## 2025-04-08 ASSESSMENT — PAIN SCALES - GENERAL: PAIN_LEVEL: 0

## 2025-04-08 NOTE — ANESTHESIA PREPROCEDURE EVALUATION
Case: [id number] Date/Time: 04/08/25 0715    Procedure: HIGH DOSE GYNECOLOGICAL BRACHYTHERAPY-INTERSTITIAL    Pre-op diagnosis: CERVICAL CANCER    Location: [facility] ROOM 21 / SURGERY SAME DAY [facility]    Surgeons: Ilda JEAN BAPTISTE M.D.            Relevant Problems   CARDIAC   (positive) Vascular injury      GI   (positive) Peptic ulcer disease       Physical Exam    Airway   Mallampati: III  TM distance: >3 FB  Neck ROM: limited       Cardiovascular - normal exam  Rhythm: regular  Rate: normal  (-) murmur     Dental - normal exam           Pulmonary - normal exam  Breath sounds clear to auscultation     Abdominal   (+) obese     Neurological - normal exam                   Anesthesia Plan    ASA 3       Plan - spinal   Neuraxial block will be primary anesthetic            Induction: intravenous    Postoperative Plan: Postoperative administration of opioids is intended.    Pertinent diagnostic labs and testing reviewed    Informed Consent:    Anesthetic plan and risks discussed with patient.    Use of blood products discussed with: patient whom consented to blood products.

## 2025-04-08 NOTE — OR NURSING
"1056-Pt arrive to PACU from radiology. Report received from RN. KORY. Pt denies pain. Pt with no bleeding from vaginal area. Pt able to feel touch \"slightly\" to above both knees bilateral. Pt BLE pulses 2+.Pt port accessed, SL.    1117- Pt to RA.     1130-Pt able to wiggle toes and feel sensation throughout legs. Pt off slider board, small BM noted. Pt cleaned, repositioned to comfort. Pt denies pain, tolerating sips of water.    1138-Spouse to bedside.    1145-Order received from Dr Diaz to leave port accessed.     1220 Patient able to wiggle toes and feel sensation throughout legs. Unable to fully lift up legs, no sensation to have to urinate. Encouraging PO fluids.     1240 Confirm with Dr. Diaz that packing x2 was pulled when weir was pulled and that she does need to do a clear liquid diet the night before.     1248-DC instructions reviewed with pt and spouse, including CLD night before procedure. Pt educated about string from vagina and to leave in place. All questions answered, verbalized understanding.    1252- Pt with full mobility to BLE, stating believes able to void at this time. Pt to BR with CNA via bed.    1303-Pt able to void without difficulty. Pt dressed, ambulated to bay. Pt discharged via wc with CNA and . All belongings with pt. Pt dc with port accessed, per Dr Diaz. Bags and tape given to patient for shower.    1425-Call to spouse's phone, message left for pt to do enema prior to next procedure    "

## 2025-04-08 NOTE — H&P
RADIATION ONCOLOGY CONSULT    Patient name:  Diane Barrett    Primary Physician:  Brendan Cho M.D. MRN: [id number]  CSN: [id number]   Referring physician:  Ambrose Silverman M.D.  : 1955, 69 y.o.     DATE OF SERVICE: 2025    IDENTIFICATION: A 69 y.o. 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 on 2025  Histopathologic type: Small cell carcinoma, NOS  Stage prefix: Initial diagnosis  Para-aortic status: Positive  Para-aortic zackary method of assessment: PET  Histologic grade (G): G3  Histologic grading system: 3 grade system  P16 overexpression: Positive        She is here at the kind request of Ambrose Chin M.D.        HISTORY OF PRESENT ILLNESS:  Subjective     69-year-old  Ab1 female LMP at age 50.  Former smoker quit in .  Never drank alcohol.     She presents with postmenopausal bleeding in 2020 for approximately 1 week duration.     Imaging demonstrated a large 8 x 4 x 5 cm pelvic mass.     2024 Dr. 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     10/7/2024 PET/CT demonstrated 15.8 cm intensely hypermetabolic uterine mass consistent with malignancy, smaller 4 cm hypermetabolic retroperitoneal and pelvic lymph nodes noted.  Very small intensely hypermetabolic left inguinal lymph node.     Started platinum etoposide chemotherapy 10/30/2024.  Completed 3 cycles and follow-up PET 2024 demonstrated marked decrease in uterine mass measuring 3.3 cm from 15.8 cm.  Resolution of small mass upper right uterine fundus.  Decrease in enlarged retroperitoneal and pelvic adenopathy.  Resolution of hypermetabolic left inguinal lymph node.     Completed 5 cycles systemic therapy on 2025.  Had's fairly  significant anemia and was felt not to tolerate an additional 6 cycle.     Referred for chemoradiation followed by brachytherapy.  Plan is for additional outback 4 cycles carbo Taxol chemotherapy.     Currently she reports no complaints.  Resolution of vaginal bleeding after first cycle of cis etoposide.    Completed approximately 4 weeks of external beam radiotherapy.  External beam on hold secondary to pancytopenia.    She is being transfused with platelets and will proceed with brachytherapy.           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 at SURGERY [facility]    FULL THICKNESS SKIN GRAFT  09/17/2012    Performed by Shyam Orr M.D. at [facility]    IRRIGATION & DEBRIDEMENT ORTHO  03/12/2012    Performed by SHYAM ORR at [facility]    FINGER OR HAND INCISION AND DRAINAGE  02/13/2012    Performed by SHYAM ORR at [facility]    LACERATION REPAIR  02/13/2012    Performed by SHYAM ORR at [facility]    CARPAL TUNNEL RELEASE  02/13/2012    Performed by SHYAM ORR at [facility]    MUSCLE REPAIR  02/13/2012    Performed by SHYAM ORR at [facility]    HAND  SURGERY  2012    left hand trauma    TUBAL LIGATION      PRIMARY C SECTION          KNEE ARTHROSCOPY Left     years prior to replacement    TONSILLECTOMY      as child       CURRENT MEDICATIONS:  Current Facility-Administered Medications   Medication Dose Route Frequency Provider Last Rate Last Admin    lactated ringers infusion   Intravenous Continuous Ilda JEAN BAPTISTE M.D.   New Bag at 25 0747    BACITRACIN ZINC 500 UNIT/GM EX OINT             IOHEXOL 240 MG/ML INJ SOLN             IODIXANOL 270 MG/ML IV SOLN             fentaNYL (Sublimaze) injection 25 mcg  25 mcg Intravenous Q2 MIN PRN Moises Mckee III, M.D.        Or    fentaNYL (Sublimaze) injection 50 mcg  50 mcg Intravenous Q2 MIN PRN Moises Mckee III, M.D.        HYDROmorphone (Dilaudid) injection 0.2 mg  0.2 mg Intravenous Q5 MIN PRN Moises Mckee III, M.D.        Or    HYDROmorphone (Dilaudid) injection 0.4 mg  0.4 mg Intravenous Q5 MIN PRN Moises Mckee III, M.D.        Or    HYDROmorphone (Dilaudid) injection 1 mg  1 mg Intravenous Q5 MIN PRN Moises Mckee III, M.D.        oxyCODONE (Roxicodone) oral solution 5 mg  5 mg Oral Once PRN Moises Mckee III, M.D.        Or    oxyCODONE (Roxicodone) oral solution 10 mg  10 mg Oral Once PRN Moises Mckee III, M.D.        meperidine (Demerol) injection 12.5 mg  12.5 mg Intravenous Q5 MIN PRN Moises Mckee III, M.D.        midazolam (Versed) injection 1 mg  1 mg Intravenous Q5 MIN PRN Moises Mckee III, M.D.        ondansetron (Zofran) syringe/vial injection 4 mg  4 mg Intravenous Once PRN Moises Mckee III, M.D.        haloperidol lactate (Haldol) injection 1 mg  1 mg Intravenous Q15 MIN PRN Moises Mckee III, M.D.        diphenhydrAMINE (Benadryl) injection 12.5 mg  12.5 mg Intravenous Q15 MIN PRN Moises Mckee III, M.D.        ipratropium-albuterol (DUONEB) nebulizer solution  3 mL Nebulization Q10 MIN PRN Moises Mckee III, M.D.           ALLERGIES:     Morphine    FAMILY HISTORY:    family history includes Breast Cancer in her sister; Cancer in her mother, sister, and another family member; Colorectal Cancer in her mother; Diabetes in her brother; Heart Attack in her brother and brother; Heart Disease in an other family member; Hyperlipidemia in her brother and brother; Hypertension in her brother, brother, sister, and another family member; Stroke in an other family member.    SOCIAL HISTORY:     reports that she quit smoking about 43 years ago. Her smoking use included cigarettes. She started smoking about 48 years ago. She has a 5 pack-year smoking history. She has never used smokeless tobacco. She reports that she does not drink alcohol and does not use drugs.    REVIEW OF SYSTEMS:    A complete review of systems taken. Pertinent items in HPI. All others negative.    PHYSICAL EXAM:    PERFORMANCE STATUS:      2/14/2025    10:19 AM   ECOG Performance Review   ECOG Performance Status Ambulatory and capable of all selfcare but unable to carry out any work activities.  Up and about more than 50% of waking hours         2/14/2025    10:20 AM   Karnofsky Score   Karnofsky Score 80     BP 91/69   Pulse 87   Temp 36.1 °C (97 °F) (Temporal)   Resp 14   Ht 1.524 m (5')   Wt 87.5 kg (192 lb 14.4 oz)   SpO2 98%   BMI 37.67 kg/m²   Physical Exam  Vitals and nursing note reviewed. Exam conducted with a chaperone present.   Constitutional:       General: She is not in acute distress.     Appearance: She is well-developed.   HENT:      Head: Normocephalic.   Eyes:      Conjunctiva/sclera: Conjunctivae normal.      Pupils: Pupils are equal, round, and reactive to light.   Cardiovascular:      Rate and Rhythm: Normal rate and regular rhythm.      Heart sounds: Normal heart sounds.   Pulmonary:      Effort: Pulmonary effort is normal.      Breath sounds: Normal breath sounds.   Abdominal:      General: Bowel sounds are normal.      Palpations: Abdomen is soft.    Genitourinary:     Exam position: Lithotomy position.      Vagina: Normal.      Cervix: Normal.      Comments: Cervical size approximately 3 cm without visible lesion.  Os patent.  Parametria uninvolved  Musculoskeletal:         General: No tenderness or deformity. Normal range of motion.      Cervical back: Normal range of motion and neck supple.   Lymphadenopathy:      Cervical: No cervical adenopathy.   Skin:     General: Skin is warm and dry.      Findings: No erythema.   Neurological:      Mental Status: She is alert and oriented to person, place, and time.      Cranial Nerves: No cranial nerve deficit.      Coordination: Coordination normal.   Psychiatric:         Behavior: Behavior normal.         Thought Content: Thought content normal.         Judgment: Judgment normal.          LABORATORY DATA:   Lab Results   Component Value Date/Time    WBC 1.9 (LL) 04/02/2025 04:10 AM    RBC 3.23 (L) 04/02/2025 04:10 AM    HEMOGLOBIN 8.9 (L) 04/02/2025 04:10 AM    HEMATOCRIT 26.1 (L) 04/02/2025 04:10 AM    MCV 80.8 (L) 04/02/2025 04:10 AM    MCH 27.6 04/02/2025 04:10 AM    MCHC 34.1 04/02/2025 04:10 AM    RDW 46.1 04/02/2025 04:10 AM    PLATELETCT 19 (LL) 04/02/2025 04:10 AM    MPV 11.2 03/13/2025 10:35 AM    NEUTSPOLYS 59.60 04/02/2025 04:10 AM    LYMPHOCYTES 21.90 (L) 04/02/2025 04:10 AM    MONOCYTES 1.70 04/02/2025 04:10 AM    EOSINOPHILS 11.40 (H) 04/02/2025 04:10 AM    BASOPHILS 0.90 04/02/2025 04:10 AM    HYPOCHROMIA 1+ 02/14/2025 12:42 PM    ANISOCYTOSIS 1+ 04/02/2025 04:10 AM      Lab Results   Component Value Date/Time    SODIUM 140 04/02/2025 04:10 AM    POTASSIUM 3.7 04/02/2025 04:10 AM    CHLORIDE 104 04/02/2025 04:10 AM    CO2 25 04/02/2025 04:10 AM    GLUCOSE 127 (H) 04/02/2025 04:10 AM    BUN 15 04/02/2025 04:10 AM    CREATININE 1.10 04/02/2025 04:10 AM           RADIOLOGY DATA:  MR-PELVIS-WITH & W/O AND SEQUENCES  Result Date: 4/1/2025  1.  Stable appearance of the cervix with hypoattenuation  anteriorly, unchanged from prior, likely posttreatment change. 2.  Stable presumed LEFT iliac lymph node showing only capsular enhancement, likely treated disease. 3.  No new adenopathy.    MR-PELVIS-WITH & W/O AND SEQUENCES  Result Date: 2/17/2025  There is a 1.8 x 1.4 cm hypoenhancing mass in the anterior lower uterine segment, likely residual mass. Mild irregularity of the anterior uterine wall but no involvement of the vagina. There is a 1.7 x 2.1 cm lobulated nonenhancing mass in the left internal iliac region. This demonstrates no uptake on recent PET CT, likely treated disease      IMPRESSION:    A 69 y.o. 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 on 2/14/2025  Histopathologic type: Small cell carcinoma, NOS  Stage prefix: Initial diagnosis  Para-aortic status: Positive  Para-aortic azckary method of assessment: PET  Histologic grade (G): G3  Histologic grading system: 3 grade system  P16 overexpression: Positive        RECOMMENDATIONS:   Plan on proceeding with tandem and ovoid brachytherapy for 5 fractions while external beam is on hold considering pancytopenia.  Plan on total 5 fractions given twice per week.  When blood counts improved will interdigitate external beam with brachytherapy.  Technical aspects benefits risks of brachytherapy reviewed with patient.  She understands wishes to proceed.    Thank you for the opportunity to participate in her care.  If any questions or comments, please do not hesitate in calling.    Ilda JEAN BAPTISTE M.D.  Electronically signed by: Ilda JEAN BAPTISTE M.D., 4/8/2025 9:04 AM  [phone]

## 2025-04-08 NOTE — OP REPORT
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 on 2/14/2025  Histopathologic type: Small cell carcinoma, NOS  Stage prefix: Initial diagnosis  Para-aortic status: Positive  Para-aortic zackary method of assessment: PET  Histologic grade (G): G3  Histologic grading system: 3 grade system  P16 overexpression: Positive      POSTOPERATIVE DIAGNOSIS: Same    PRIMARY SURGEON:   Moo Diaz MD     PROCEDURE: # 1, 15 degree tandem and small ovoids   insertion for brachytherapy.     ANESTHESIOLOGIST: Rylee    TYPE OF ANESTHESIA: Spinal     ESTIMATED BLOOD LOSS: 10 cc    DESCRIPTION OF OPERATIVE NOTE: Patient was prepped and draped in a sterile fashion and a Kevin catheter was inserted.  2 gold fudicial markers inserted into cervix to aid in radiation treatment planning.  Next, an exam under anesthesia was performed and cervix was approximately 3 cm in size.  Os visible.  No lesion noted involving the exocervix.  Vaginal fornices patent.  No parametrial induration.. Uterus sounded and 8 cm smitt sleeve inserted .  Next,a 15-degree tandem and small ovoids were inserted with careful visualization of the anatomy. Once appropriate positioning was verified using fluoroscopy, then vaginal packing was utilized to both hold the device in place  and maximize displacement of the  surrounding normal structures. Patient transferred to [facility]. Stabilizer board used to hold device in place.     DISPOSITION: Patient was released to recovery in good condition and will be sent to Radiation Oncology for CT planning and radiation delivery.     Ilda JEAN BAPTISTE M.D.  Electronically signed by: Ilda JEAN BAPTISTE M.D., 4/8/2025 9:00 AM  [phone]    ___________________________________   Ilda Diaz MD

## 2025-04-08 NOTE — PROCEDURES
Date of Service:  04/08/25    Referring Physician: No ref. 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 on 2/14/2025  Histopathologic type: Small cell carcinoma, NOS  Stage prefix: Initial diagnosis  Para-aortic status: Positive  Para-aortic zackary method of assessment: PET  Histologic grade (G): G3  Histologic grading system: 3 grade system  P16 overexpression: Positive       PRESCRIPTION:     Radiation Therapy Episodes       Active Episodes       Radiation Therapy: IMRT (3/3/2025)                   Radiation Treatments         Plan Last Treated On Elapsed Days Fractions Treated Prescribed Fraction Dose (cGy) Prescribed Total Dose (cGy)    HDR_1 4/8/2025 36 @ 04/08/2025 1 of 1 600 600    Pelvis 3/26/2025 23 @ 03/26/2025 18 of 25 240 6,000                  Reference Point Last Treated On Elapsed Days Most Recent Session Dose (cGy) Total Dose (cGy)    HDR 4/8/2025 36 @ 04/08/2025 600 600    Pelvis 3/26/2025 23 @ 03/26/2025 240 4,320    fx1 pt1 4/8/2025 36 @ 04/08/2025 602 602    fx1 pt2 4/8/2025 36 @ 04/08/2025 592 592    fx1 pt3 4/8/2025 36 @ 04/08/2025 606 606    fx1 pt4 4/8/2025 36 @ 04/08/2025 603 603                            TX NUMBER: 1    MARKER: [] Titanium   [x] Gold    SIMULATION GYN HDR BRACHYTHERAPY:    Patient had CT in department after placement of Gyn brachytherapy device in the OR. Please see separate OP note. Position verified and images transferred to [facility]achy[facility].    Plan generated and approved.    In Brachy suite HDR device was attached to the HDR unit via transfer catheters. The setup was verified by myself and physicist and treatment delivered.    I have personally reviewed the relevant data, performed the target localization, and determined all relevant factors for this patient’s simulation.

## 2025-04-08 NOTE — DISCHARGE INSTRUCTIONS
What to Expect Post Anesthesia    Rest and take it easy for the first 24 hours.  A responsible adult is recommended to remain with you during that time.  It is normal to feel sleepy.  We encourage you to not do anything that requires balance, judgment or coordination.    FOR 24 HOURS DO NOT:  Drive, operate machinery or run household appliances.  Drink beer or alcoholic beverages.  Make important decisions or sign legal documents.    To avoid nausea, slowly advance diet as tolerated, avoiding spicy or greasy foods for the first day.  Add more substantial food to your diet according to your provider's instructions.  Babies can be fed formula or breast milk as soon as they are hungry.  INCREASE FLUIDS AND FIBER TO AVOID CONSTIPATION.    MILD FLU-LIKE SYMPTOMS ARE NORMAL.  YOU MAY EXPERIENCE GENERALIZED MUSCLE ACHES, THROAT IRRITATION, HEADACHE AND/OR SOME NAUSEA.    If any questions arise, call your provider.  If your provider is not available, please feel free to call the Surgical Center at [phone].    MEDICATIONS: Resume taking daily medication.  Take prescribed pain medication with food.  If no medication is prescribed, you may take non-aspirin pain medication if needed.  PAIN MEDICATION CAN BE VERY CONSTIPATING.  Take a stool softener or laxative such as senokot, pericolace, or milk of magnesia if needed.    Last pain medication given at

## 2025-04-08 NOTE — OR NURSING
0846 received patient from OR. Report from Anesthesiologist and OR RN. Patient on 2L at 97 % O2. VSS. Monitor connected. No airway in place. S/P brachytherapy, weir clamped; device in place, no bleeding.    9497 patient transferred to Radiation with Rebecca STROUD

## 2025-04-08 NOTE — ANESTHESIA POSTPROCEDURE EVALUATION
Patient: Diane Barrett    Procedure Summary       Date: 04/08/25 Room / Location: [facility] ROOM 21 / SURGERY SAME DAY [facility]    Anesthesia Start: 0747 Anesthesia Stop: 0849    Procedure: HIGH DOSE GYNECOLOGICAL BRACHYTHERAPY-INTERSTITIAL (Cervix) Diagnosis: (CERVICAL CANCER)    Surgeons: Ilda JEAN BAPTISTE M.D. Responsible Provider: Moises Mckee III, M.D.    Anesthesia Type: spinal ASA Status: 3            Final Anesthesia Type: spinal  Last vitals  BP   Blood Pressure : 91/69    Temp   36.1 °C (97 °F)    Pulse   87   Resp   14    SpO2   98 %      Anesthesia Post Evaluation    Patient location during evaluation: PACU  Patient participation: complete - patient participated  Level of consciousness: awake and alert  Pain score: 0    Airway patency: patent  Anesthetic complications: no  Cardiovascular status: hemodynamically stable  Respiratory status: acceptable  Hydration status: euvolemic    PONV: none          No notable events documented.     Nurse Pain Score: 0 (NPRS)

## 2025-04-08 NOTE — PROGRESS NOTES
0850- Pt report from PACU RN.  Pt did recieve 1 unit platelets this am.  Pt A&O x 4, slightly drowsy.  b/p 82/43 then 91/69, HR 87, RR 14, O2 sat 97% 2L.  Pt denies pain, denies any BLE sensation, no BLE movement noted.  No bleeding at device site noted.  Weir in place and clamped.   0900- Pt transported to Sim  0910- In sim.  117/60, 82, 14, 93% 2L.  Pt continues to deny pain, BLE sensation or movement.    0925- In sim.  119/61, 78, 14, 96% 1L.    0930- To rm 7.  134/68, 78, 16, 96% 1L.  Remains A&O x 4.  Continues to deny any pain, or BLE sensation.  No BLE movement noted.  Sips of water provided.  SCD's in place, call light in reach.  Pt denies needs at this time.  0945- 131/71, 72, 12, 92% 1L.  Pt resting quietly with eyes closed.  1000- 131/71, 89, 12, 94% 1L.  Remains A&O x 4.  Continues to deny any pain, or BLE sensation.  No BLE movement noted.  Sips of water provided.  SCD's in place.  Pt to tx vault, setting up for tx.    1015- 154/74, 84, 14, 96% 1L.  In tx vault.   1020- tx start.  1034- tx end.  Monitoring ongoing throughout.  1045- 149/81, 85, 16, 96% 1L.  Remains A&O x 4.  Continues to deny any pain.  Pt states has near baseline sensation to R thigh, and slight sensation to L thigh.  Pt has slight gross motor movement to BLE, no fine motor movement.  .  1045- Device removed, and weir d/c'd both by Dr Diaz.  No bleeding at device site noted.    1100- Pt to PACU.  Report to PACU RN.  Pt requests d/c instruction be reviewed when her spouse is at bs.  PACU RN to review d/c'd instructions with pt & spouse.

## 2025-04-08 NOTE — ADDENDUM NOTE
Encounter addended by: Rebecca Johnson R.N. on: 4/8/2025 3:52 PM   Actions taken: Clinical Note Signed

## 2025-04-08 NOTE — ANESTHESIA TIME REPORT
Anesthesia Start and Stop Event Times       Date Time Event    4/8/2025 0714 Ready for Procedure     0747 Anesthesia Start     0849 Anesthesia Stop          Responsible Staff  04/08/25      Name Role Begin End    Moises Mckee III, M.D. Anesth 0747 0849          Overtime Reason:  no overtime (within assigned shift)    Comments:

## 2025-04-08 NOTE — ANESTHESIA PROCEDURE NOTES
"Spinal Block    Date/Time: 4/8/2025 7:56 AM    Performed by: Moises Mckee III, M.D.  Authorized by: Moises Mckee III, M.D.    Patient Location:  OR  Start Time:  4/8/2025 7:56 AM  End Time:  4/8/2025 8:01 AM  Reason for Block: primary anesthetic    patient identified, IV checked, site marked, risks and benefits discussed, surgical consent, monitors and equipment checked, pre-op evaluation and timeout performed    Patient Position:  Sitting  Prep: ChloraPrep, patient draped and sterile technique    Monitoring:  Blood pressure, continuous pulse oximetry and heart rate  Approach:  Midline  Location:  L2-3  Injection Technique:  Single-shot  Skin infiltration:  Lidocaine  Strength:  1%  Dose:  2ml  Needle Type:  Pencan  Needle Gauge:  25 G  CSF flowing pre/post injection:  Yes  Sensory Level:  T10   3.5\" spinal needle hubbed to yield spinal fluid      "

## 2025-04-08 NOTE — PROGRESS NOTES
Patient came into infusion independently. Orders and vitals reviewed, assessment done. Port accessed in sterile manner with blood return noted. COD collected. Consent signed. Pre medications given as ordered. 1 unit of platelets transfused as ordered with no adverse events. 1 unit of PRBCs transfused as ordered with no adverse events. Port flushed per [facility] policy and de-accessed. Patient left in stable condition with next appointment confirmed.

## 2025-04-09 ENCOUNTER — HOSPITAL ENCOUNTER (OUTPATIENT)
Dept: RADIATION ONCOLOGY | Facility: MEDICAL CENTER | Age: 70
End: 2025-04-09
Attending: RADIOLOGY
Payer: MEDICARE

## 2025-04-09 ENCOUNTER — HOSPITAL ENCOUNTER (OUTPATIENT)
Dept: RADIATION ONCOLOGY | Facility: MEDICAL CENTER | Age: 70
End: 2025-04-09

## 2025-04-09 VITALS
BODY MASS INDEX: 38.22 KG/M2 | SYSTOLIC BLOOD PRESSURE: 157 MMHG | OXYGEN SATURATION: 96 % | WEIGHT: 195.7 LBS | DIASTOLIC BLOOD PRESSURE: 83 MMHG | TEMPERATURE: 97.6 F | HEART RATE: 99 BPM

## 2025-04-09 DIAGNOSIS — D61.818 PANCYTOPENIA (HCC): ICD-10-CM

## 2025-04-09 LAB
CHEMOTHERAPY INFUSION START DATE: NORMAL
CHEMOTHERAPY RECORDS: 2.4 GY
CHEMOTHERAPY RECORDS: 6000 CGY
CHEMOTHERAPY RECORDS: NORMAL
CHEMOTHERAPY RX CANCER: NORMAL
DATE 1ST CHEMO CANCER: NORMAL
RAD ONC ARIA COURSE LAST TREATMENT DATE: NORMAL
RAD ONC ARIA COURSE TREATMENT ELAPSED DAYS: NORMAL
RAD ONC ARIA REFERENCE POINT DOSAGE GIVEN TO DATE: 45.6 GY
RAD ONC ARIA REFERENCE POINT ID: NORMAL
RAD ONC ARIA REFERENCE POINT SESSION DOSAGE GIVEN: 2.4 GY

## 2025-04-09 PROCEDURE — 77386 HCHG IMRT DELIVERY COMPLEX: CPT | Performed by: RADIOLOGY

## 2025-04-09 PROCEDURE — 77014 PR CT GUIDANCE PLACEMENT RAD THERAPY FIELDS: CPT | Mod: 26 | Performed by: RADIOLOGY

## 2025-04-09 ASSESSMENT — FIBROSIS 4 INDEX: FIB4 SCORE: 7.96

## 2025-04-09 ASSESSMENT — PAIN SCALES - GENERAL: PAINLEVEL_OUTOF10: NO PAIN

## 2025-04-09 NOTE — ON TREATMENT VISIT
ON TREATMENT  NOTE  RADIATION ONCOLOGY DEPARTMENT    Patient name:  Diane Barrett    Primary Physician:  Brendan Cho M.D. MRN: [id number]  CSN: [id number]   Referring physician:  Ambrose Silverman M.D.   : 1955, 69 y.o.     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 on 2025  Histopathologic type: Small cell carcinoma, NOS  Stage prefix: Initial diagnosis  Para-aortic status: Positive  Para-aortic zackary method of assessment: PET  Histologic grade (G): G3  Histologic grading system: 3 grade system  P16 overexpression: Positive      TREATMENT SUMMARY:  Course First Treatment Date 2025  Course Last Treatment Date 2025  Radiation Therapy Episodes       Active Episodes       Radiation Therapy: IMRT (3/3/2025)                   Radiation Treatments         Plan Last Treated On Elapsed Days Fractions Treated Prescribed Fraction Dose (cGy) Prescribed Total Dose (cGy)    HDR_1 2025 36 @ 2025 1 of 1 600 600    Pelvis 2025 37 @ 2025 19 of 25 240 6,000                  Reference Point Last Treated On Elapsed Days Most Recent Session Dose (cGy) Total Dose (cGy)    HDR 2025 36 @ 2025 600 600    Pelvis 2025 37 @ 2025 240 4,560    fx1 pt1 2025 36 @ 2025 602 602    fx1 pt2 2025 36 @ 2025 592 592    fx1 pt3 2025 36 @ 2025 606 606    fx1 pt4 2025 36 @ 2025 603 603                               SUBJECTIVE:  Tolerating well    VITAL SIGNS:      2025     2:56 PM 2025    12:45 PM 2025    12:30 PM 2025    12:15 PM 2025    12:00 PM 2025    11:45 AM 2025    11:30 AM   Vitals   SYSTOLIC 157 151 149 142 134 142 133   DIASTOLIC 83 74 75 82 75 74 67   Pulse 99 92 90 86 92 83 91   Temperature 36.4 °C (97.6 °F)          Respiration       16   Weight 195.7         BMI 38.22 kg/m2         Pulse Oximetry 96 % 95 % 93 % 96 % 95 % 95 % 96 %     KPS: 80, Normal activity with effort; some signs or symptoms of disease (ECOG equivalent 1)  Encounter Vitals  Temperature: 36.4 °C (97.6 °F)  Temp src: Temporal  Blood Pressure : (!) 157/83  Pulse: 99  Pulse Oximetry: 96 %  Weight: 88.8 kg (195 lb 11.2 oz)  Pain Score: No pain      4/9/2025     2:56 PM 3/26/2025    10:06 AM 3/19/2025     9:13 AM 3/12/2025    10:31 AM 3/5/2025     9:16 AM 2/14/2025    10:06 AM   Pain Assessment   Pain Score NO PAIN NO PAIN NO PAIN NO PAIN NO PAIN NO PAIN          PHYSICAL EXAM:  Physical Exam  Vitals and nursing note reviewed.   Constitutional:       Appearance: She is well-developed.   HENT:      Head: Normocephalic.   Skin:     General: Skin is warm and dry.      Findings: No erythema.   Neurological:      Mental Status: She is alert and oriented to person, place, and time.   Psychiatric:         Behavior: Behavior normal.         Thought Content: Thought content normal.         Judgment: Judgment normal.              4/9/2025     2:58 PM 3/26/2025    10:11 AM 3/19/2025     9:17 AM 3/12/2025    10:33 AM 3/5/2025     9:18 AM   Toxicity Assessment   Toxicity Assessment Female Pelvis Female Pelvis Female Pelvis Female Pelvis Female Pelvis   Fatigue (lethargy, malaise, asthenia) Severe (e.g., decrease in performance status by 2 or more ECOG levels or 40% Karnofsky) or loss of ability to perform some activities Moderate (e.g., decrease in performance status by 1 ECOG level or 20% Karnofsky) or causing difficulty performing some activities Moderate (e.g., decrease in performance status by 1 ECOG level or 20% Karnofsky) or causing difficulty performing some activities Increased fatigue over baseline, but not altering normal activities None   Radiation Dermatitis None None None None None   Anorexia Loss of appetite Oral intake significantly decreased Loss of  appetite Oral intake significantly decreased Oral intake significantly decreased   Colitis None None None Abdominal pain with mucus and/or blood in stool None   Constipation None None None None None   Dehydration Dry mucous membranes and/or diminished skin turgor None None Dry mucous membranes and/or diminished skin turgor None   Diarrhea w/o Colostomy None Increase of <4 stools/day over pre-treatment None Increase of <4 stools/day over pre-treatment None   Flatulence None None Mild None None   Nausea Oral intake significantly decreased Oral intake significantly decreased Able to eat Oral intake significantly decreased Oral intake significantly decreased   Proctitis None None None Increased stool frequency, occasional blood-streaked stools or rectal discomfort (including hemorrhoids) not requiring medication None   Vomiting None None None None None   RT - Pain due to RT None None None None None   Tumor Pain (onset or exacerbation of tumor pain due to treatment) None None None None None   Dysuria (painful urination) None None None None None   Urinary Frequency Normal Increase in frequency up to 2x normal Normal Normal Normal   Urinary Urgency None Present None None None   Bladder Spasms Absent Absent Absent Absent Absent   Incontinence None None None None None   Urinary Retention Normal Normal Normal Normal Normal   Vaginitis (not due to infection) None None None None None   Vaginal Bleeding Spotting, requiring <2 pads per day None None None None   Vaginal Dryness Normal Normal Normal Normal Normal       CURRENT MEDICATIONS:    Current Outpatient Medications:     prochlorperazine (COMPAZINE) 10 MG Tab, Take 10 mg by mouth every 6 hours as needed for Nausea/Vomiting. Pt can only take at night per side effects, Disp: , Rfl:     loperamide (IMODIUM) 2 MG Cap, Take 2 mg by mouth 4 times a day as needed for Diarrhea., Disp: , Rfl:     MAGNESIUM PO, Take 1 Tablet by mouth every day., Disp: , Rfl:     Cholecalciferol  (VITAMIN D-3 PO), Take 1 Tablet by mouth every day., Disp: , Rfl:     ondansetron (ZOFRAN ODT) 4 MG TABLET DISPERSIBLE, Take 4 mg by mouth every 6 hours as needed for Nausea/Vomiting., Disp: , Rfl:     LABORATORY DATA:   Lab Results   Component Value Date/Time    SODIUM 140 04/02/2025 04:10 AM    POTASSIUM 3.7 04/02/2025 04:10 AM    CHLORIDE 104 04/02/2025 04:10 AM    CO2 25 04/02/2025 04:10 AM    GLUCOSE 127 (H) 04/02/2025 04:10 AM    BUN 15 04/02/2025 04:10 AM    CREATININE 1.10 04/02/2025 04:10 AM       Lab Results   Component Value Date/Time    WBC 1.1 (LL) 04/08/2025 11:00 AM    RBC 3.00 (L) 04/08/2025 11:00 AM    HEMOGLOBIN 8.2 (L) 04/08/2025 11:00 AM    HEMATOCRIT 24.6 (L) 04/08/2025 11:00 AM    MCV 82.0 04/08/2025 11:00 AM    MCH 27.3 04/08/2025 11:00 AM    MCHC 33.3 04/08/2025 11:00 AM    PLATELETCT 52 (L) 04/08/2025 11:00 AM         RADIOLOGY DATA:  DX-PELVIS-1 OR 2 VIEWS  Result Date: 4/8/2025  Digitized intraoperative radiograph is submitted for review. This examination is not for diagnostic purpose but for guidance during a surgical procedure. Please see the patient's chart for full procedural details. INTERPRETING LOCATION: [address] [place] [address], [id number]    MR-PELVIS-WITH & W/O AND SEQUENCES  Result Date: 4/1/2025  1.  Stable appearance of the cervix with hypoattenuation anteriorly, unchanged from prior, likely posttreatment change. 2.  Stable presumed LEFT iliac lymph node showing only capsular enhancement, likely treated disease. 3.  No new adenopathy.    MR-PELVIS-WITH & W/O AND SEQUENCES  Result Date: 2/17/2025  There is a 1.8 x 1.4 cm hypoenhancing mass in the anterior lower uterine segment, likely residual mass. Mild irregularity of the anterior uterine wall but no involvement of the vagina. There is a 1.7 x 2.1 cm lobulated nonenhancing mass in the left internal iliac region. This demonstrates no uptake on recent PET CT, likely treated disease    DX-PORTABLE FLUORO > 1 HOUR  Result Date:  4/8/2025  Portable fluoroscopy utilized for 3 seconds. 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 on 2/14/2025      PLAN:  No change in treatment plan    Disposition:  Treatment plan and imaging reviewed. Questions answered. Continue therapy outlined.     Ilda JEAN BAPTISTE M.D.    Orders Placed This Encounter    CBC WITH DIFFERENTIAL

## 2025-04-09 NOTE — DOCUMENTATION QUERY
Formerly [facility], [facility]–1983                                                                       Query Response Note      PATIENT:               MARIANA VICTOR  ACCT #:                  [id number]  MRN:                     [id number]  :                      1955  ADMIT DATE:       3/31/2025 12:45 PM  DISCH DATE:        2025 12:57 PM  RESPONDING  PROVIDER #:        [id number]           QUERY TEXT:    Pancytopenia is documented in the record. Please specify the type:    The patient's Clinical Indicators include:  H&P:  presents with pancytopenia including neutropenia, thrombocytopenia and anemia  Anemia: secondary to chemotherapy   3/31 WBC 0.8, RBC 2.70, Hgb 7.5, Platelet Count 10   WBC 1.3, RBC 2.89, Hgb 8.0, Platelet Count 24  Risk Factors: grade 3 deanna of cervix currently undergoing chemo  Treatment: transfusion PRBC's, platelets, monitor cbc     Important Note:  if new diagnosis or change to documentation made via query please include in daily documentation and/or DC Summary    Thank you,  Seb Salmeron RN, BSN, CCDS  Clinical   Connect via [employer]  Options provided:   -- Antineoplastic chemotherapy induced pancytopenia   -- Pancytopenia due to other condition   -- Other explanation, please specify   -- Unable to determine      Query created by: Seb Salmeron on 2025 9:06 AM    RESPONSE TEXT:    Antineoplastic chemotherapy induced pancytopenia          Electronically signed by:  CLAUDIO NEGRON MD 2025 9:50 AM              
[facility]

## 2025-04-10 ENCOUNTER — ANESTHESIA (OUTPATIENT)
Dept: SURGERY | Facility: MEDICAL CENTER | Age: 70
End: 2025-04-10
Payer: MEDICARE

## 2025-04-10 ENCOUNTER — ANESTHESIA EVENT (OUTPATIENT)
Dept: SURGERY | Facility: MEDICAL CENTER | Age: 70
End: 2025-04-10
Payer: MEDICARE

## 2025-04-10 ENCOUNTER — HOSPITAL ENCOUNTER (OUTPATIENT)
Facility: MEDICAL CENTER | Age: 70
End: 2025-04-10
Attending: RADIOLOGY | Admitting: RADIOLOGY
Payer: MEDICARE

## 2025-04-10 ENCOUNTER — APPOINTMENT (OUTPATIENT)
Dept: RADIOLOGY | Facility: MEDICAL CENTER | Age: 70
End: 2025-04-10
Attending: RADIOLOGY
Payer: MEDICARE

## 2025-04-10 ENCOUNTER — HOSPITAL ENCOUNTER (OUTPATIENT)
Dept: RADIATION ONCOLOGY | Facility: MEDICAL CENTER | Age: 70
End: 2025-04-10

## 2025-04-10 VITALS
HEART RATE: 85 BPM | WEIGHT: 192.02 LBS | SYSTOLIC BLOOD PRESSURE: 142 MMHG | OXYGEN SATURATION: 92 % | TEMPERATURE: 97.5 F | DIASTOLIC BLOOD PRESSURE: 50 MMHG | RESPIRATION RATE: 17 BRPM | HEIGHT: 61 IN | BODY MASS INDEX: 36.25 KG/M2

## 2025-04-10 LAB
CHEMOTHERAPY INFUSION START DATE: NORMAL
CHEMOTHERAPY RECORDS: 6 GY
CHEMOTHERAPY RECORDS: 600 CGY
CHEMOTHERAPY RECORDS: NORMAL
CHEMOTHERAPY RX CANCER: NORMAL
DATE 1ST CHEMO CANCER: NORMAL
RAD ONC ARIA COURSE LAST TREATMENT DATE: NORMAL
RAD ONC ARIA COURSE TREATMENT ELAPSED DAYS: NORMAL
RAD ONC ARIA REFERENCE POINT DOSAGE GIVEN TO DATE: 12 GY
RAD ONC ARIA REFERENCE POINT DOSAGE GIVEN TO DATE: 6.01 GY
RAD ONC ARIA REFERENCE POINT DOSAGE GIVEN TO DATE: 6.15 GY
RAD ONC ARIA REFERENCE POINT DOSAGE GIVEN TO DATE: 6.22 GY
RAD ONC ARIA REFERENCE POINT ID: NORMAL
RAD ONC ARIA REFERENCE POINT SESSION DOSAGE GIVEN: 6 GY
RAD ONC ARIA REFERENCE POINT SESSION DOSAGE GIVEN: 6.01 GY
RAD ONC ARIA REFERENCE POINT SESSION DOSAGE GIVEN: 6.15 GY
RAD ONC ARIA REFERENCE POINT SESSION DOSAGE GIVEN: 6.22 GY

## 2025-04-10 PROCEDURE — 77290 THER RAD SIMULAJ FIELD CPLX: CPT | Mod: 26 | Performed by: RADIOLOGY

## 2025-04-10 PROCEDURE — 160041 HCHG SURGERY MINUTES - EA ADDL 1 MIN LEVEL 4: Performed by: RADIOLOGY

## 2025-04-10 PROCEDURE — 160035 HCHG PACU - 1ST 60 MINS PHASE I: Performed by: RADIOLOGY

## 2025-04-10 PROCEDURE — 77333 RADIATION TREATMENT AID(S): CPT | Mod: 26 | Performed by: RADIOLOGY

## 2025-04-10 PROCEDURE — 57155 INSERT UTERI TANDEM/OVOIDS: CPT | Performed by: RADIOLOGY

## 2025-04-10 PROCEDURE — 76000 FLUOROSCOPY <1 HR PHYS/QHP: CPT | Mod: 26,XU | Performed by: RADIOLOGY

## 2025-04-10 PROCEDURE — 700117 HCHG RX CONTRAST REV CODE 255: Performed by: RADIOLOGY

## 2025-04-10 PROCEDURE — 77332 RADIATION TREATMENT AID(S): CPT | Performed by: RADIOLOGY

## 2025-04-10 PROCEDURE — 77771 HDR RDNCL NTRSTL/ICAV BRCHTX: CPT | Mod: 26 | Performed by: RADIOLOGY

## 2025-04-10 PROCEDURE — 700111 HCHG RX REV CODE 636 W/ 250 OVERRIDE (IP): Performed by: ANESTHESIOLOGY

## 2025-04-10 PROCEDURE — 160029 HCHG SURGERY MINUTES - 1ST 30 MINS LEVEL 4: Performed by: RADIOLOGY

## 2025-04-10 PROCEDURE — 160047 HCHG PACU  - EA ADDL 30 MINS PHASE II: Performed by: RADIOLOGY

## 2025-04-10 PROCEDURE — 700105 HCHG RX REV CODE 258: Performed by: RADIOLOGY

## 2025-04-10 PROCEDURE — 77295 3-D RADIOTHERAPY PLAN: CPT | Mod: 26 | Performed by: RADIOLOGY

## 2025-04-10 PROCEDURE — 77295 3-D RADIOTHERAPY PLAN: CPT | Performed by: RADIOLOGY

## 2025-04-10 PROCEDURE — 160002 HCHG RECOVERY MINUTES (STAT): Performed by: RADIOLOGY

## 2025-04-10 PROCEDURE — 77290 THER RAD SIMULAJ FIELD CPLX: CPT | Performed by: RADIOLOGY

## 2025-04-10 PROCEDURE — 160009 HCHG ANES TIME/MIN: Performed by: RADIOLOGY

## 2025-04-10 PROCEDURE — C1755 CATHETER, INTRASPINAL: HCPCS | Performed by: RADIOLOGY

## 2025-04-10 PROCEDURE — 72170 X-RAY EXAM OF PELVIS: CPT

## 2025-04-10 PROCEDURE — 160046 HCHG PACU - 1ST 60 MINS PHASE II: Performed by: RADIOLOGY

## 2025-04-10 PROCEDURE — 160015 HCHG STAT PREOP MINUTES: Performed by: RADIOLOGY

## 2025-04-10 PROCEDURE — 160025 RECOVERY II MINUTES (STATS): Performed by: RADIOLOGY

## 2025-04-10 PROCEDURE — 160048 HCHG OR STATISTICAL LEVEL 1-5: Performed by: RADIOLOGY

## 2025-04-10 RX ORDER — BUPIVACAINE HYDROCHLORIDE 7.5 MG/ML
INJECTION, SOLUTION INTRASPINAL
Status: COMPLETED | OUTPATIENT
Start: 2025-04-10 | End: 2025-04-10

## 2025-04-10 RX ORDER — MIDAZOLAM HYDROCHLORIDE 1 MG/ML
INJECTION INTRAMUSCULAR; INTRAVENOUS PRN
Status: DISCONTINUED | OUTPATIENT
Start: 2025-04-10 | End: 2025-04-10 | Stop reason: SURG

## 2025-04-10 RX ORDER — DIPHENHYDRAMINE HYDROCHLORIDE 50 MG/ML
12.5 INJECTION, SOLUTION INTRAMUSCULAR; INTRAVENOUS
Status: DISCONTINUED | OUTPATIENT
Start: 2025-04-10 | End: 2025-04-10 | Stop reason: HOSPADM

## 2025-04-10 RX ORDER — IODIXANOL 270 MG/ML
INJECTION, SOLUTION INTRAVASCULAR
Status: DISCONTINUED
Start: 2025-04-10 | End: 2025-04-10 | Stop reason: HOSPADM

## 2025-04-10 RX ORDER — ONDANSETRON 2 MG/ML
INJECTION INTRAMUSCULAR; INTRAVENOUS PRN
Status: DISCONTINUED | OUTPATIENT
Start: 2025-04-10 | End: 2025-04-10 | Stop reason: SURG

## 2025-04-10 RX ORDER — IODIXANOL 270 MG/ML
INJECTION, SOLUTION INTRAVASCULAR
Status: DISCONTINUED | OUTPATIENT
Start: 2025-04-10 | End: 2025-04-10 | Stop reason: HOSPADM

## 2025-04-10 RX ORDER — ONDANSETRON 2 MG/ML
4 INJECTION INTRAMUSCULAR; INTRAVENOUS
Status: DISCONTINUED | OUTPATIENT
Start: 2025-04-10 | End: 2025-04-10 | Stop reason: HOSPADM

## 2025-04-10 RX ORDER — HALOPERIDOL 5 MG/ML
1 INJECTION INTRAMUSCULAR
Status: DISCONTINUED | OUTPATIENT
Start: 2025-04-10 | End: 2025-04-10 | Stop reason: HOSPADM

## 2025-04-10 RX ORDER — CEFAZOLIN SODIUM 1 G/3ML
INJECTION, POWDER, FOR SOLUTION INTRAMUSCULAR; INTRAVENOUS PRN
Status: DISCONTINUED | OUTPATIENT
Start: 2025-04-10 | End: 2025-04-10 | Stop reason: SURG

## 2025-04-10 RX ORDER — SODIUM CHLORIDE, SODIUM LACTATE, POTASSIUM CHLORIDE, CALCIUM CHLORIDE 600; 310; 30; 20 MG/100ML; MG/100ML; MG/100ML; MG/100ML
INJECTION, SOLUTION INTRAVENOUS CONTINUOUS
Status: ACTIVE | OUTPATIENT
Start: 2025-04-10 | End: 2025-04-10

## 2025-04-10 RX ADMIN — ONDANSETRON 4 MG: 2 INJECTION INTRAMUSCULAR; INTRAVENOUS at 08:01

## 2025-04-10 RX ADMIN — FENTANYL CITRATE 25 MCG: 50 INJECTION, SOLUTION INTRAMUSCULAR; INTRAVENOUS at 07:37

## 2025-04-10 RX ADMIN — MIDAZOLAM HYDROCHLORIDE 2 MG: 1 INJECTION, SOLUTION INTRAMUSCULAR; INTRAVENOUS at 07:30

## 2025-04-10 RX ADMIN — CEFAZOLIN 2 G: 1 INJECTION, POWDER, FOR SOLUTION INTRAMUSCULAR; INTRAVENOUS at 07:45

## 2025-04-10 RX ADMIN — SODIUM CHLORIDE, POTASSIUM CHLORIDE, SODIUM LACTATE AND CALCIUM CHLORIDE: 600; 310; 30; 20 INJECTION, SOLUTION INTRAVENOUS at 07:30

## 2025-04-10 RX ADMIN — PROPOFOL 100 MCG/KG/MIN: 10 INJECTION, EMULSION INTRAVENOUS at 07:45

## 2025-04-10 RX ADMIN — BUPIVACAINE HYDROCHLORIDE IN DEXTROSE 2 ML: 7.5 INJECTION, SOLUTION SUBARACHNOID at 07:37

## 2025-04-10 ASSESSMENT — PAIN DESCRIPTION - PAIN TYPE
TYPE: SURGICAL PAIN

## 2025-04-10 ASSESSMENT — FIBROSIS 4 INDEX: FIB4 SCORE: 7.96

## 2025-04-10 ASSESSMENT — PAIN SCALES - GENERAL: PAIN_LEVEL: 0

## 2025-04-10 NOTE — DISCHARGE INSTRUCTIONS
Monday Evening (4/14): start clear liquid in the afternoon, enema early evening / afternoon  Monday (4/14) Infusion Center 2:00 pm for COD lab draw  Monday (4/14) Radiation Oncology for Treatment 2:30 pm  Monday (4/14) Infusion Center 3:30 pm for Transfusion    If any questions arise, call your provider.  If your provider is not available, please feel free to call the Surgical Center at [phone].    MEDICATIONS: Resume taking daily medication.  Take prescribed pain medication with food.  If no medication is prescribed, you may take non-aspirin pain medication if needed.  PAIN MEDICATION CAN BE VERY CONSTIPATING.  Take a stool softener or laxative such as senokot, pericolace, or milk of magnesia if needed.    Last pain medication given:     What to Expect Post Anesthesia    Rest and take it easy for the first 24 hours.  A responsible adult is recommended to remain with you during that time.  It is normal to feel sleepy.  We encourage you to not do anything that requires balance, judgment or coordination.    FOR 24 HOURS DO NOT:  Drive, operate machinery or run household appliances.  Drink beer or alcoholic beverages.  Make important decisions or sign legal documents.    To avoid nausea, slowly advance diet as tolerated, avoiding spicy or greasy foods for the first day.  Add more substantial food to your diet according to your provider's instructions.  Babies can be fed formula or breast milk as soon as they are hungry.  INCREASE FLUIDS AND FIBER TO AVOID CONSTIPATION.    MILD FLU-LIKE SYMPTOMS ARE NORMAL.  YOU MAY EXPERIENCE GENERALIZED MUSCLE ACHES, THROAT IRRITATION, HEADACHE AND/OR SOME NAUSEA.

## 2025-04-10 NOTE — OR NURSING
0811 - Pt arrived from OR., report received. Connected to monitor, VSS. On room air. Patient alert and oriented x4. Radiology nurse at bedside.     0818 patient to radiology with radiology  RN   Rebecca

## 2025-04-10 NOTE — ANESTHESIA PREPROCEDURE EVALUATION
Case: [id number] Date/Time: 04/10/25 0715    Procedure: HIGH DOSE GYNECOLOGICAL BRACHYTHERAPY-INTERSTITIAL    Pre-op diagnosis: CERVICAL CANCER    Location: [facility] ROOM 28 / SURGERY SAME DAY [facility]    Surgeons: Ilda JEAN BAPTISTE M.D.            Relevant Problems   CARDIAC   (positive) Vascular injury      GI   (positive) Peptic ulcer disease       Physical Exam    Airway   Mallampati: II  TM distance: >3 FB       Cardiovascular   Rhythm: regular  Rate: normal     Dental - normal exam           Pulmonary   Breath sounds clear to auscultation     Abdominal    Neurological            Anesthesia Plan    ASA 2       Plan - spinal               Induction: intravenous      Pertinent diagnostic labs and testing reviewed    Informed Consent:    Anesthetic plan and risks discussed with patient.    Use of blood products discussed with: whom consented to blood products.

## 2025-04-10 NOTE — OR NURSING
0950 - Pt to PACU 1 from OR. Bedside report from RN. Attached to monitoring, VSS, breathing is calm and unlabored. Room air, denies pain or nausea, able to move and feel BLE.      0955 - Pt having popsicle, VSS, room air.     1159 - Pt stable to discharge. Voided in restroom. Instructions given, patient and  verbalize understanding. Port deaccessed and armbands removed. [facility] Pt has all belongings with them.

## 2025-04-10 NOTE — ANESTHESIA TIME REPORT
Anesthesia Start and Stop Event Times       Date Time Event    4/10/2025 0724 Ready for Procedure     0730 Anesthesia Start     0816 Anesthesia Stop          Responsible Staff  04/10/25      Name Role Begin End    Flakita Jamison M.D. Anesth 0730 0816          Overtime Reason:  no overtime (within assigned shift)    Comments:                                                      
To get better and follow your care plan as instructed.

## 2025-04-10 NOTE — ANESTHESIA PROCEDURE NOTES
Spinal Block    Date/Time: 4/10/2025 7:37 AM    Performed by: Flakita Jamison M.D.  Authorized by: Flakita Jamison M.D.    Patient Location:  OR  Start Time:  4/10/2025 7:37 AM  End Time:  4/10/2025 7:45 AM  Reason for Block: primary anesthetic    patient identified, IV checked, site marked, risks and benefits discussed, surgical consent, monitors and equipment checked, pre-op evaluation and timeout performed    Patient Position:  Sitting  Prep: Betadine and ChloraPrep    Monitoring:  Continuous pulse oximetry, heart rate and blood pressure  Approach:  Midline  Location:  L4-5  Injection Technique:  Single-shot  Strength:  1%  Dose:  3ml  Needle Type:  Pencan  Needle Gauge:  27 G  CSF flowing pre/post injection:  Yes  Sensory Level:  T8

## 2025-04-10 NOTE — ANESTHESIA POSTPROCEDURE EVALUATION
Patient: Diane Barrett    Procedure Summary       Date: 04/10/25 Room / Location: [facility] ROOM 28 / SURGERY SAME DAY [facility]    Anesthesia Start: 0730 Anesthesia Stop: 0816    Procedure: HIGH DOSE GYNECOLOGICAL BRACHYTHERAPY-INTERSTITIAL (Cervix) Diagnosis: (CERVICAL CANCER)    Surgeons: Ilda JEAN BAPTISTE M.D. Responsible Provider: Flakita Jamison M.D.    Anesthesia Type: spinal ASA Status: 2            Final Anesthesia Type: spinal  Last vitals  BP   Blood Pressure : (!) 142/50    Temp   36.4 °C (97.5 °F)    Pulse   85   Resp   17    SpO2   92 %      Anesthesia Post Evaluation    Patient location during evaluation: PACU  Patient participation: complete - patient participated  Level of consciousness: awake and alert  Pain score: 0    Airway patency: patent  Anesthetic complications: no  Cardiovascular status: hemodynamically stable  Respiratory status: acceptable  Hydration status: euvolemic    PONV: none        No notable events documented.     Nurse Pain Score: 0 (NPRS)

## 2025-04-10 NOTE — PROCEDURES
Date of Service:  04/10/25    Referring Physician: No ref. 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 on 2/14/2025  Histopathologic type: Small cell carcinoma, NOS  Stage prefix: Initial diagnosis  Para-aortic status: Positive  Para-aortic zackary method of assessment: PET  Histologic grade (G): G3  Histologic grading system: 3 grade system  P16 overexpression: Positive       PRESCRIPTION:     Radiation Therapy Episodes       Active Episodes       Radiation Therapy: IMRT (3/3/2025)                   Radiation Treatments         Plan Last Treated On Elapsed Days Fractions Treated Prescribed Fraction Dose (cGy) Prescribed Total Dose (cGy)    HDR2 4/10/2025 38 @ 04/10/2025 1 of 1 600 600    HDR_1 4/8/2025 36 @ 04/08/2025 1 of 1 600 600    Pelvis 4/9/2025 37 @ 04/09/2025 19 of 25 240 6,000                  Reference Point Last Treated On Elapsed Days Most Recent Session Dose (cGy) Total Dose (cGy)    HDR 4/10/2025 38 @ 04/10/2025 600 1,200    HDR Pt1 4/10/2025 38 @ 04/10/2025 601 601    HDR Pt2 4/10/2025 38 @ 04/10/2025 622 622    HDR Pt3 4/10/2025 38 @ 04/10/2025 615 615    Pelvis 4/9/2025 37 @ 04/09/2025 240 4,560    fx1 pt1 4/8/2025 36 @ 04/08/2025 602 602    fx1 pt2 4/8/2025 36 @ 04/08/2025 592 592    fx1 pt3 4/8/2025 36 @ 04/08/2025 606 606    fx1 pt4 4/8/2025 36 @ 04/08/2025 603 603                            TX NUMBER: 2    MARKER: [] Titanium   [x] Gold    SIMULATION GYN HDR BRACHYTHERAPY:    Patient had CT in department after placement of Gyn brachytherapy device in the OR. Please see separate OP note. Position verified and images transferred to walkbyvision.    Plan generated and approved.    In Brachy suite HDR device was attached to the HDR unit via transfer catheters. The setup was verified by myself and physicist and  treatment delivered.    I have personally reviewed the relevant data, performed the target localization, and determined all relevant factors for this patient’s simulation.

## 2025-04-10 NOTE — PROGRESS NOTES
0815- Pt report from PACU RN.  Pt did not recieve 1 unit platelets this am 2nd to labs being adequate.  Pt A&O x 4, keenly awake.  b/p 117/57, HR 81, RR 14, O2 sat 91% RA.  Pt denies pain, has slight sensation to B thighs, slight BLE gross motor movement noted. Pt advised not to move BLE until after device removal.  No bleeding at device site noted.  Weir in place and clamped.  R chest port accessed, LR infusing at tko.    0822- Pt transported to Sim  0830- In sim.  121/72, 84, 14, 92% RA.  Pt continues to deny pain, no changes to BLE sensation or movement.    0825- In sim.  119/61, 78, 14, 96% 1L.    7990-3305- To rm 7.  139/75, 82, 16, 92% RA.  Remains A&O x 4.  Pt denies pain, has slight sensation to B thighs, slight BLE gross motor movement noted. Pt advised not to move BLE until after device removal.  No bleeding at device site noted. Sips of water provided.  SCD's in place, call light in reach.  Pt denies needs at this time.  1250-3675- 148/75, 78, 16, 98% RA.  Remains A&O x 4.  Continues to deny any pain,  has increased slight sensation to B thighs & slight to B feet, slight BLE gross motor movement noted, none to feet. Pt advised not to move BLE until after device removal.  No bleeding at device site noted. SCD's in place.  Pt to tx vault, setting up for tx.    0915- 156/77, 82, 14, 93% RA.  In tx vault.   0916- tx start.  0928- tx end.  Monitoring ongoing throughout.  0930- 1569/78, 83, 18, 91% RA.  Remains A&O x 4.  Continues to deny any pain.  Pt states has near baseline sensation to R thigh, and mod sensation to L thigh.  Pt has gross motor movement to BLE, slight fine motor movement R foot.  0940- Device removed by Dr Diaz, weir d/c'd by RN per Dr Diaz's verbal order.  No bleeding at device site noted.    0950- Pt to PACU.  Report to PACU RN.  d/c instructions, pre-op instructions and schedule for next procedures reviewed/provided.  Schedule for monday 4/14 labs, treatment, then transfusion  provided.  Pt clarke of all teaching.

## 2025-04-10 NOTE — OP REPORT
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 on 2/14/2025  Histopathologic type: Small cell carcinoma, NOS  Stage prefix: Initial diagnosis  Para-aortic status: Positive  Para-aortic zackary method of assessment: PET  Histologic grade (G): G3  Histologic grading system: 3 grade system  P16 overexpression: Positive      POSTOPERATIVE DIAGNOSIS: Same    PRIMARY SURGEON: Ilda Diaz MD     PROCEDURE: # 2, 15 degree tandem and small ovoids insertion for brachytherapy.     ANESTHESIOLOGIST: Flakita Jamison    TYPE OF ANESTHESIA: Spinal     ESTIMATED BLOOD LOSS: 0 cc    DESCRIPTION OF OPERATIVE NOTE: Patient was prepped and draped in a sterile fashion and a Kevin catheter was inserted.  8 cm smitt sleeve previously inserted found.  Next,a 15-degree tandem and small ovoids were inserted with careful visualization of the anatomy. Once appropriate positioning was verified using fluoroscopy, then vaginal packing was utilized to both hold the device in place  and maximize displacement of the  surrounding normal structures. Patient transferred to [facility]. Stabilizer board used to hold device in place.     DISPOSITION: Patient was released to recovery in good condition and will be sent to Radiation Oncology for CT planning and radiation delivery.       ___________________________________   Ilda Diaz MD

## 2025-04-10 NOTE — OR NURSING
Pt has port to Right chest.  Accessed previously  on 4-8-25.  Saline flushed this morning.  Good blood flow and easy to flush.

## 2025-04-10 NOTE — ADDENDUM NOTE
Encounter addended by: Rebecca Johnson R.N. on: 4/10/2025 11:13 AM   Actions taken: Clinical Note Signed

## 2025-04-11 ENCOUNTER — HOSPITAL ENCOUNTER (OUTPATIENT)
Dept: RADIATION ONCOLOGY | Facility: MEDICAL CENTER | Age: 70
End: 2025-04-11
Payer: MEDICARE

## 2025-04-11 LAB
CHEMOTHERAPY INFUSION START DATE: NORMAL
CHEMOTHERAPY RECORDS: 2.4 GY
CHEMOTHERAPY RECORDS: 6000 CGY
CHEMOTHERAPY RECORDS: NORMAL
CHEMOTHERAPY RX CANCER: NORMAL
DATE 1ST CHEMO CANCER: NORMAL
RAD ONC ARIA COURSE LAST TREATMENT DATE: NORMAL
RAD ONC ARIA COURSE TREATMENT ELAPSED DAYS: NORMAL
RAD ONC ARIA REFERENCE POINT DOSAGE GIVEN TO DATE: 48 GY
RAD ONC ARIA REFERENCE POINT ID: NORMAL
RAD ONC ARIA REFERENCE POINT SESSION DOSAGE GIVEN: 2.4 GY

## 2025-04-11 PROCEDURE — 77014 PR CT GUIDANCE PLACEMENT RAD THERAPY FIELDS: CPT | Mod: 26 | Performed by: RADIOLOGY

## 2025-04-11 PROCEDURE — 77427 RADIATION TX MANAGEMENT X5: CPT | Performed by: RADIOLOGY

## 2025-04-11 PROCEDURE — 77386 HCHG IMRT DELIVERY COMPLEX: CPT | Performed by: RADIOLOGY

## 2025-04-14 ENCOUNTER — OUTPATIENT INFUSION SERVICES (OUTPATIENT)
Dept: ONCOLOGY | Facility: MEDICAL CENTER | Age: 70
End: 2025-04-14
Attending: RADIOLOGY
Payer: MEDICARE

## 2025-04-14 ENCOUNTER — HOSPITAL ENCOUNTER (OUTPATIENT)
Dept: RADIATION ONCOLOGY | Facility: MEDICAL CENTER | Age: 70
End: 2025-04-14

## 2025-04-14 VITALS
DIASTOLIC BLOOD PRESSURE: 68 MMHG | RESPIRATION RATE: 16 BRPM | SYSTOLIC BLOOD PRESSURE: 123 MMHG | HEART RATE: 85 BPM | TEMPERATURE: 97.7 F | OXYGEN SATURATION: 97 %

## 2025-04-14 VITALS
TEMPERATURE: 97.6 F | WEIGHT: 195.33 LBS | RESPIRATION RATE: 18 BRPM | BODY MASS INDEX: 36.88 KG/M2 | OXYGEN SATURATION: 96 % | DIASTOLIC BLOOD PRESSURE: 64 MMHG | SYSTOLIC BLOOD PRESSURE: 119 MMHG | HEIGHT: 61 IN | HEART RATE: 108 BPM

## 2025-04-14 DIAGNOSIS — C53.8 MALIGNANT NEOPLASM OVERLAPPING CERVIX UTERI SITE (HCC): ICD-10-CM

## 2025-04-14 DIAGNOSIS — T45.1X5A ANEMIA ASSOCIATED WITH CHEMOTHERAPY: ICD-10-CM

## 2025-04-14 DIAGNOSIS — D64.81 ANEMIA ASSOCIATED WITH CHEMOTHERAPY: ICD-10-CM

## 2025-04-14 LAB
ABO GROUP BLD: NORMAL
BARCODED ABORH UBTYP: 5100
BARCODED ABORH UBTYP: 6200
BARCODED PRD CODE UBPRD: NORMAL
BARCODED PRD CODE UBPRD: NORMAL
BARCODED UNIT NUM UBUNT: NORMAL
BARCODED UNIT NUM UBUNT: NORMAL
BASOPHILS # BLD AUTO: 0 % (ref 0–1.8)
BASOPHILS # BLD: 0 K/UL (ref 0–0.12)
BLD GP AB SCN SERPL QL: NORMAL
CHEMOTHERAPY INFUSION START DATE: NORMAL
CHEMOTHERAPY RECORDS: 2.4 GY
CHEMOTHERAPY RECORDS: 6000 CGY
CHEMOTHERAPY RECORDS: NORMAL
CHEMOTHERAPY RX CANCER: NORMAL
COMPONENT P 8504P: NORMAL
COMPONENT R 8504R: NORMAL
DATE 1ST CHEMO CANCER: NORMAL
EOSINOPHIL # BLD AUTO: 0.02 K/UL (ref 0–0.51)
EOSINOPHIL NFR BLD: 1.3 % (ref 0–6.9)
ERYTHROCYTE [DISTWIDTH] IN BLOOD BY AUTOMATED COUNT: 44.9 FL (ref 35.9–50)
HCT VFR BLD AUTO: 21.7 % (ref 37–47)
HGB BLD-MCNC: 7.3 G/DL (ref 12–16)
IMM GRANULOCYTES # BLD AUTO: 0.02 K/UL (ref 0–0.11)
IMM GRANULOCYTES NFR BLD AUTO: 1.3 % (ref 0–0.9)
LYMPHOCYTES # BLD AUTO: 0.44 K/UL (ref 1–4.8)
LYMPHOCYTES NFR BLD: 27.8 % (ref 22–41)
MCH RBC QN AUTO: 27.8 PG (ref 27–33)
MCHC RBC AUTO-ENTMCNC: 33.6 G/DL (ref 32.2–35.5)
MCV RBC AUTO: 82.5 FL (ref 81.4–97.8)
MONOCYTES # BLD AUTO: 0.35 K/UL (ref 0–0.85)
MONOCYTES NFR BLD AUTO: 22.2 % (ref 0–13.4)
NEUTROPHILS # BLD AUTO: 0.75 K/UL (ref 1.82–7.42)
NEUTROPHILS NFR BLD: 47.4 % (ref 44–72)
NRBC # BLD AUTO: 0 K/UL
NRBC BLD-RTO: 0 /100 WBC (ref 0–0.2)
OUTPT INFUS CBC COMMENT OICOM: ABNORMAL
PLATELET # BLD AUTO: 41 K/UL (ref 164–446)
PLATELETS.RETICULATED NFR BLD AUTO: 5.9 % (ref 0.6–13.1)
PMV BLD AUTO: 10.6 FL (ref 9–12.9)
PRODUCT TYPE UPROD: NORMAL
PRODUCT TYPE UPROD: NORMAL
RAD ONC ARIA COURSE LAST TREATMENT DATE: NORMAL
RAD ONC ARIA COURSE TREATMENT ELAPSED DAYS: NORMAL
RAD ONC ARIA REFERENCE POINT DOSAGE GIVEN TO DATE: 50.4 GY
RAD ONC ARIA REFERENCE POINT ID: NORMAL
RAD ONC ARIA REFERENCE POINT SESSION DOSAGE GIVEN: 2.4 GY
RBC # BLD AUTO: 2.63 M/UL (ref 4.2–5.4)
RH BLD: NORMAL
UNIT STATUS USTAT: NORMAL
UNIT STATUS USTAT: NORMAL
WBC # BLD AUTO: 1.6 K/UL (ref 4.8–10.8)

## 2025-04-14 PROCEDURE — 700102 HCHG RX REV CODE 250 W/ 637 OVERRIDE(OP): Performed by: RADIOLOGY

## 2025-04-14 PROCEDURE — 77386 HCHG IMRT DELIVERY COMPLEX: CPT | Performed by: RADIOLOGY

## 2025-04-14 PROCEDURE — P9016 RBC LEUKOCYTES REDUCED: HCPCS

## 2025-04-14 PROCEDURE — 86900 BLOOD TYPING SEROLOGIC ABO: CPT

## 2025-04-14 PROCEDURE — 36430 TRANSFUSION BLD/BLD COMPNT: CPT

## 2025-04-14 PROCEDURE — 306780 HCHG STAT FOR TRANSFUSION PER CASE

## 2025-04-14 PROCEDURE — 86923 COMPATIBILITY TEST ELECTRIC: CPT

## 2025-04-14 PROCEDURE — 85025 COMPLETE CBC W/AUTO DIFF WBC: CPT

## 2025-04-14 PROCEDURE — 86901 BLOOD TYPING SEROLOGIC RH(D): CPT

## 2025-04-14 PROCEDURE — A9270 NON-COVERED ITEM OR SERVICE: HCPCS | Performed by: RADIOLOGY

## 2025-04-14 PROCEDURE — 86850 RBC ANTIBODY SCREEN: CPT

## 2025-04-14 PROCEDURE — 85055 RETICULATED PLATELET ASSAY: CPT

## 2025-04-14 PROCEDURE — A4212 NON CORING NEEDLE OR STYLET: HCPCS

## 2025-04-14 PROCEDURE — 36591 DRAW BLOOD OFF VENOUS DEVICE: CPT

## 2025-04-14 PROCEDURE — 77014 PR CT GUIDANCE PLACEMENT RAD THERAPY FIELDS: CPT | Mod: 26 | Performed by: RADIOLOGY

## 2025-04-14 PROCEDURE — P9034 PLATELETS, PHERESIS: HCPCS

## 2025-04-14 RX ORDER — DIPHENHYDRAMINE HCL 25 MG
25 TABLET ORAL ONCE
Status: CANCELLED | OUTPATIENT
Start: 2025-04-14 | End: 2025-04-14

## 2025-04-14 RX ORDER — DIPHENHYDRAMINE HYDROCHLORIDE 50 MG/ML
25 INJECTION, SOLUTION INTRAMUSCULAR; INTRAVENOUS PRN
Status: DISCONTINUED | OUTPATIENT
Start: 2025-04-14 | End: 2025-04-14 | Stop reason: HOSPADM

## 2025-04-14 RX ORDER — ACETAMINOPHEN 325 MG/1
650 TABLET ORAL ONCE
Status: CANCELLED | OUTPATIENT
Start: 2025-04-14

## 2025-04-14 RX ORDER — DIPHENHYDRAMINE HCL 25 MG
25 TABLET ORAL ONCE
Status: COMPLETED | OUTPATIENT
Start: 2025-04-14 | End: 2025-04-14

## 2025-04-14 RX ORDER — DIPHENHYDRAMINE HYDROCHLORIDE 50 MG/ML
25 INJECTION, SOLUTION INTRAMUSCULAR; INTRAVENOUS PRN
Status: CANCELLED | OUTPATIENT
Start: 2025-04-14

## 2025-04-14 RX ORDER — ACETAMINOPHEN 325 MG/1
650 TABLET ORAL PRN
Status: CANCELLED | OUTPATIENT
Start: 2025-04-14

## 2025-04-14 RX ORDER — HYDROCORTISONE SODIUM SUCCINATE 100 MG/2ML
100 INJECTION INTRAMUSCULAR; INTRAVENOUS PRN
Status: CANCELLED | OUTPATIENT
Start: 2025-04-14

## 2025-04-14 RX ORDER — ACETAMINOPHEN 325 MG/1
650 TABLET ORAL ONCE
Status: COMPLETED | OUTPATIENT
Start: 2025-04-14 | End: 2025-04-14

## 2025-04-14 RX ORDER — ACETAMINOPHEN 325 MG/1
650 TABLET ORAL PRN
Status: DISCONTINUED | OUTPATIENT
Start: 2025-04-14 | End: 2025-04-14 | Stop reason: HOSPADM

## 2025-04-14 RX ORDER — HYDROCORTISONE SODIUM SUCCINATE 100 MG/2ML
100 INJECTION INTRAMUSCULAR; INTRAVENOUS PRN
Status: DISCONTINUED | OUTPATIENT
Start: 2025-04-14 | End: 2025-04-14 | Stop reason: HOSPADM

## 2025-04-14 RX ADMIN — ACETAMINOPHEN 650 MG: 325 TABLET, FILM COATED ORAL at 15:10

## 2025-04-14 RX ADMIN — DIPHENHYDRAMINE HYDROCHLORIDE 25 MG: 25 TABLET ORAL at 15:10

## 2025-04-14 ASSESSMENT — FIBROSIS 4 INDEX: FIB4 SCORE: 7.96

## 2025-04-14 NOTE — PROGRESS NOTES
Diane into IS for labs.  Right chest port accessed with sterile technique, LP needle used as pt having treatment tomorrow as well.  Brisk blood return noted, labs drawn as ordered. Flushed per policy.  Diane off unit in [facility]. Verified next appointment.

## 2025-04-14 NOTE — PROGRESS NOTES
Pt presented to IS for cbc/possible blood products. Pt denied fever, signs or symptoms of bleeding or acute illness today. POC discussed and pt verbalized understanding.     Port accessed upon arrival, flushed with NS, + blood return. Lab results reviewed. Hgb 7.3 and platelet 41. Per provider treatment plan, pt meets parameters for blood/platelet transfusion today. Premedications and blood/platelets administered per [facility] policy; VSS. No signs or symptoms of adverse reaction or complications noted. Port flushed per policy and de-accessed with needle intact.. Gauze and paper tape applied at this time. Pt educated on neutropenic precautions--expressed understanding and agreed.      Follow-up care discussed and future appts confirmed with pt. Pt will return on 4/16 for another unit of PRBC per Dr Diaz. Pt verbalized understanding when to contact provider for concerns or worsening symptoms. Pt discharged home in no apparent distress at this time.

## 2025-04-14 NOTE — PROGRESS NOTES
Jimbo from Lab called with critical result of WBC 1.6 at 1455. Critical lab result read back to Jimbo.   This critical lab result is within parameters established by Dr. Diaz for this patient. Spoke with Dr. Diaz via telephone and he has reviewed pt labs. Pt to receive PRBCs and platelets today and a second unit of PRBCs on Wednesday.

## 2025-04-15 ENCOUNTER — ANESTHESIA EVENT (OUTPATIENT)
Dept: SURGERY | Facility: MEDICAL CENTER | Age: 70
End: 2025-04-15
Payer: MEDICARE

## 2025-04-15 ENCOUNTER — APPOINTMENT (OUTPATIENT)
Dept: RADIOLOGY | Facility: MEDICAL CENTER | Age: 70
End: 2025-04-15
Attending: RADIOLOGY
Payer: MEDICARE

## 2025-04-15 ENCOUNTER — HOSPITAL ENCOUNTER (OUTPATIENT)
Facility: MEDICAL CENTER | Age: 70
End: 2025-04-15
Attending: RADIOLOGY | Admitting: RADIOLOGY
Payer: MEDICARE

## 2025-04-15 ENCOUNTER — ANESTHESIA (OUTPATIENT)
Dept: SURGERY | Facility: MEDICAL CENTER | Age: 70
End: 2025-04-15
Payer: MEDICARE

## 2025-04-15 ENCOUNTER — HOSPITAL ENCOUNTER (OUTPATIENT)
Dept: RADIATION ONCOLOGY | Facility: MEDICAL CENTER | Age: 70
End: 2025-04-15

## 2025-04-15 VITALS
DIASTOLIC BLOOD PRESSURE: 76 MMHG | SYSTOLIC BLOOD PRESSURE: 162 MMHG | BODY MASS INDEX: 36.42 KG/M2 | HEIGHT: 61 IN | OXYGEN SATURATION: 98 % | TEMPERATURE: 98 F | WEIGHT: 192.9 LBS | RESPIRATION RATE: 16 BRPM | HEART RATE: 108 BPM

## 2025-04-15 LAB
CHEMOTHERAPY INFUSION START DATE: NORMAL
CHEMOTHERAPY RECORDS: 6 GY
CHEMOTHERAPY RECORDS: 600 CGY
CHEMOTHERAPY RECORDS: NORMAL
CHEMOTHERAPY RX CANCER: NORMAL
DATE 1ST CHEMO CANCER: NORMAL
RAD ONC ARIA COURSE LAST TREATMENT DATE: NORMAL
RAD ONC ARIA COURSE TREATMENT ELAPSED DAYS: NORMAL
RAD ONC ARIA REFERENCE POINT DOSAGE GIVEN TO DATE: 18 GY
RAD ONC ARIA REFERENCE POINT DOSAGE GIVEN TO DATE: 5.89 GY
RAD ONC ARIA REFERENCE POINT DOSAGE GIVEN TO DATE: 5.97 GY
RAD ONC ARIA REFERENCE POINT DOSAGE GIVEN TO DATE: 6.12 GY
RAD ONC ARIA REFERENCE POINT ID: NORMAL
RAD ONC ARIA REFERENCE POINT SESSION DOSAGE GIVEN: 5.89 GY
RAD ONC ARIA REFERENCE POINT SESSION DOSAGE GIVEN: 5.97 GY
RAD ONC ARIA REFERENCE POINT SESSION DOSAGE GIVEN: 6 GY
RAD ONC ARIA REFERENCE POINT SESSION DOSAGE GIVEN: 6.12 GY

## 2025-04-15 PROCEDURE — 700111 HCHG RX REV CODE 636 W/ 250 OVERRIDE (IP): Performed by: ANESTHESIOLOGY

## 2025-04-15 PROCEDURE — 72170 X-RAY EXAM OF PELVIS: CPT

## 2025-04-15 PROCEDURE — 77332 RADIATION TREATMENT AID(S): CPT | Performed by: RADIOLOGY

## 2025-04-15 PROCEDURE — 77771 HDR RDNCL NTRSTL/ICAV BRCHTX: CPT | Mod: 26 | Performed by: RADIOLOGY

## 2025-04-15 PROCEDURE — 700105 HCHG RX REV CODE 258: Performed by: ANESTHESIOLOGY

## 2025-04-15 PROCEDURE — 160015 HCHG STAT PREOP MINUTES: Performed by: RADIOLOGY

## 2025-04-15 PROCEDURE — 77333 RADIATION TREATMENT AID(S): CPT | Mod: 26 | Performed by: RADIOLOGY

## 2025-04-15 PROCEDURE — C1717 BRACHYTX, NON-STR,HDR IR-192: HCPCS | Performed by: RADIOLOGY

## 2025-04-15 PROCEDURE — 160046 HCHG PACU - 1ST 60 MINS PHASE II: Performed by: RADIOLOGY

## 2025-04-15 PROCEDURE — 700117 HCHG RX CONTRAST REV CODE 255: Performed by: RADIOLOGY

## 2025-04-15 PROCEDURE — 77290 THER RAD SIMULAJ FIELD CPLX: CPT | Mod: 26 | Performed by: RADIOLOGY

## 2025-04-15 PROCEDURE — 77771 HDR RDNCL NTRSTL/ICAV BRCHTX: CPT | Performed by: RADIOLOGY

## 2025-04-15 PROCEDURE — 160047 HCHG PACU  - EA ADDL 30 MINS PHASE II: Performed by: RADIOLOGY

## 2025-04-15 PROCEDURE — 57155 INSERT UTERI TANDEM/OVOIDS: CPT | Performed by: RADIOLOGY

## 2025-04-15 PROCEDURE — C1755 CATHETER, INTRASPINAL: HCPCS | Performed by: RADIOLOGY

## 2025-04-15 PROCEDURE — 160041 HCHG SURGERY MINUTES - EA ADDL 1 MIN LEVEL 4: Performed by: RADIOLOGY

## 2025-04-15 PROCEDURE — 77295 3-D RADIOTHERAPY PLAN: CPT | Mod: 26 | Performed by: RADIOLOGY

## 2025-04-15 PROCEDURE — 77295 3-D RADIOTHERAPY PLAN: CPT | Performed by: RADIOLOGY

## 2025-04-15 PROCEDURE — 160002 HCHG RECOVERY MINUTES (STAT): Performed by: RADIOLOGY

## 2025-04-15 PROCEDURE — 160025 RECOVERY II MINUTES (STATS): Performed by: RADIOLOGY

## 2025-04-15 PROCEDURE — 77290 THER RAD SIMULAJ FIELD CPLX: CPT | Performed by: RADIOLOGY

## 2025-04-15 PROCEDURE — 160048 HCHG OR STATISTICAL LEVEL 1-5: Performed by: RADIOLOGY

## 2025-04-15 PROCEDURE — 160035 HCHG PACU - 1ST 60 MINS PHASE I: Performed by: RADIOLOGY

## 2025-04-15 PROCEDURE — 160029 HCHG SURGERY MINUTES - 1ST 30 MINS LEVEL 4: Performed by: RADIOLOGY

## 2025-04-15 PROCEDURE — 160009 HCHG ANES TIME/MIN: Performed by: RADIOLOGY

## 2025-04-15 PROCEDURE — 76000 FLUOROSCOPY <1 HR PHYS/QHP: CPT | Mod: 26,XU | Performed by: RADIOLOGY

## 2025-04-15 RX ORDER — IODIXANOL 270 MG/ML
INJECTION, SOLUTION INTRAVASCULAR
Status: DISCONTINUED
Start: 2025-04-15 | End: 2025-04-15 | Stop reason: HOSPADM

## 2025-04-15 RX ORDER — OXYCODONE HCL 5 MG/5 ML
10 SOLUTION, ORAL ORAL
Status: DISCONTINUED | OUTPATIENT
Start: 2025-04-15 | End: 2025-04-15 | Stop reason: HOSPADM

## 2025-04-15 RX ORDER — HYDROMORPHONE HYDROCHLORIDE 1 MG/ML
.25-.5 INJECTION, SOLUTION INTRAMUSCULAR; INTRAVENOUS; SUBCUTANEOUS
Status: DISCONTINUED | OUTPATIENT
Start: 2025-04-15 | End: 2025-04-15 | Stop reason: HOSPADM

## 2025-04-15 RX ORDER — DEXAMETHASONE SODIUM PHOSPHATE 4 MG/ML
INJECTION, SOLUTION INTRA-ARTICULAR; INTRALESIONAL; INTRAMUSCULAR; INTRAVENOUS; SOFT TISSUE PRN
Status: DISCONTINUED | OUTPATIENT
Start: 2025-04-15 | End: 2025-04-15 | Stop reason: SURG

## 2025-04-15 RX ORDER — HYDROMORPHONE HYDROCHLORIDE 1 MG/ML
1 INJECTION, SOLUTION INTRAMUSCULAR; INTRAVENOUS; SUBCUTANEOUS
Status: DISCONTINUED | OUTPATIENT
Start: 2025-04-15 | End: 2025-04-15 | Stop reason: HOSPADM

## 2025-04-15 RX ORDER — MIDAZOLAM HYDROCHLORIDE 1 MG/ML
1 INJECTION INTRAMUSCULAR; INTRAVENOUS
Status: DISCONTINUED | OUTPATIENT
Start: 2025-04-15 | End: 2025-04-15 | Stop reason: HOSPADM

## 2025-04-15 RX ORDER — HYDROMORPHONE HYDROCHLORIDE 1 MG/ML
0.4 INJECTION, SOLUTION INTRAMUSCULAR; INTRAVENOUS; SUBCUTANEOUS
Status: DISCONTINUED | OUTPATIENT
Start: 2025-04-15 | End: 2025-04-15 | Stop reason: HOSPADM

## 2025-04-15 RX ORDER — OXYCODONE HCL 5 MG/5 ML
5 SOLUTION, ORAL ORAL
Status: DISCONTINUED | OUTPATIENT
Start: 2025-04-15 | End: 2025-04-15 | Stop reason: HOSPADM

## 2025-04-15 RX ORDER — DIPHENHYDRAMINE HYDROCHLORIDE 50 MG/ML
12.5 INJECTION, SOLUTION INTRAMUSCULAR; INTRAVENOUS
Status: DISCONTINUED | OUTPATIENT
Start: 2025-04-15 | End: 2025-04-15 | Stop reason: HOSPADM

## 2025-04-15 RX ORDER — BUPIVACAINE HYDROCHLORIDE 5 MG/ML
INJECTION, SOLUTION EPIDURAL; INTRACAUDAL; PERINEURAL PRN
Status: DISCONTINUED | OUTPATIENT
Start: 2025-04-15 | End: 2025-04-15 | Stop reason: SURG

## 2025-04-15 RX ORDER — ONDANSETRON 2 MG/ML
4 INJECTION INTRAMUSCULAR; INTRAVENOUS EVERY 4 HOURS PRN
Status: DISCONTINUED | OUTPATIENT
Start: 2025-04-15 | End: 2025-04-15 | Stop reason: HOSPADM

## 2025-04-15 RX ORDER — HYDROMORPHONE HYDROCHLORIDE 1 MG/ML
0.2 INJECTION, SOLUTION INTRAMUSCULAR; INTRAVENOUS; SUBCUTANEOUS
Status: DISCONTINUED | OUTPATIENT
Start: 2025-04-15 | End: 2025-04-15 | Stop reason: HOSPADM

## 2025-04-15 RX ORDER — HALOPERIDOL 5 MG/ML
1 INJECTION INTRAMUSCULAR
Status: DISCONTINUED | OUTPATIENT
Start: 2025-04-15 | End: 2025-04-15 | Stop reason: HOSPADM

## 2025-04-15 RX ORDER — MIDAZOLAM HYDROCHLORIDE 1 MG/ML
INJECTION INTRAMUSCULAR; INTRAVENOUS PRN
Status: DISCONTINUED | OUTPATIENT
Start: 2025-04-15 | End: 2025-04-15 | Stop reason: SURG

## 2025-04-15 RX ORDER — BACITRACIN ZINC 500 [USP'U]/G
OINTMENT TOPICAL
Status: DISCONTINUED
Start: 2025-04-15 | End: 2025-04-15 | Stop reason: HOSPADM

## 2025-04-15 RX ORDER — IODIXANOL 270 MG/ML
INJECTION, SOLUTION INTRAVASCULAR
Status: DISCONTINUED | OUTPATIENT
Start: 2025-04-15 | End: 2025-04-15 | Stop reason: HOSPADM

## 2025-04-15 RX ORDER — BUPIVACAINE HYDROCHLORIDE 5 MG/ML
INJECTION, SOLUTION EPIDURAL; INTRACAUDAL; PERINEURAL
Status: COMPLETED
Start: 2025-04-15 | End: 2025-04-15

## 2025-04-15 RX ORDER — ONDANSETRON 2 MG/ML
4 INJECTION INTRAMUSCULAR; INTRAVENOUS
Status: DISCONTINUED | OUTPATIENT
Start: 2025-04-15 | End: 2025-04-15 | Stop reason: HOSPADM

## 2025-04-15 RX ORDER — MEPERIDINE HYDROCHLORIDE 25 MG/ML
12.5 INJECTION INTRAMUSCULAR; INTRAVENOUS; SUBCUTANEOUS
Status: DISCONTINUED | OUTPATIENT
Start: 2025-04-15 | End: 2025-04-15 | Stop reason: HOSPADM

## 2025-04-15 RX ORDER — IPRATROPIUM BROMIDE AND ALBUTEROL SULFATE 2.5; .5 MG/3ML; MG/3ML
3 SOLUTION RESPIRATORY (INHALATION)
Status: DISCONTINUED | OUTPATIENT
Start: 2025-04-15 | End: 2025-04-15 | Stop reason: HOSPADM

## 2025-04-15 RX ORDER — SODIUM CHLORIDE, SODIUM LACTATE, POTASSIUM CHLORIDE, CALCIUM CHLORIDE 600; 310; 30; 20 MG/100ML; MG/100ML; MG/100ML; MG/100ML
INJECTION, SOLUTION INTRAVENOUS
Status: DISCONTINUED | OUTPATIENT
Start: 2025-04-15 | End: 2025-04-15 | Stop reason: SURG

## 2025-04-15 RX ORDER — CEFAZOLIN SODIUM 1 G/3ML
INJECTION, POWDER, FOR SOLUTION INTRAMUSCULAR; INTRAVENOUS PRN
Status: DISCONTINUED | OUTPATIENT
Start: 2025-04-15 | End: 2025-04-15 | Stop reason: SURG

## 2025-04-15 RX ADMIN — PROPOFOL 20 MG: 10 INJECTION, EMULSION INTRAVENOUS at 09:49

## 2025-04-15 RX ADMIN — PROPOFOL 20 MG: 10 INJECTION, EMULSION INTRAVENOUS at 10:01

## 2025-04-15 RX ADMIN — SODIUM CHLORIDE, POTASSIUM CHLORIDE, SODIUM LACTATE AND CALCIUM CHLORIDE: 600; 310; 30; 20 INJECTION, SOLUTION INTRAVENOUS at 09:28

## 2025-04-15 RX ADMIN — BUPIVACAINE HYDROCHLORIDE 2 ML: 5 INJECTION, SOLUTION EPIDURAL; INTRACAUDAL at 09:44

## 2025-04-15 RX ADMIN — CEFAZOLIN 2 G: 1 INJECTION, POWDER, FOR SOLUTION INTRAMUSCULAR; INTRAVENOUS at 09:40

## 2025-04-15 RX ADMIN — MIDAZOLAM HYDROCHLORIDE 2 MG: 1 INJECTION, SOLUTION INTRAMUSCULAR; INTRAVENOUS at 09:39

## 2025-04-15 RX ADMIN — FENTANYL CITRATE 25 MCG: 50 INJECTION, SOLUTION INTRAMUSCULAR; INTRAVENOUS at 09:44

## 2025-04-15 RX ADMIN — DEXAMETHASONE SODIUM PHOSPHATE 8 MG: 4 INJECTION INTRA-ARTICULAR; INTRALESIONAL; INTRAMUSCULAR; INTRAVENOUS; SOFT TISSUE at 09:49

## 2025-04-15 RX ADMIN — PROPOFOL 20 MG: 10 INJECTION, EMULSION INTRAVENOUS at 09:53

## 2025-04-15 RX ADMIN — PROPOFOL 20 MG: 10 INJECTION, EMULSION INTRAVENOUS at 09:45

## 2025-04-15 RX ADMIN — PROPOFOL 20 MG: 10 INJECTION, EMULSION INTRAVENOUS at 09:57

## 2025-04-15 ASSESSMENT — PAIN DESCRIPTION - PAIN TYPE
TYPE: SURGICAL PAIN

## 2025-04-15 ASSESSMENT — FIBROSIS 4 INDEX: FIB4 SCORE: 10.1

## 2025-04-15 ASSESSMENT — PAIN SCALES - GENERAL: PAIN_LEVEL: 0

## 2025-04-15 NOTE — OR NURSING
1300 Report received from Kirsten STROUD.     1302: Pt arrived to bay 20, remained on gurney due to numbness on legs, s/p brachytherapy with Dr. Diaz, no surgical sites of note, patient denies pain and nausea at this time    1356 patient sat up edge of bed, tolerates well, ambulated to bathroom, void successful, changed into clothes    1400 Discharge instructions reviewed with patient, telephone updates to spouse, verbalized understanding and all questions answered.    1410: IV and ID bands removed. [facility], port left accessed for infusions tomorrow, all MD aware and in agreement to plan, patient safely dc at this time

## 2025-04-15 NOTE — ANESTHESIA PREPROCEDURE EVALUATION
Case: [id number] Anesthesia Start Date/Time: 04/15/25 0928    Procedure: HIGH DOSE GYNECOLOGICAL BRACHYTHERAPY-INTERSTITIAL    Pre-op diagnosis: CERVICAL CANCER    Location: [facility] ROOM 27 / SURGERY SAME DAY [facility]    Surgeons: Ilda JEAN BAPTISTE M.D.            Relevant Problems   CARDIAC   (positive) Vascular injury      GI   (positive) Peptic ulcer disease       Physical Exam    Airway   Mallampati: III  TM distance: >3 FB  Neck ROM: full       Cardiovascular - normal exam  Rhythm: regular  Rate: normal  (-) murmur     Dental - normal exam           Pulmonary - normal exam  Breath sounds clear to auscultation     Abdominal   (+) obese     Neurological - normal exam                   Anesthesia Plan    ASA 3       Plan - spinal   Neuraxial block will be primary anesthetic            Induction: intravenous    Postoperative Plan: Postoperative administration of opioids is intended.    Pertinent diagnostic labs and testing reviewed    Informed Consent:    Anesthetic plan and risks discussed with patient.    Use of blood products discussed with: patient whom consented to blood products.

## 2025-04-15 NOTE — DISCHARGE INSTRUCTIONS
What to Expect Post Anesthesia    Rest and take it easy for the first 24 hours.  A responsible adult is recommended to remain with you during that time.  It is normal to feel sleepy.  We encourage you to not do anything that requires balance, judgment or coordination.    FOR 24 HOURS DO NOT:  Drive, operate machinery or run household appliances.  Drink beer or alcoholic beverages.  Make important decisions or sign legal documents.    To avoid nausea, slowly advance diet as tolerated, avoiding spicy or greasy foods for the first day.  Add more substantial food to your diet according to your provider's instructions.  Babies can be fed formula or breast milk as soon as they are hungry.  INCREASE FLUIDS AND FIBER TO AVOID CONSTIPATION.    MILD FLU-LIKE SYMPTOMS ARE NORMAL.  YOU MAY EXPERIENCE GENERALIZED MUSCLE ACHES, THROAT IRRITATION, HEADACHE AND/OR SOME NAUSEA.    If any questions arise, call your provider.  If your provider is not available, please feel free to call the Surgical Center at [phone].    MEDICATIONS: Resume taking daily medication.  Take prescribed pain medication with food.  If no medication is prescribed, you may take non-aspirin pain medication if needed.  PAIN MEDICATION CAN BE VERY CONSTIPATING.  Take a stool softener or laxative such as senokot, pericolace, or milk of magnesia if needed.    Last pain medication given at     Tylenol or Ibuprofen (if able to take) as needed for discomfort. Please follow all instructions provided by Dr Diaz. Watch for signs/symptoms of urinary tract infection.

## 2025-04-15 NOTE — PROGRESS NOTES
1000- Pt report from PACU RN.  Pt A&O x 4, keenly awake.  b/p 103/54, HR 89, RR 14, O2 sat 99% 2L.  Pt denies pain, denies BLE sensation, very slight B foot gross motor movement noted. Pt advised not to move BLE until after device removal.  No bleeding at device site noted.  Weir in place and clamped.  R chest port accessed, LR infusing at tko.    1025- Pt transported to rm 7.  SCD's in place.  Call light in reach.  1030- 117/54, 84, 14, 98& 1L.  Pt remains A&O x 4, continues to deny any pain, no changes to BLE sensation or movement.  No other changes notes  1041- In sim.    1045- 126/70, 86, 16, 93% 1L.  Pt continues to deny pain, no changes to BLE sensation or movement.    1100- To rm 7.  139/77, 85, 16, 98% 1L.  Pt remains A&O x 4.  Continues to deny pain.  No bleeding at device site noted.  L foot slight movement noted, R foot very slight.  R thigh slight sensation, denies other sensation BLE.  SCD's in place.  Call light in reach.  1115- 147/72, 77, 14, 96% 1L.  Resting quitely.    1125- To tx vault- 148/75, 78, 16, 98% RA.  Remains A&O x 4.  Continues to deny any pain,  has increased slight sensation to B thighs & slight to B feet, slight RLE gross motor movement noted, none to feet. Pt advised not to move BLE until after device removal.  No bleeding at device site noted. SCD's in place.  Pt to tx vault, setting up for tx.    2181-1716- 158/72, 86, 16, 97% 1L.  Remains A&O x 4.  Continues to deny any pain.  In tx vault.   1145- tx start.  1155- tx end.  Monitoring ongoing throughout.  135/71, 87, 96% 1L.    1155- Device removed by Dr Diaz, weir d/c'd by RN per Dr Diaz's verbal order.  Slight bleeding at device site noted, pressure held.  Pt continues to deny pain, has moderate foot movement, slight RLE gross motor movement. Moderate B foot sensation, slight to R thigh.    1210- Pt to PACU.  Report to PACU RN.  d/c instructions, pre-op instructions and schedule for next procedures reviewed/provided.  Pt  vu of all teaching.

## 2025-04-15 NOTE — OR NURSING
1240 care taken over from Oksana STROUD. Pt has some movement in lower extremities. Tolerating popsicle    1305 PT over to phase 2. Report given to Rosa STROUD.

## 2025-04-15 NOTE — ANESTHESIA POSTPROCEDURE EVALUATION
Patient: Diane Barrett    Procedure Summary       Date: 04/15/25 Room / Location: [facility] ROOM 27 / SURGERY SAME DAY [facility]    Anesthesia Start: 0928 Anesthesia Stop: 1009    Procedure: HIGH DOSE GYNECOLOGICAL BRACHYTHERAPY-INTERSTITIAL (Cervix) Diagnosis: (CERVICAL CANCER)    Surgeons: Ilda JEAN BAPTISTE M.D. Responsible Provider: Moises Mckee III, M.D.    Anesthesia Type: spinal ASA Status: 3            Final Anesthesia Type: spinal  Last vitals  BP   Blood Pressure : (!) 156/70    Temp   36.8 °C (98.2 °F)    Pulse   85   Resp   16    SpO2   97 %      Anesthesia Post Evaluation    Patient location during evaluation: PACU  Patient participation: complete - patient participated  Level of consciousness: awake and alert  Pain score: 0    Airway patency: patent  Anesthetic complications: no  Cardiovascular status: hemodynamically stable  Respiratory status: acceptable  Hydration status: euvolemic    PONV: none          No notable events documented.     Nurse Pain Score: 0 (NPRS)

## 2025-04-15 NOTE — ANESTHESIA PROCEDURE NOTES
Spinal Block    Date/Time: 4/15/2025 9:41 AM    Performed by: Moises Mckee III, M.D.  Authorized by: Moises Mckee III, M.D.    Patient Location:  OR  Start Time:  4/15/2025 9:41 AM  End Time:  4/15/2025 9:44 AM  Reason for Block: primary anesthetic    patient identified, IV checked, site marked, risks and benefits discussed, surgical consent, monitors and equipment checked, pre-op evaluation and timeout performed    Patient Position:  Sitting  Prep: ChloraPrep, patient draped and sterile technique    Monitoring:  Blood pressure, continuous pulse oximetry and heart rate  Approach:  Midline  Location:  L2-3  Injection Technique:  Single-shot  Skin infiltration:  Lidocaine  Strength:  1%  Dose:  2ml  Needle Type:  Pencan  Needle Gauge:  25 G  CSF flowing pre/post injection:  Yes  Sensory Level:  T10

## 2025-04-15 NOTE — OR NURSING
1006 Pt arrived from OR to PACU bay ., report received. Connected to monitor, VSS. On -2L mask. Pt awake and alert, no complaints of pain. Numbness to bilateral lower extremities    1016 pt to radiology with Rebecca STROUD.

## 2025-04-15 NOTE — OP REPORT
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 on 2/14/2025  Histopathologic type: Small cell carcinoma, NOS  Stage prefix: Initial diagnosis  Para-aortic status: Positive  Para-aortic zackary method of assessment: PET  Histologic grade (G): G3  Histologic grading system: 3 grade system  P16 overexpression: Positive      POSTOPERATIVE DIAGNOSIS: Same    PRIMARY SURGEON: Ilda Diaz MD     PROCEDURE: # 3, 15 degree tandem and small ovoids insertion for brachytherapy.     ANESTHESIOLOGIST: Moises Mckee    TYPE OF ANESTHESIA: Spinal     ESTIMATED BLOOD LOSS: 0 cc    DESCRIPTION OF OPERATIVE NOTE: Patient was prepped and draped in a sterile fashion and a Kevin catheter was inserted.  8 cm smitt sleeve previously inserted found.  Next,a 15-degree tandem and small ovoids were inserted with careful visualization of the anatomy. Once appropriate positioning was verified using fluoroscopy, then vaginal packing was utilized to both hold the device in place  and maximize displacement of the  surrounding normal structures. Patient transferred to [facility]. Stabilizer board used to hold device in place.     DISPOSITION: Patient was released to recovery in good condition and will be sent to Radiation Oncology for CT planning and radiation delivery.       ___________________________________   Ilda Diaz MD

## 2025-04-15 NOTE — PROCEDURES
Date of Service:  04/15/25    Referring Physician: No ref. 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 on 2/14/2025  Histopathologic type: Small cell carcinoma, NOS  Stage prefix: Initial diagnosis  Para-aortic status: Positive  Para-aortic zackary method of assessment: PET  Histologic grade (G): G3  Histologic grading system: 3 grade system  P16 overexpression: Positive       PRESCRIPTION:     Radiation Therapy Episodes       Active Episodes       Radiation Therapy: IMRT (3/3/2025)                   Radiation Treatments         Plan Last Treated On Elapsed Days Fractions Treated Prescribed Fraction Dose (cGy) Prescribed Total Dose (cGy)    HDR2 4/10/2025 38 @ 04/10/2025 1 of 1 600 600    HDR3 4/15/2025 43 @ 04/15/2025 1 of 1 600 600    HDR_1 4/8/2025 36 @ 04/08/2025 1 of 1 600 600    Pelvis 4/14/2025 42 @ 04/14/2025 21 of 25 240 6,000                  Reference Point Last Treated On Elapsed Days Most Recent Session Dose (cGy) Total Dose (cGy)    HDR 4/15/2025 43 @ 04/15/2025 600 1,800    HDR Pt1 4/10/2025 38 @ 04/10/2025 601 601    HDR Pt2 4/10/2025 38 @ 04/10/2025 622 622    HDR Pt3 4/10/2025 38 @ 04/10/2025 615 615    Pelvis 4/14/2025 42 @ 04/14/2025 240 5,040    fx1 pt1 4/8/2025 36 @ 04/08/2025 602 602    fx1 pt2 4/8/2025 36 @ 04/08/2025 592 592    fx1 pt3 4/8/2025 36 @ 04/08/2025 606 606    fx1 pt4 4/8/2025 36 @ 04/08/2025 603 603    fx3 pt1 4/15/2025 43 @ 04/15/2025 597 597    fx3 pt2 4/15/2025 43 @ 04/15/2025 589 589    fx3 pt3 4/15/2025 43 @ 04/15/2025 612 612                            TX NUMBER: 3    MARKER: [] Titanium   [x] Gold    SIMULATION GYN HDR BRACHYTHERAPY:    Patient had CT in department after placement of Gyn brachytherapy device in the OR. Please see separate OP note. Position verified and images  transferred to [employer].    Plan generated and approved.    In Brachy suite HDR device was attached to the HDR unit via transfer catheters. The setup was verified by myself and physicist and treatment delivered.    I have personally reviewed the relevant data, performed the target localization, and determined all relevant factors for this patient’s simulation.

## 2025-04-16 ENCOUNTER — HOSPITAL ENCOUNTER (OUTPATIENT)
Dept: RADIATION ONCOLOGY | Facility: MEDICAL CENTER | Age: 70
End: 2025-04-16

## 2025-04-16 ENCOUNTER — OUTPATIENT INFUSION SERVICES (OUTPATIENT)
Dept: ONCOLOGY | Facility: MEDICAL CENTER | Age: 70
End: 2025-04-16
Attending: RADIOLOGY
Payer: MEDICARE

## 2025-04-16 ENCOUNTER — HOSPITAL ENCOUNTER (OUTPATIENT)
Dept: RADIATION ONCOLOGY | Facility: MEDICAL CENTER | Age: 70
End: 2025-04-16
Attending: RADIOLOGY
Payer: MEDICARE

## 2025-04-16 VITALS
SYSTOLIC BLOOD PRESSURE: 145 MMHG | HEART RATE: 92 BPM | BODY MASS INDEX: 37.3 KG/M2 | TEMPERATURE: 97.6 F | OXYGEN SATURATION: 97 % | DIASTOLIC BLOOD PRESSURE: 83 MMHG | WEIGHT: 195 LBS

## 2025-04-16 VITALS
OXYGEN SATURATION: 97 % | DIASTOLIC BLOOD PRESSURE: 66 MMHG | HEIGHT: 61 IN | BODY MASS INDEX: 36.21 KG/M2 | RESPIRATION RATE: 18 BRPM | HEART RATE: 82 BPM | SYSTOLIC BLOOD PRESSURE: 127 MMHG | TEMPERATURE: 98 F | WEIGHT: 191.8 LBS

## 2025-04-16 DIAGNOSIS — C53.8 MALIGNANT NEOPLASM OVERLAPPING CERVIX UTERI SITE (HCC): ICD-10-CM

## 2025-04-16 DIAGNOSIS — T45.1X5A ANEMIA ASSOCIATED WITH CHEMOTHERAPY: ICD-10-CM

## 2025-04-16 DIAGNOSIS — D64.81 ANEMIA ASSOCIATED WITH CHEMOTHERAPY: ICD-10-CM

## 2025-04-16 LAB
BARCODED ABORH UBTYP: 6200
BARCODED PRD CODE UBPRD: NORMAL
BARCODED UNIT NUM UBUNT: NORMAL
CHEMOTHERAPY INFUSION START DATE: NORMAL
CHEMOTHERAPY RECORDS: 2.4 GY
CHEMOTHERAPY RECORDS: 6000 CGY
CHEMOTHERAPY RECORDS: NORMAL
CHEMOTHERAPY RX CANCER: NORMAL
COMPONENT R 8504R: NORMAL
DATE 1ST CHEMO CANCER: NORMAL
PRODUCT TYPE UPROD: NORMAL
RAD ONC ARIA COURSE LAST TREATMENT DATE: NORMAL
RAD ONC ARIA COURSE TREATMENT ELAPSED DAYS: NORMAL
RAD ONC ARIA REFERENCE POINT DOSAGE GIVEN TO DATE: 52.8 GY
RAD ONC ARIA REFERENCE POINT ID: NORMAL
RAD ONC ARIA REFERENCE POINT SESSION DOSAGE GIVEN: 2.4 GY
UNIT STATUS USTAT: NORMAL

## 2025-04-16 PROCEDURE — 77014 PR CT GUIDANCE PLACEMENT RAD THERAPY FIELDS: CPT | Mod: 26 | Performed by: RADIOLOGY

## 2025-04-16 PROCEDURE — 77386 HCHG IMRT DELIVERY COMPLEX: CPT | Performed by: RADIOLOGY

## 2025-04-16 PROCEDURE — 86923 COMPATIBILITY TEST ELECTRIC: CPT

## 2025-04-16 PROCEDURE — 306780 HCHG STAT FOR TRANSFUSION PER CASE

## 2025-04-16 PROCEDURE — 36430 TRANSFUSION BLD/BLD COMPNT: CPT

## 2025-04-16 PROCEDURE — P9016 RBC LEUKOCYTES REDUCED: HCPCS

## 2025-04-16 PROCEDURE — 700102 HCHG RX REV CODE 250 W/ 637 OVERRIDE(OP): Performed by: RADIOLOGY

## 2025-04-16 PROCEDURE — A9270 NON-COVERED ITEM OR SERVICE: HCPCS | Performed by: RADIOLOGY

## 2025-04-16 PROCEDURE — A4212 NON CORING NEEDLE OR STYLET: HCPCS

## 2025-04-16 RX ORDER — ACETAMINOPHEN 325 MG/1
650 TABLET ORAL ONCE
Status: COMPLETED | OUTPATIENT
Start: 2025-04-16 | End: 2025-04-16

## 2025-04-16 RX ORDER — ONDANSETRON 2 MG/ML
4 INJECTION INTRAMUSCULAR; INTRAVENOUS EVERY 4 HOURS PRN
Status: DISCONTINUED | OUTPATIENT
Start: 2025-04-17 | End: 2025-04-17 | Stop reason: HOSPADM

## 2025-04-16 RX ORDER — ACETAMINOPHEN 325 MG/1
650 TABLET ORAL ONCE
OUTPATIENT
Start: 2025-04-16

## 2025-04-16 RX ORDER — HYDROCORTISONE SODIUM SUCCINATE 100 MG/2ML
100 INJECTION INTRAMUSCULAR; INTRAVENOUS PRN
OUTPATIENT
Start: 2025-04-16

## 2025-04-16 RX ORDER — DIPHENHYDRAMINE HYDROCHLORIDE 50 MG/ML
25 INJECTION, SOLUTION INTRAMUSCULAR; INTRAVENOUS PRN
OUTPATIENT
Start: 2025-04-16

## 2025-04-16 RX ORDER — DIPHENHYDRAMINE HCL 25 MG
25 TABLET ORAL ONCE
Status: COMPLETED | OUTPATIENT
Start: 2025-04-16 | End: 2025-04-16

## 2025-04-16 RX ORDER — DIPHENHYDRAMINE HCL 25 MG
25 TABLET ORAL ONCE
OUTPATIENT
Start: 2025-04-16 | End: 2025-04-16

## 2025-04-16 RX ORDER — HYDROMORPHONE HYDROCHLORIDE 1 MG/ML
.25-.5 INJECTION, SOLUTION INTRAMUSCULAR; INTRAVENOUS; SUBCUTANEOUS
Status: DISCONTINUED | OUTPATIENT
Start: 2025-04-17 | End: 2025-04-17 | Stop reason: HOSPADM

## 2025-04-16 RX ORDER — ACETAMINOPHEN 325 MG/1
650 TABLET ORAL PRN
OUTPATIENT
Start: 2025-04-16

## 2025-04-16 RX ADMIN — DIPHENHYDRAMINE HYDROCHLORIDE 25 MG: 25 TABLET ORAL at 09:13

## 2025-04-16 RX ADMIN — ACETAMINOPHEN 650 MG: 325 TABLET ORAL at 09:12

## 2025-04-16 ASSESSMENT — FIBROSIS 4 INDEX
FIB4 SCORE: 10.1
FIB4 SCORE: 10.1

## 2025-04-16 ASSESSMENT — PAIN SCALES - GENERAL: PAINLEVEL_OUTOF10: NO PAIN

## 2025-04-16 ASSESSMENT — PAIN DESCRIPTION - PAIN TYPE: TYPE: SURGICAL PAIN

## 2025-04-16 NOTE — PROGRESS NOTES
Diane arrived ambulatory to [facility] for 1 unit of PRBCs, first unit of PRBCs was received on 4/14. Plan of care reviewed, assessment completed.   Patient arrived to [facility] with her port accessed from radiation appointment yesterday 4/15, dressing C/D/I. Port flushed with NS, positive blood return noted.   Labs reviewed from 4/14. Consent forms verified.   Pre-medications administered per the MAR.   1 unit PRBCs administered, patient tolerated well, vitals stable throughout, see flowsheets. Port flushed, positive blood return noted.   Port flushed per protocol and left accessed for appointment later today, per the patients preference. Patient knows to contact scheduled for future [facility] appointments. Patient discharged home in [facility].

## 2025-04-16 NOTE — ON TREATMENT VISIT
ON TREATMENT  NOTE  RADIATION ONCOLOGY DEPARTMENT    Patient name:  Diane Barrett    Primary Physician:  Brendan Cho M.D. MRN: [id number]  CSN: [id number]   Referring physician:  Ambrose Silverman M.D.   : 1955, 69 y.o.     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 on 2025  Histopathologic type: Small cell carcinoma, NOS  Stage prefix: Initial diagnosis  Para-aortic status: Positive  Para-aortic zackary method of assessment: PET  Histologic grade (G): G3  Histologic grading system: 3 grade system  P16 overexpression: Positive      TREATMENT SUMMARY:  Course First Treatment Date 2025  Course Last Treatment Date 2025  Radiation Therapy Episodes       Active Episodes       Radiation Therapy: IMRT (3/3/2025)                   Radiation Treatments         Plan Last Treated On Elapsed Days Fractions Treated Prescribed Fraction Dose (cGy) Prescribed Total Dose (cGy)    HDR2 4/10/2025 38 @ 04/10/2025 1 of 1 600 600    HDR3 4/15/2025 43 @ 04/15/2025 1 of 1 600 600    HDR_1 2025 36 @ 2025 1 of 1 600 600    Pelvis 2025 44 @ 2025 22 of 25 240 6,000                  Reference Point Last Treated On Elapsed Days Most Recent Session Dose (cGy) Total Dose (cGy)    HDR 4/15/2025 43 @ 04/15/2025 600 1,800    HDR Pt1 4/10/2025 38 @ 04/10/2025 601 601    HDR Pt2 4/10/2025 38 @ 04/10/2025 622 622    HDR Pt3 4/10/2025 38 @ 04/10/2025 615 615    Pelvis 2025 44 @ 2025 240 5,280    fx1 pt1 2025 36 @ 2025 602 602    fx1 pt2 2025 36 @ 2025 592 592    fx1 pt3 2025 36 @ 2025 606 606    fx1 pt4 2025 36 @ 2025 603 603    fx3 pt1 4/15/2025 43 @ 04/15/2025 597 597    fx3 pt2 4/15/2025 43 @ 04/15/2025 589 589    fx3 pt3 4/15/2025 43 @ 04/15/2025 612  "612                               SUBJECTIVE:  Fatigue. Nausea. No vomiting. Diarrhea.    VITAL SIGNS:      4/16/2025     2:17 PM 4/16/2025    11:27 AM 4/16/2025     9:59 AM 4/16/2025     9:32 AM 4/16/2025     8:55 AM 4/15/2025     2:00 PM 4/15/2025     1:30 PM   Vitals   SYSTOLIC 145 127 115 120 129 162 162   DIASTOLIC 83 66 60 69 80 76 76   Pulse 92 82 86 90 77 108 89   Temperature 36.4 °C (97.6 °F) 36.7 °C (98 °F) 36.4 °C (97.6 °F) 36.4 °C (97.5 °F) 36.7 °C (98 °F)     Respiration  18 18 18 18 16 16   Weight 195    191.8     Height     1.54 m (5' 0.63\")     BMI 37.3 kg/m2    36.68 kg/m2     Pulse Oximetry 97 % 97 % 97 % 97 % 96 % 98 % 91 %     KPS: 70, Cares for self; unable to carry on normal activity or to do active work (ECOG equivalent 1)  Encounter Vitals  Temperature: 36.4 °C (97.6 °F)  Temp src: Temporal  Blood Pressure : (!) 145/83  Pulse: 92  Pulse Oximetry: 97 %  Weight: 88.5 kg (195 lb)  Pain Score: No pain      4/16/2025     2:17 PM 4/9/2025     2:56 PM 3/26/2025    10:06 AM 3/19/2025     9:13 AM 3/12/2025    10:31 AM 3/5/2025     9:16 AM   Pain Assessment   Pain Score NO PAIN NO PAIN NO PAIN NO PAIN NO PAIN NO PAIN          PHYSICAL EXAM:  Physical Exam         4/16/2025     2:17 PM 4/9/2025     2:58 PM 3/26/2025    10:11 AM 3/19/2025     9:17 AM 3/12/2025    10:33 AM 3/5/2025     9:18 AM   Toxicity Assessment   Toxicity Assessment Female Pelvis Female Pelvis Female Pelvis Female Pelvis Female Pelvis Female Pelvis   Fatigue (lethargy, malaise, asthenia) Severe (e.g., decrease in performance status by 2 or more ECOG levels or 40% Karn[place]sky) or loss of ability to perform some activities Severe (e.g., decrease in performance status by 2 or more ECOG levels or 40% Karn[place]sky) or loss of ability to perform some activities Moderate (e.g., decrease in performance status by 1 ECOG level or 20% Karn[place]sky) or causing difficulty performing some activities Moderate (e.g., decrease in performance status by 1 ECOG " level or 20% Karn[place]sky) or causing difficulty performing some activities Increased fatigue over baseline, but not altering normal activities None   Radiation Dermatitis None None None None None None   Anorexia Oral intake significantly decreased Loss of appetite Oral intake significantly decreased Loss of appetite Oral intake significantly decreased Oral intake significantly decreased   Colitis None None None None Abdominal pain with mucus and/or blood in stool None   Constipation None None None None None None   Dehydration Dry mucous membranes and/or diminished skin turgor Dry mucous membranes and/or diminished skin turgor None None Dry mucous membranes and/or diminished skin turgor None   Diarrhea w/o Colostomy Increase of <4 stools/day over pre-treatment None Increase of <4 stools/day over pre-treatment None Increase of <4 stools/day over pre-treatment None   Flatulence None None None Mild None None   Nausea Oral intake significantly decreased Oral intake significantly decreased Oral intake significantly decreased Able to eat Oral intake significantly decreased Oral intake significantly decreased   Proctitis None None None None Increased stool frequency, occasional blood-streaked stools or rectal discomfort (including hemorrhoids) not requiring medication None   Vomiting None None None None None None   RT - Pain due to RT None None None None None None   Tumor Pain (onset or exacerbation of tumor pain due to treatment) None None None None None None   Dysuria (painful urination) None None None None None None   Urinary Frequency Normal Normal Increase in frequency up to 2x normal Normal Normal Normal   Urinary Urgency None None Present None None None   Bladder Spasms Absent Absent Absent Absent Absent Absent   Incontinence None None None None None None   Urinary Retention Normal Normal Normal Normal Normal Normal   Vaginitis (not due to infection) None None None None None None   Vaginal Bleeding Spotting, requiring  <2 pads per day Spotting, requiring <2 pads per day None None None None   Vaginal Dryness Normal Normal Normal Normal Normal Normal       CURRENT MEDICATIONS:    Current Outpatient Medications:     prochlorperazine (COMPAZINE) 10 MG Tab, Take 10 mg by mouth every 6 hours as needed for Nausea/Vomiting. Pt can only take at night per side effects, Disp: , Rfl:     loperamide (IMODIUM) 2 MG Cap, Take 2 mg by mouth 4 times a day as needed for Diarrhea., Disp: , Rfl:     MAGNESIUM PO, Take 1 Tablet by mouth every day., Disp: , Rfl:     Cholecalciferol (VITAMIN D-3 PO), Take 1 Tablet by mouth every day., Disp: , Rfl:     ondansetron (ZOFRAN ODT) 4 MG TABLET DISPERSIBLE, Take 4 mg by mouth every 6 hours as needed for Nausea/Vomiting., Disp: , Rfl:   No current facility-administered medications for this encounter.    Facility-Administered Medications Ordered in Other Encounters:     [START ON 4/17/2025] ondansetron (Zofran) syringe/vial injection 4 mg, 4 mg, Intravenous, Q4HRS PRN, Ilda JEAN BAPTISTE M.D.    [START ON 4/17/2025] HYDROmorphone (Dilaudid) injection 0.25-0.5 mg, 0.25-0.5 mg, Intravenous, Q2HRS PRN, Ilda JEAN BAPTISTE M.D.    LABORATORY DATA:   Lab Results   Component Value Date/Time    SODIUM 140 04/02/2025 04:10 AM    POTASSIUM 3.7 04/02/2025 04:10 AM    CHLORIDE 104 04/02/2025 04:10 AM    CO2 25 04/02/2025 04:10 AM    GLUCOSE 127 (H) 04/02/2025 04:10 AM    BUN 15 04/02/2025 04:10 AM    CREATININE 1.10 04/02/2025 04:10 AM       Lab Results   Component Value Date/Time    WBC 1.6 (LL) 04/14/2025 02:12 PM    RBC 2.63 (L) 04/14/2025 02:12 PM    HEMOGLOBIN 7.3 (L) 04/14/2025 02:12 PM    HEMATOCRIT 21.7 (L) 04/14/2025 02:12 PM    MCV 82.5 04/14/2025 02:12 PM    MCH 27.8 04/14/2025 02:12 PM    MCHC 33.6 04/14/2025 02:12 PM    PLATELETCT 41 (L) 04/14/2025 02:12 PM         RADIOLOGY DATA:  DX-PELVIS-1 OR 2 VIEWS  Result Date: 4/15/2025  Digitized intraoperative radiograph is submitted for review. This examination is  not for diagnostic purpose but for guidance during a surgical procedure. Please see the patient's chart for full procedural details. INTERPRETING LOCATION: [address], [place], [address]    DX-PELVIS-1 OR 2 VIEWS  Result Date: 4/10/2025  Digitized intraoperative radiograph is submitted for review. This examination is not for diagnostic purpose but for guidance during a surgical procedure. Please see the patient's chart for full procedural details. INTERPRETING LOCATION: [address], [place], [address]    DX-PELVIS-1 OR 2 VIEWS  Result Date: 4/8/2025  Digitized intraoperative radiograph is submitted for review. This examination is not for diagnostic purpose but for guidance during a surgical procedure. Please see the patient's chart for full procedural details. INTERPRETING LOCATION: [address], [place], [address]    MR-PELVIS-WITH & W/O AND SEQUENCES  Result Date: 4/1/2025  1.  Stable appearance of the cervix with hypoattenuation anteriorly, unchanged from prior, likely posttreatment change. 2.  Stable presumed LEFT iliac lymph node showing only capsular enhancement, likely treated disease. 3.  No new adenopathy.    MR-PELVIS-WITH & W/O AND SEQUENCES  Result Date: 2/17/2025  There is a 1.8 x 1.4 cm hypoenhancing mass in the anterior lower uterine segment, likely residual mass. Mild irregularity of the anterior uterine wall but no involvement of the vagina. There is a 1.7 x 2.1 cm lobulated nonenhancing mass in the left internal iliac region. This demonstrates no uptake on recent PET CT, likely treated disease    DX-PORTABLE FLUORO > 1 HOUR  Result Date: 4/15/2025  Portable fluoroscopy utilized for 2 seconds. INTERPRETING LOCATION: [address], [place], [address]    DX-PORTABLE FLUORO > 1 HOUR  Result Date: 4/10/2025  Portable fluoroscopy utilized for 1 second. INTERPRETING LOCATION: [address], [place], [address]    DX-PORTABLE FLUORO > 1 HOUR  Result Date: 4/8/2025  Portable fluoroscopy utilized for 3 seconds. 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 on 2/14/2025      PLAN:  No change in treatment plan    Disposition:  Treatment plan and imaging reviewed. Questions answered. Continue therapy outlined.     Ilda JEAN BAPTISTE M.D.    No orders of the defined types were placed in this encounter.

## 2025-04-17 ENCOUNTER — HOSPITAL ENCOUNTER (OUTPATIENT)
Facility: MEDICAL CENTER | Age: 70
End: 2025-04-17
Attending: RADIOLOGY | Admitting: RADIOLOGY
Payer: MEDICARE

## 2025-04-17 ENCOUNTER — ANESTHESIA EVENT (OUTPATIENT)
Dept: SURGERY | Facility: MEDICAL CENTER | Age: 70
End: 2025-04-17
Payer: MEDICARE

## 2025-04-17 ENCOUNTER — APPOINTMENT (OUTPATIENT)
Dept: RADIOLOGY | Facility: MEDICAL CENTER | Age: 70
End: 2025-04-17
Attending: RADIOLOGY
Payer: MEDICARE

## 2025-04-17 ENCOUNTER — HOSPITAL ENCOUNTER (OUTPATIENT)
Dept: RADIATION ONCOLOGY | Facility: MEDICAL CENTER | Age: 70
End: 2025-04-17
Attending: RADIOLOGY
Payer: MEDICARE

## 2025-04-17 ENCOUNTER — ANESTHESIA (OUTPATIENT)
Dept: SURGERY | Facility: MEDICAL CENTER | Age: 70
End: 2025-04-17
Payer: MEDICARE

## 2025-04-17 VITALS
RESPIRATION RATE: 16 BRPM | DIASTOLIC BLOOD PRESSURE: 79 MMHG | TEMPERATURE: 98 F | OXYGEN SATURATION: 94 % | HEART RATE: 100 BPM | SYSTOLIC BLOOD PRESSURE: 159 MMHG | WEIGHT: 189.38 LBS | HEIGHT: 61 IN | BODY MASS INDEX: 35.75 KG/M2

## 2025-04-17 DIAGNOSIS — D61.818 PANCYTOPENIA (HCC): ICD-10-CM

## 2025-04-17 LAB
CHEMOTHERAPY INFUSION START DATE: NORMAL
CHEMOTHERAPY RECORDS: 6 GY
CHEMOTHERAPY RECORDS: 600 CGY
CHEMOTHERAPY RECORDS: NORMAL
CHEMOTHERAPY RX CANCER: NORMAL
DATE 1ST CHEMO CANCER: NORMAL
RAD ONC ARIA COURSE LAST TREATMENT DATE: NORMAL
RAD ONC ARIA COURSE TREATMENT ELAPSED DAYS: NORMAL
RAD ONC ARIA REFERENCE POINT DOSAGE GIVEN TO DATE: 24 GY
RAD ONC ARIA REFERENCE POINT DOSAGE GIVEN TO DATE: 4.31 GY
RAD ONC ARIA REFERENCE POINT DOSAGE GIVEN TO DATE: 5.26 GY
RAD ONC ARIA REFERENCE POINT DOSAGE GIVEN TO DATE: 5.49 GY
RAD ONC ARIA REFERENCE POINT ID: NORMAL
RAD ONC ARIA REFERENCE POINT SESSION DOSAGE GIVEN: 4.31 GY
RAD ONC ARIA REFERENCE POINT SESSION DOSAGE GIVEN: 5.26 GY
RAD ONC ARIA REFERENCE POINT SESSION DOSAGE GIVEN: 5.49 GY
RAD ONC ARIA REFERENCE POINT SESSION DOSAGE GIVEN: 6 GY

## 2025-04-17 PROCEDURE — 77334 RADIATION TREATMENT AID(S): CPT | Performed by: RADIOLOGY

## 2025-04-17 PROCEDURE — 160035 HCHG PACU - 1ST 60 MINS PHASE I: Performed by: RADIOLOGY

## 2025-04-17 PROCEDURE — 57155 INSERT UTERI TANDEM/OVOIDS: CPT | Performed by: RADIOLOGY

## 2025-04-17 PROCEDURE — 160046 HCHG PACU - 1ST 60 MINS PHASE II: Performed by: RADIOLOGY

## 2025-04-17 PROCEDURE — 77290 THER RAD SIMULAJ FIELD CPLX: CPT | Mod: 26 | Performed by: RADIOLOGY

## 2025-04-17 PROCEDURE — 77295 3-D RADIOTHERAPY PLAN: CPT | Performed by: RADIOLOGY

## 2025-04-17 PROCEDURE — 77290 THER RAD SIMULAJ FIELD CPLX: CPT | Performed by: RADIOLOGY

## 2025-04-17 PROCEDURE — 700117 HCHG RX CONTRAST REV CODE 255: Performed by: RADIOLOGY

## 2025-04-17 PROCEDURE — 77295 3-D RADIOTHERAPY PLAN: CPT | Mod: 26 | Performed by: RADIOLOGY

## 2025-04-17 PROCEDURE — 160009 HCHG ANES TIME/MIN: Performed by: RADIOLOGY

## 2025-04-17 PROCEDURE — 700111 HCHG RX REV CODE 636 W/ 250 OVERRIDE (IP): Mod: JZ | Performed by: ANESTHESIOLOGY

## 2025-04-17 PROCEDURE — 160048 HCHG OR STATISTICAL LEVEL 1-5: Performed by: RADIOLOGY

## 2025-04-17 PROCEDURE — 160047 HCHG PACU  - EA ADDL 30 MINS PHASE II: Performed by: RADIOLOGY

## 2025-04-17 PROCEDURE — 160025 RECOVERY II MINUTES (STATS): Performed by: RADIOLOGY

## 2025-04-17 PROCEDURE — 160029 HCHG SURGERY MINUTES - 1ST 30 MINS LEVEL 4: Performed by: RADIOLOGY

## 2025-04-17 PROCEDURE — 77470 SPECIAL RADIATION TREATMENT: CPT | Performed by: RADIOLOGY

## 2025-04-17 PROCEDURE — C1717 BRACHYTX, NON-STR,HDR IR-192: HCPCS | Performed by: RADIOLOGY

## 2025-04-17 PROCEDURE — 160002 HCHG RECOVERY MINUTES (STAT): Performed by: RADIOLOGY

## 2025-04-17 PROCEDURE — 77332 RADIATION TREATMENT AID(S): CPT | Performed by: RADIOLOGY

## 2025-04-17 PROCEDURE — 700111 HCHG RX REV CODE 636 W/ 250 OVERRIDE (IP): Mod: JZ

## 2025-04-17 PROCEDURE — 160015 HCHG STAT PREOP MINUTES: Performed by: RADIOLOGY

## 2025-04-17 PROCEDURE — 77333 RADIATION TREATMENT AID(S): CPT | Mod: 26 | Performed by: RADIOLOGY

## 2025-04-17 PROCEDURE — 77771 HDR RDNCL NTRSTL/ICAV BRCHTX: CPT | Mod: 26 | Performed by: RADIOLOGY

## 2025-04-17 PROCEDURE — 76000 FLUOROSCOPY <1 HR PHYS/QHP: CPT | Mod: 26,XU | Performed by: RADIOLOGY

## 2025-04-17 PROCEDURE — C1755 CATHETER, INTRASPINAL: HCPCS | Performed by: RADIOLOGY

## 2025-04-17 PROCEDURE — 77771 HDR RDNCL NTRSTL/ICAV BRCHTX: CPT | Performed by: RADIOLOGY

## 2025-04-17 PROCEDURE — 700105 HCHG RX REV CODE 258: Performed by: ANESTHESIOLOGY

## 2025-04-17 PROCEDURE — 160041 HCHG SURGERY MINUTES - EA ADDL 1 MIN LEVEL 4: Performed by: RADIOLOGY

## 2025-04-17 PROCEDURE — 72170 X-RAY EXAM OF PELVIS: CPT

## 2025-04-17 RX ORDER — MEPERIDINE HYDROCHLORIDE 25 MG/ML
12.5 INJECTION INTRAMUSCULAR; INTRAVENOUS; SUBCUTANEOUS
Status: DISCONTINUED | OUTPATIENT
Start: 2025-04-17 | End: 2025-04-17 | Stop reason: HOSPADM

## 2025-04-17 RX ORDER — CEFAZOLIN SODIUM 1 G/3ML
INJECTION, POWDER, FOR SOLUTION INTRAMUSCULAR; INTRAVENOUS PRN
Status: DISCONTINUED | OUTPATIENT
Start: 2025-04-17 | End: 2025-04-17 | Stop reason: SURG

## 2025-04-17 RX ORDER — HYDROMORPHONE HYDROCHLORIDE 1 MG/ML
0.2 INJECTION, SOLUTION INTRAMUSCULAR; INTRAVENOUS; SUBCUTANEOUS
Status: DISCONTINUED | OUTPATIENT
Start: 2025-04-17 | End: 2025-04-17 | Stop reason: HOSPADM

## 2025-04-17 RX ORDER — MIDAZOLAM HYDROCHLORIDE 1 MG/ML
1 INJECTION INTRAMUSCULAR; INTRAVENOUS
Status: DISCONTINUED | OUTPATIENT
Start: 2025-04-17 | End: 2025-04-17 | Stop reason: HOSPADM

## 2025-04-17 RX ORDER — OXYCODONE HCL 5 MG/5 ML
10 SOLUTION, ORAL ORAL
Status: DISCONTINUED | OUTPATIENT
Start: 2025-04-17 | End: 2025-04-17 | Stop reason: HOSPADM

## 2025-04-17 RX ORDER — IODIXANOL 270 MG/ML
INJECTION, SOLUTION INTRAVASCULAR
Status: DISCONTINUED
Start: 2025-04-17 | End: 2025-04-17 | Stop reason: HOSPADM

## 2025-04-17 RX ORDER — HYDROMORPHONE HYDROCHLORIDE 1 MG/ML
0.4 INJECTION, SOLUTION INTRAMUSCULAR; INTRAVENOUS; SUBCUTANEOUS
Status: DISCONTINUED | OUTPATIENT
Start: 2025-04-17 | End: 2025-04-17 | Stop reason: HOSPADM

## 2025-04-17 RX ORDER — HALOPERIDOL 5 MG/ML
1 INJECTION INTRAMUSCULAR
Status: DISCONTINUED | OUTPATIENT
Start: 2025-04-17 | End: 2025-04-17 | Stop reason: HOSPADM

## 2025-04-17 RX ORDER — OXYCODONE HCL 5 MG/5 ML
5 SOLUTION, ORAL ORAL
Status: DISCONTINUED | OUTPATIENT
Start: 2025-04-17 | End: 2025-04-17 | Stop reason: HOSPADM

## 2025-04-17 RX ORDER — DIPHENHYDRAMINE HYDROCHLORIDE 50 MG/ML
12.5 INJECTION, SOLUTION INTRAMUSCULAR; INTRAVENOUS
Status: DISCONTINUED | OUTPATIENT
Start: 2025-04-17 | End: 2025-04-17 | Stop reason: HOSPADM

## 2025-04-17 RX ORDER — ONDANSETRON 2 MG/ML
4 INJECTION INTRAMUSCULAR; INTRAVENOUS EVERY 4 HOURS PRN
Status: DISCONTINUED | OUTPATIENT
Start: 2025-04-17 | End: 2025-04-17 | Stop reason: HOSPADM

## 2025-04-17 RX ORDER — BUPIVACAINE HYDROCHLORIDE 5 MG/ML
INJECTION, SOLUTION EPIDURAL; INTRACAUDAL; PERINEURAL PRN
Status: DISCONTINUED | OUTPATIENT
Start: 2025-04-17 | End: 2025-04-17 | Stop reason: SURG

## 2025-04-17 RX ORDER — IPRATROPIUM BROMIDE AND ALBUTEROL SULFATE 2.5; .5 MG/3ML; MG/3ML
3 SOLUTION RESPIRATORY (INHALATION)
Status: DISCONTINUED | OUTPATIENT
Start: 2025-04-17 | End: 2025-04-17 | Stop reason: HOSPADM

## 2025-04-17 RX ORDER — MIDAZOLAM HYDROCHLORIDE 1 MG/ML
INJECTION INTRAMUSCULAR; INTRAVENOUS PRN
Status: DISCONTINUED | OUTPATIENT
Start: 2025-04-17 | End: 2025-04-17 | Stop reason: SURG

## 2025-04-17 RX ORDER — HYDROMORPHONE HYDROCHLORIDE 1 MG/ML
1 INJECTION, SOLUTION INTRAMUSCULAR; INTRAVENOUS; SUBCUTANEOUS
Status: DISCONTINUED | OUTPATIENT
Start: 2025-04-17 | End: 2025-04-17 | Stop reason: HOSPADM

## 2025-04-17 RX ORDER — DEXAMETHASONE SODIUM PHOSPHATE 4 MG/ML
INJECTION, SOLUTION INTRA-ARTICULAR; INTRALESIONAL; INTRAMUSCULAR; INTRAVENOUS; SOFT TISSUE PRN
Status: DISCONTINUED | OUTPATIENT
Start: 2025-04-17 | End: 2025-04-17 | Stop reason: SURG

## 2025-04-17 RX ORDER — BUPIVACAINE HYDROCHLORIDE 5 MG/ML
INJECTION, SOLUTION EPIDURAL; INTRACAUDAL; PERINEURAL
Status: COMPLETED
Start: 2025-04-17 | End: 2025-04-17

## 2025-04-17 RX ORDER — SODIUM CHLORIDE, SODIUM LACTATE, POTASSIUM CHLORIDE, CALCIUM CHLORIDE 600; 310; 30; 20 MG/100ML; MG/100ML; MG/100ML; MG/100ML
INJECTION, SOLUTION INTRAVENOUS
Status: DISCONTINUED | OUTPATIENT
Start: 2025-04-17 | End: 2025-04-17 | Stop reason: SURG

## 2025-04-17 RX ORDER — ONDANSETRON 2 MG/ML
4 INJECTION INTRAMUSCULAR; INTRAVENOUS
Status: DISCONTINUED | OUTPATIENT
Start: 2025-04-17 | End: 2025-04-17 | Stop reason: HOSPADM

## 2025-04-17 RX ORDER — BACITRACIN ZINC 500 [USP'U]/G
OINTMENT TOPICAL
Status: DISCONTINUED
Start: 2025-04-17 | End: 2025-04-17 | Stop reason: HOSPADM

## 2025-04-17 RX ORDER — ONDANSETRON 2 MG/ML
INJECTION INTRAMUSCULAR; INTRAVENOUS
Status: COMPLETED
Start: 2025-04-17 | End: 2025-04-17

## 2025-04-17 RX ADMIN — DEXAMETHASONE SODIUM PHOSPHATE 8 MG: 4 INJECTION INTRA-ARTICULAR; INTRALESIONAL; INTRAMUSCULAR; INTRAVENOUS; SOFT TISSUE at 09:50

## 2025-04-17 RX ADMIN — CEFAZOLIN 2 G: 1 INJECTION, POWDER, FOR SOLUTION INTRAMUSCULAR; INTRAVENOUS at 09:37

## 2025-04-17 RX ADMIN — PROPOFOL 20 MG: 10 INJECTION, EMULSION INTRAVENOUS at 09:48

## 2025-04-17 RX ADMIN — FENTANYL CITRATE 25 MCG: 50 INJECTION, SOLUTION INTRAMUSCULAR; INTRAVENOUS at 09:42

## 2025-04-17 RX ADMIN — PROPOFOL 20 MG: 10 INJECTION, EMULSION INTRAVENOUS at 09:44

## 2025-04-17 RX ADMIN — PROPOFOL 20 MG: 10 INJECTION, EMULSION INTRAVENOUS at 09:56

## 2025-04-17 RX ADMIN — SODIUM CHLORIDE, POTASSIUM CHLORIDE, SODIUM LACTATE AND CALCIUM CHLORIDE: 600; 310; 30; 20 INJECTION, SOLUTION INTRAVENOUS at 09:26

## 2025-04-17 RX ADMIN — ONDANSETRON 4 MG: 2 INJECTION INTRAMUSCULAR; INTRAVENOUS at 13:10

## 2025-04-17 RX ADMIN — BUPIVACAINE HYDROCHLORIDE 2 ML: 5 INJECTION, SOLUTION EPIDURAL; INTRACAUDAL at 09:42

## 2025-04-17 RX ADMIN — PROPOFOL 20 MG: 10 INJECTION, EMULSION INTRAVENOUS at 09:52

## 2025-04-17 RX ADMIN — MIDAZOLAM HYDROCHLORIDE 2 MG: 1 INJECTION, SOLUTION INTRAMUSCULAR; INTRAVENOUS at 09:37

## 2025-04-17 ASSESSMENT — PAIN DESCRIPTION - PAIN TYPE
TYPE: SURGICAL PAIN

## 2025-04-17 ASSESSMENT — PAIN SCALES - GENERAL: PAIN_LEVEL: 0

## 2025-04-17 ASSESSMENT — FIBROSIS 4 INDEX: FIB4 SCORE: 10.1

## 2025-04-17 NOTE — PROGRESS NOTES
1008- Pt report from PACU RN.  Pt A&O x 4, slightly drowsy.  b/p 97/54, HR 87, RR 14, O2 sat 98% 2L.  Pt denies pain, slight B foot and slight R knee sensation, very slight L foot gross motor movement noted. Pt advised not to move BLE until after device removal.  No bleeding at device site noted.  Kevin in place and clamped.  R chest port accessed, LR infusing at tko.    1017- Pt transported to [facility].    1020- 1107/57, 84, 14, 99% 2L.  Pt remains A&O x 4, continues to deny any pain, no changes to BLE sensation or movement.  No other changes noted  1030- To rm 7.  126/73, 77, 16, 98% 1:, 97.6f.  Pt remains A&O x4.  Cont to deny pain.  B feet slight movement L>R.  Pt advised not to move BLE until after device removal.  Slight B foot and slight R knee sensation.  No bleeding at device site noted.  SCD's in place, call light in reach.  Sips of H20 provided.   1045- 129/82, 70, 14, 98% 1L.  Resting quietly.   1100- 1L.  Resting quietly.  SCD's in place.  Call light in reach.  1107- To tx vault.  Setting up for tx.   1115- 146/83, 83, 16, 97% 1L, 97.1f.  Tx ongoing.    1118- tx start.  1130- tx end.  Monitoring ongoing throughout.     1130- 157/88, 85, 16, 96% 1L.    1132- Device removed by carmella Shea d/c'd by RN per Dr Diaz's verbal order.  Slight bleeding at device site noted, pressure held.  Pt continues to deny pain, has moderate B foot movement, slight LLE gross motor movement. Moderate B foot sensation, slight to R knee.    1143- Pt to PACU.  Report to PACU RN.  d/c instructions, pre-op instructions and schedule for next procedures reviewed/provided.  Pt vu of all teaching.

## 2025-04-17 NOTE — PROCEDURES
Date of Service:  04/17/25    Referring Physician: No ref. 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 on 2/14/2025  Histopathologic type: Small cell carcinoma, NOS  Stage prefix: Initial diagnosis  Para-aortic status: Positive  Para-aortic zackary method of assessment: PET  Histologic grade (G): G3  Histologic grading system: 3 grade system  P16 overexpression: Positive       PRESCRIPTION:     Radiation Therapy Episodes       Active Episodes       Radiation Therapy: IMRT (3/3/2025)                   Radiation Treatments         Plan Last Treated On Elapsed Days Fractions Treated Prescribed Fraction Dose (cGy) Prescribed Total Dose (cGy)    HDR2 4/10/2025 38 @ 04/10/2025 1 of 1 600 600    HDR3 4/15/2025 43 @ 04/15/2025 1 of 1 600 600    HDR4 4/17/2025 45 @ 04/17/2025 1 of 1 600 600    HDR_1 4/8/2025 36 @ 04/08/2025 1 of 1 600 600    Pelvis 4/16/2025 44 @ 04/16/2025 22 of 25 240 6,000                  Reference Point Last Treated On Elapsed Days Most Recent Session Dose (cGy) Total Dose (cGy)    Fx4 Pt1 4/17/2025 45 @ 04/17/2025 526 526    Fx4 Pt2 4/17/2025 45 @ 04/17/2025 549 549    Fx4 Pt3 4/17/2025 45 @ 04/17/2025 431 431    HDR 4/17/2025 45 @ 04/17/2025 600 2,400    HDR Pt1 4/10/2025 38 @ 04/10/2025 601 601    HDR Pt2 4/10/2025 38 @ 04/10/2025 622 622    HDR Pt3 4/10/2025 38 @ 04/10/2025 615 615    Pelvis 4/16/2025 44 @ 04/16/2025 240 5,280    fx1 pt1 4/8/2025 36 @ 04/08/2025 602 602    fx1 pt2 4/8/2025 36 @ 04/08/2025 592 592    fx1 pt3 4/8/2025 36 @ 04/08/2025 606 606    fx1 pt4 4/8/2025 36 @ 04/08/2025 603 603    fx3 pt1 4/15/2025 43 @ 04/15/2025 597 597    fx3 pt2 4/15/2025 43 @ 04/15/2025 589 589    fx3 pt3 4/15/2025 43 @ 04/15/2025 612 612                            TX NUMBER: 4    MARKER: [] Titanium   [x]  Gold    SIMULATION GYN HDR BRACHYTHERAPY:    Patient had CT in department after placement of Gyn brachytherapy device in the OR. Please see separate OP note. Position verified and images transferred to [facility].    Plan generated and approved.    In Brachy suite HDR device was attached to the HDR unit via transfer catheters. The setup was verified by myself and physicist and treatment delivered.    I have personally reviewed the relevant data, performed the target localization, and determined all relevant factors for this patient’s simulation.

## 2025-04-17 NOTE — OR NURSING
1144 - Patient arrived to PACU from IR. Report received from RN. Patient attached to monitoring. VSS. Patient oxygenating well on 1 L via NC.     1200 Patient eating a popsicle. Patient meets phase two criteria. Tolerating PO liquids without complication.     1310 Patient requesting zofran for nausea. Iv zofran administered.    1320 Patient ambulated to restroom with steady gait. Voided x1. Dressed self without assistance.     1330 RN went over discharge instructions with patient. All questions answered.     1340 Port de accessed removed by RN. Patient meets discharge criteria. VSS. Pt discharged to a responsible adult via wheelchair by RN. Pt in possession of all personal belongings.

## 2025-04-17 NOTE — ANESTHESIA PROCEDURE NOTES
Spinal Block    Date/Time: 4/17/2025 9:39 AM    Performed by: Moises Mckee III, M.D.  Authorized by: Moises Mckee III, M.D.    Patient Location:  OR  Start Time:  4/17/2025 9:39 AM  End Time:  4/17/2025 9:42 AM  Reason for Block: primary anesthetic    patient identified, IV checked, site marked, risks and benefits discussed, surgical consent, monitors and equipment checked, pre-op evaluation and timeout performed    Patient Position:  Sitting  Prep: ChloraPrep, patient draped and sterile technique    Monitoring:  Blood pressure, continuous pulse oximetry and heart rate  Approach:  Midline  Location:  L2-3  Injection Technique:  Single-shot  Skin infiltration:  Lidocaine  Strength:  1%  Dose:  3ml  Needle Type:  Pencan  Needle Gauge:  25 G  CSF flowing pre/post injection:  Yes  Sensory Level:  T10

## 2025-04-17 NOTE — ANESTHESIA POSTPROCEDURE EVALUATION
Patient: Diane Barrett    Procedure Summary       Date: 04/17/25 Room / Location: [facility] ROOM 27 / SURGERY SAME DAY [facility]    Anesthesia Start: 0926 Anesthesia Stop: 1007    Procedure: HIGH DOSE GYNECOLOGICAL BRACHYTHERAPY - INTERSTITIAL (Cervix) Diagnosis: (CERVICAL CANCER)    Surgeons: Ilda JEAN BAPTISTE M.D. Responsible Provider: Moises Mckee III, M.D.    Anesthesia Type: spinal ASA Status: 3            Final Anesthesia Type: spinal  Last vitals  BP   Blood Pressure : 97/54    Temp   36.8 °C (98.2 °F)    Pulse   89   Resp   15    SpO2   97 %      Anesthesia Post Evaluation    Patient location during evaluation: PACU  Patient participation: complete - patient participated  Level of consciousness: awake and alert  Pain score: 0    Airway patency: patent  Anesthetic complications: no  Cardiovascular status: hemodynamically stable  Respiratory status: acceptable  Hydration status: euvolemic    PONV: none          No notable events documented.     Nurse Pain Score: 0 (NPRS)

## 2025-04-17 NOTE — ANESTHESIA TIME REPORT
Anesthesia Start and Stop Event Times       Date Time Event    4/17/2025 0906 Ready for Procedure     0926 Anesthesia Start     1007 Anesthesia Stop          Responsible Staff  04/17/25      Name Role Begin End    Moises Mckee III, M.D. Anesth 0926 1007          Overtime Reason:  no overtime (within assigned shift)    Comments:

## 2025-04-17 NOTE — OP REPORT
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 on 2/14/2025  Histopathologic type: Small cell carcinoma, NOS  Stage prefix: Initial diagnosis  Para-aortic status: Positive  Para-aortic zackary method of assessment: PET  Histologic grade (G): G3  Histologic grading system: 3 grade system  P16 overexpression: Positive      POSTOPERATIVE DIAGNOSIS: Same    PRIMARY SURGEON: Ilda Diaz MD     PROCEDURE: # 4, 15 degree tandem and small ovoids insertion for brachytherapy.     ANESTHESIOLOGIST: Moises Mckee    TYPE OF ANESTHESIA: Spinal     ESTIMATED BLOOD LOSS: 0 cc    DESCRIPTION OF OPERATIVE NOTE: Patient was prepped and draped in a sterile fashion and a Kevin catheter was inserted.  8 cm smitt sleeve previously inserted found.  Next,a 15-degree tandem and small ovoids were inserted with careful visualization of the anatomy. Once appropriate positioning was verified using fluoroscopy, then vaginal packing was utilized to both hold the device in place  and maximize displacement of the  surrounding normal structures. Patient transferred to [facility]. Stabilizer board used to hold device in place.     DISPOSITION: Patient was released to recovery in good condition and will be sent to Radiation Oncology for CT planning and radiation delivery.       ___________________________________   Ilda Diaz MD

## 2025-04-17 NOTE — ANESTHESIA PREPROCEDURE EVALUATION
Case: [id number] Date/Time: 04/17/25 0915    Procedure: HIGH DOSE GYNECOLOGICAL BRACHYTHERAPY - INTERSTITIAL (Cervix)    Anesthesia type: General    Pre-op diagnosis: CERVICAL CANCER    Location: [facility] ROOM 27 / SURGERY SAME DAY [facility]    Surgeons: Ilda JEAN BAPTISTE M.D.            Relevant Problems   CARDIAC   (positive) Vascular injury      GI   (positive) Peptic ulcer disease       Physical Exam    Airway   Mallampati: III  TM distance: >3 FB  Neck ROM: limited       Cardiovascular - normal exam  Rhythm: regular  Rate: normal  (-) murmur     Dental - normal exam           Pulmonary - normal exam  Breath sounds clear to auscultation     Abdominal   (+) obese     Neurological - normal exam                   Anesthesia Plan    ASA 3       Plan - spinal   Neuraxial block will be primary anesthetic            Induction: intravenous    Postoperative Plan: Postoperative administration of opioids is intended.    Pertinent diagnostic labs and testing reviewed    Informed Consent:    Anesthetic plan and risks discussed with patient.    Use of blood products discussed with: patient whom consented to blood products.

## 2025-04-18 ENCOUNTER — HOSPITAL ENCOUNTER (OUTPATIENT)
Dept: RADIATION ONCOLOGY | Facility: MEDICAL CENTER | Age: 70
End: 2025-04-18
Payer: MEDICARE

## 2025-04-18 LAB
CHEMOTHERAPY INFUSION START DATE: NORMAL
CHEMOTHERAPY RECORDS: 2.4 GY
CHEMOTHERAPY RECORDS: 6000 CGY
CHEMOTHERAPY RECORDS: NORMAL
CHEMOTHERAPY RX CANCER: NORMAL
DATE 1ST CHEMO CANCER: NORMAL
RAD ONC ARIA COURSE LAST TREATMENT DATE: NORMAL
RAD ONC ARIA COURSE TREATMENT ELAPSED DAYS: NORMAL
RAD ONC ARIA REFERENCE POINT DOSAGE GIVEN TO DATE: 55.2 GY
RAD ONC ARIA REFERENCE POINT ID: NORMAL
RAD ONC ARIA REFERENCE POINT SESSION DOSAGE GIVEN: 2.4 GY

## 2025-04-18 PROCEDURE — 77336 RADIATION PHYSICS CONSULT: CPT | Performed by: RADIOLOGY

## 2025-04-18 PROCEDURE — 77014 PR CT GUIDANCE PLACEMENT RAD THERAPY FIELDS: CPT | Mod: 26 | Performed by: RADIOLOGY

## 2025-04-18 PROCEDURE — 77386 HCHG IMRT DELIVERY COMPLEX: CPT | Performed by: RADIOLOGY

## 2025-04-21 ENCOUNTER — HOSPITAL ENCOUNTER (OUTPATIENT)
Dept: RADIATION ONCOLOGY | Facility: MEDICAL CENTER | Age: 70
End: 2025-04-21
Payer: MEDICARE

## 2025-04-21 ENCOUNTER — HOSPITAL ENCOUNTER (OUTPATIENT)
Dept: LAB | Facility: MEDICAL CENTER | Age: 70
End: 2025-04-21
Attending: RADIOLOGY
Payer: MEDICARE

## 2025-04-21 DIAGNOSIS — D61.818 PANCYTOPENIA (HCC): ICD-10-CM

## 2025-04-21 LAB
BASOPHILS # BLD AUTO: 0.4 % (ref 0–1.8)
BASOPHILS # BLD: 0.01 K/UL (ref 0–0.12)
CHEMOTHERAPY INFUSION START DATE: NORMAL
CHEMOTHERAPY RECORDS: 2.4 GY
CHEMOTHERAPY RECORDS: 6000 CGY
CHEMOTHERAPY RECORDS: NORMAL
CHEMOTHERAPY RX CANCER: NORMAL
DATE 1ST CHEMO CANCER: NORMAL
EOSINOPHIL # BLD AUTO: 0.07 K/UL (ref 0–0.51)
EOSINOPHIL NFR BLD: 2.5 % (ref 0–6.9)
ERYTHROCYTE [DISTWIDTH] IN BLOOD BY AUTOMATED COUNT: 48.3 FL (ref 35.9–50)
HCT VFR BLD AUTO: 29.9 % (ref 37–47)
HGB BLD-MCNC: 9.9 G/DL (ref 12–16)
IMM GRANULOCYTES # BLD AUTO: 0.07 K/UL (ref 0–0.11)
IMM GRANULOCYTES NFR BLD AUTO: 2.5 % (ref 0–0.9)
LYMPHOCYTES # BLD AUTO: 0.54 K/UL (ref 1–4.8)
LYMPHOCYTES NFR BLD: 18.9 % (ref 22–41)
MCH RBC QN AUTO: 28.7 PG (ref 27–33)
MCHC RBC AUTO-ENTMCNC: 33.1 G/DL (ref 32.2–35.5)
MCV RBC AUTO: 86.7 FL (ref 81.4–97.8)
MONOCYTES # BLD AUTO: 0.65 K/UL (ref 0–0.85)
MONOCYTES NFR BLD AUTO: 22.8 % (ref 0–13.4)
NEUTROPHILS # BLD AUTO: 1.51 K/UL (ref 1.82–7.42)
NEUTROPHILS NFR BLD: 52.9 % (ref 44–72)
NRBC # BLD AUTO: 0 K/UL
NRBC BLD-RTO: 0 /100 WBC (ref 0–0.2)
PLATELET # BLD AUTO: 80 K/UL (ref 164–446)
PLATELETS.RETICULATED NFR BLD AUTO: 4.6 % (ref 0.6–13.1)
PMV BLD AUTO: 11.5 FL (ref 9–12.9)
RAD ONC ARIA COURSE LAST TREATMENT DATE: NORMAL
RAD ONC ARIA COURSE TREATMENT ELAPSED DAYS: NORMAL
RAD ONC ARIA REFERENCE POINT DOSAGE GIVEN TO DATE: 57.6 GY
RAD ONC ARIA REFERENCE POINT ID: NORMAL
RAD ONC ARIA REFERENCE POINT SESSION DOSAGE GIVEN: 2.4 GY
RBC # BLD AUTO: 3.45 M/UL (ref 4.2–5.4)
WBC # BLD AUTO: 2.9 K/UL (ref 4.8–10.8)

## 2025-04-21 PROCEDURE — 77014 PR CT GUIDANCE PLACEMENT RAD THERAPY FIELDS: CPT | Mod: 26 | Performed by: RADIOLOGY

## 2025-04-21 PROCEDURE — 85025 COMPLETE CBC W/AUTO DIFF WBC: CPT

## 2025-04-21 PROCEDURE — 85055 RETICULATED PLATELET ASSAY: CPT

## 2025-04-21 PROCEDURE — 36415 COLL VENOUS BLD VENIPUNCTURE: CPT

## 2025-04-21 PROCEDURE — 77386 HCHG IMRT DELIVERY COMPLEX: CPT | Performed by: RADIOLOGY

## 2025-04-22 ENCOUNTER — APPOINTMENT (OUTPATIENT)
Dept: RADIOLOGY | Facility: MEDICAL CENTER | Age: 70
End: 2025-04-22
Attending: RADIOLOGY
Payer: MEDICARE

## 2025-04-22 ENCOUNTER — HOSPITAL ENCOUNTER (OUTPATIENT)
Facility: MEDICAL CENTER | Age: 70
End: 2025-04-22
Attending: RADIOLOGY | Admitting: RADIOLOGY
Payer: MEDICARE

## 2025-04-22 ENCOUNTER — ANESTHESIA EVENT (OUTPATIENT)
Dept: SURGERY | Facility: MEDICAL CENTER | Age: 70
End: 2025-04-22
Payer: MEDICARE

## 2025-04-22 ENCOUNTER — ANESTHESIA (OUTPATIENT)
Dept: SURGERY | Facility: MEDICAL CENTER | Age: 70
End: 2025-04-22
Payer: MEDICARE

## 2025-04-22 ENCOUNTER — HOSPITAL ENCOUNTER (OUTPATIENT)
Dept: RADIATION ONCOLOGY | Facility: MEDICAL CENTER | Age: 70
End: 2025-04-22

## 2025-04-22 VITALS
TEMPERATURE: 97.1 F | WEIGHT: 190.48 LBS | HEIGHT: 61 IN | BODY MASS INDEX: 35.96 KG/M2 | HEART RATE: 105 BPM | DIASTOLIC BLOOD PRESSURE: 73 MMHG | OXYGEN SATURATION: 94 % | RESPIRATION RATE: 18 BRPM | SYSTOLIC BLOOD PRESSURE: 153 MMHG

## 2025-04-22 DIAGNOSIS — C53.8 MALIGNANT NEOPLASM OVERLAPPING CERVIX UTERI SITE (HCC): ICD-10-CM

## 2025-04-22 LAB
CHEMOTHERAPY INFUSION START DATE: NORMAL
CHEMOTHERAPY RECORDS: 6 GY
CHEMOTHERAPY RECORDS: 600 CGY
CHEMOTHERAPY RECORDS: NORMAL
CHEMOTHERAPY RX CANCER: NORMAL
DATE 1ST CHEMO CANCER: NORMAL
RAD ONC ARIA COURSE LAST TREATMENT DATE: NORMAL
RAD ONC ARIA COURSE TREATMENT ELAPSED DAYS: NORMAL
RAD ONC ARIA REFERENCE POINT DOSAGE GIVEN TO DATE: 30 GY
RAD ONC ARIA REFERENCE POINT DOSAGE GIVEN TO DATE: 5.86 GY
RAD ONC ARIA REFERENCE POINT DOSAGE GIVEN TO DATE: 5.9 GY
RAD ONC ARIA REFERENCE POINT DOSAGE GIVEN TO DATE: 6.04 GY
RAD ONC ARIA REFERENCE POINT ID: NORMAL
RAD ONC ARIA REFERENCE POINT SESSION DOSAGE GIVEN: 5.86 GY
RAD ONC ARIA REFERENCE POINT SESSION DOSAGE GIVEN: 5.9 GY
RAD ONC ARIA REFERENCE POINT SESSION DOSAGE GIVEN: 6 GY
RAD ONC ARIA REFERENCE POINT SESSION DOSAGE GIVEN: 6.04 GY

## 2025-04-22 PROCEDURE — 160035 HCHG PACU - 1ST 60 MINS PHASE I: Performed by: RADIOLOGY

## 2025-04-22 PROCEDURE — 160046 HCHG PACU - 1ST 60 MINS PHASE II: Performed by: RADIOLOGY

## 2025-04-22 PROCEDURE — 77332 RADIATION TREATMENT AID(S): CPT | Performed by: RADIOLOGY

## 2025-04-22 PROCEDURE — 76000 FLUOROSCOPY <1 HR PHYS/QHP: CPT | Mod: 26,XU | Performed by: RADIOLOGY

## 2025-04-22 PROCEDURE — C1717 BRACHYTX, NON-STR,HDR IR-192: HCPCS | Performed by: RADIOLOGY

## 2025-04-22 PROCEDURE — 160041 HCHG SURGERY MINUTES - EA ADDL 1 MIN LEVEL 4: Performed by: RADIOLOGY

## 2025-04-22 PROCEDURE — 77333 RADIATION TREATMENT AID(S): CPT | Mod: 26 | Performed by: RADIOLOGY

## 2025-04-22 PROCEDURE — 160025 RECOVERY II MINUTES (STATS): Performed by: RADIOLOGY

## 2025-04-22 PROCEDURE — 57155 INSERT UTERI TANDEM/OVOIDS: CPT | Performed by: RADIOLOGY

## 2025-04-22 PROCEDURE — 77771 HDR RDNCL NTRSTL/ICAV BRCHTX: CPT | Mod: 26 | Performed by: RADIOLOGY

## 2025-04-22 PROCEDURE — 700105 HCHG RX REV CODE 258: Performed by: RADIOLOGY

## 2025-04-22 PROCEDURE — 160048 HCHG OR STATISTICAL LEVEL 1-5: Performed by: RADIOLOGY

## 2025-04-22 PROCEDURE — 77295 3-D RADIOTHERAPY PLAN: CPT | Mod: 26 | Performed by: RADIOLOGY

## 2025-04-22 PROCEDURE — 700101 HCHG RX REV CODE 250: Performed by: ANESTHESIOLOGY

## 2025-04-22 PROCEDURE — 700117 HCHG RX CONTRAST REV CODE 255: Performed by: RADIOLOGY

## 2025-04-22 PROCEDURE — 160002 HCHG RECOVERY MINUTES (STAT): Performed by: RADIOLOGY

## 2025-04-22 PROCEDURE — 77290 THER RAD SIMULAJ FIELD CPLX: CPT | Performed by: RADIOLOGY

## 2025-04-22 PROCEDURE — 72170 X-RAY EXAM OF PELVIS: CPT

## 2025-04-22 PROCEDURE — C1755 CATHETER, INTRASPINAL: HCPCS | Performed by: RADIOLOGY

## 2025-04-22 PROCEDURE — 160029 HCHG SURGERY MINUTES - 1ST 30 MINS LEVEL 4: Performed by: RADIOLOGY

## 2025-04-22 PROCEDURE — 160047 HCHG PACU  - EA ADDL 30 MINS PHASE II: Performed by: RADIOLOGY

## 2025-04-22 PROCEDURE — 160009 HCHG ANES TIME/MIN: Performed by: RADIOLOGY

## 2025-04-22 PROCEDURE — 700111 HCHG RX REV CODE 636 W/ 250 OVERRIDE (IP): Performed by: ANESTHESIOLOGY

## 2025-04-22 PROCEDURE — 77290 THER RAD SIMULAJ FIELD CPLX: CPT | Mod: 26 | Performed by: RADIOLOGY

## 2025-04-22 PROCEDURE — 77771 HDR RDNCL NTRSTL/ICAV BRCHTX: CPT | Performed by: RADIOLOGY

## 2025-04-22 PROCEDURE — 77295 3-D RADIOTHERAPY PLAN: CPT | Performed by: RADIOLOGY

## 2025-04-22 PROCEDURE — 160015 HCHG STAT PREOP MINUTES: Performed by: RADIOLOGY

## 2025-04-22 RX ORDER — IODIXANOL 270 MG/ML
INJECTION, SOLUTION INTRAVASCULAR
Status: DISCONTINUED | OUTPATIENT
Start: 2025-04-22 | End: 2025-04-22 | Stop reason: HOSPADM

## 2025-04-22 RX ORDER — HYDROMORPHONE HYDROCHLORIDE 1 MG/ML
0.2 INJECTION, SOLUTION INTRAMUSCULAR; INTRAVENOUS; SUBCUTANEOUS
Status: DISCONTINUED | OUTPATIENT
Start: 2025-04-22 | End: 2025-04-22 | Stop reason: HOSPADM

## 2025-04-22 RX ORDER — MIDAZOLAM HYDROCHLORIDE 1 MG/ML
INJECTION INTRAMUSCULAR; INTRAVENOUS PRN
Status: DISCONTINUED | OUTPATIENT
Start: 2025-04-22 | End: 2025-04-22 | Stop reason: SURG

## 2025-04-22 RX ORDER — HYDROMORPHONE HYDROCHLORIDE 1 MG/ML
1 INJECTION, SOLUTION INTRAMUSCULAR; INTRAVENOUS; SUBCUTANEOUS
Status: DISCONTINUED | OUTPATIENT
Start: 2025-04-22 | End: 2025-04-22 | Stop reason: HOSPADM

## 2025-04-22 RX ORDER — OXYCODONE HCL 5 MG/5 ML
10 SOLUTION, ORAL ORAL
Status: DISCONTINUED | OUTPATIENT
Start: 2025-04-22 | End: 2025-04-22 | Stop reason: HOSPADM

## 2025-04-22 RX ORDER — PHENYLEPHRINE HCL IN 0.9% NACL 1 MG/10 ML
SYRINGE (ML) INTRAVENOUS PRN
Status: DISCONTINUED | OUTPATIENT
Start: 2025-04-22 | End: 2025-04-22 | Stop reason: SURG

## 2025-04-22 RX ORDER — HYDROMORPHONE HYDROCHLORIDE 1 MG/ML
0.4 INJECTION, SOLUTION INTRAMUSCULAR; INTRAVENOUS; SUBCUTANEOUS
Status: DISCONTINUED | OUTPATIENT
Start: 2025-04-22 | End: 2025-04-22 | Stop reason: HOSPADM

## 2025-04-22 RX ORDER — HALOPERIDOL 5 MG/ML
1 INJECTION INTRAMUSCULAR
Status: DISCONTINUED | OUTPATIENT
Start: 2025-04-22 | End: 2025-04-22 | Stop reason: HOSPADM

## 2025-04-22 RX ORDER — IPRATROPIUM BROMIDE AND ALBUTEROL SULFATE 2.5; .5 MG/3ML; MG/3ML
3 SOLUTION RESPIRATORY (INHALATION)
Status: DISCONTINUED | OUTPATIENT
Start: 2025-04-22 | End: 2025-04-22 | Stop reason: HOSPADM

## 2025-04-22 RX ORDER — IODIXANOL 270 MG/ML
INJECTION, SOLUTION INTRAVASCULAR
Status: DISCONTINUED
Start: 2025-04-22 | End: 2025-04-22 | Stop reason: HOSPADM

## 2025-04-22 RX ORDER — ONDANSETRON 2 MG/ML
4 INJECTION INTRAMUSCULAR; INTRAVENOUS
Status: DISCONTINUED | OUTPATIENT
Start: 2025-04-22 | End: 2025-04-22 | Stop reason: HOSPADM

## 2025-04-22 RX ORDER — CEFAZOLIN SODIUM 1 G/3ML
INJECTION, POWDER, FOR SOLUTION INTRAMUSCULAR; INTRAVENOUS PRN
Status: DISCONTINUED | OUTPATIENT
Start: 2025-04-22 | End: 2025-04-22 | Stop reason: SURG

## 2025-04-22 RX ORDER — DIPHENHYDRAMINE HYDROCHLORIDE 50 MG/ML
12.5 INJECTION, SOLUTION INTRAMUSCULAR; INTRAVENOUS
Status: DISCONTINUED | OUTPATIENT
Start: 2025-04-22 | End: 2025-04-22 | Stop reason: HOSPADM

## 2025-04-22 RX ORDER — BUPIVACAINE HYDROCHLORIDE 5 MG/ML
INJECTION, SOLUTION EPIDURAL; INTRACAUDAL; PERINEURAL
Status: COMPLETED
Start: 2025-04-22 | End: 2025-04-22

## 2025-04-22 RX ORDER — LIDOCAINE HYDROCHLORIDE 20 MG/ML
INJECTION, SOLUTION EPIDURAL; INFILTRATION; INTRACAUDAL; PERINEURAL PRN
Status: DISCONTINUED | OUTPATIENT
Start: 2025-04-22 | End: 2025-04-22 | Stop reason: SURG

## 2025-04-22 RX ORDER — SODIUM CHLORIDE, SODIUM LACTATE, POTASSIUM CHLORIDE, CALCIUM CHLORIDE 600; 310; 30; 20 MG/100ML; MG/100ML; MG/100ML; MG/100ML
INJECTION, SOLUTION INTRAVENOUS CONTINUOUS
Status: ACTIVE | OUTPATIENT
Start: 2025-04-22 | End: 2025-04-22

## 2025-04-22 RX ORDER — MEPERIDINE HYDROCHLORIDE 25 MG/ML
12.5 INJECTION INTRAMUSCULAR; INTRAVENOUS; SUBCUTANEOUS
Status: DISCONTINUED | OUTPATIENT
Start: 2025-04-22 | End: 2025-04-22 | Stop reason: HOSPADM

## 2025-04-22 RX ORDER — DEXAMETHASONE SODIUM PHOSPHATE 4 MG/ML
INJECTION, SOLUTION INTRA-ARTICULAR; INTRALESIONAL; INTRAMUSCULAR; INTRAVENOUS; SOFT TISSUE PRN
Status: DISCONTINUED | OUTPATIENT
Start: 2025-04-22 | End: 2025-04-22 | Stop reason: SURG

## 2025-04-22 RX ORDER — CALCIUM CHLORIDE 100 MG/ML
INJECTION INTRAVENOUS; INTRAVENTRICULAR
Status: COMPLETED
Start: 2025-04-22 | End: 2025-04-22

## 2025-04-22 RX ORDER — OXYCODONE HCL 5 MG/5 ML
5 SOLUTION, ORAL ORAL
Status: DISCONTINUED | OUTPATIENT
Start: 2025-04-22 | End: 2025-04-22 | Stop reason: HOSPADM

## 2025-04-22 RX ORDER — MIDAZOLAM HYDROCHLORIDE 1 MG/ML
1 INJECTION INTRAMUSCULAR; INTRAVENOUS
Status: DISCONTINUED | OUTPATIENT
Start: 2025-04-22 | End: 2025-04-22 | Stop reason: HOSPADM

## 2025-04-22 RX ORDER — BUPIVACAINE HYDROCHLORIDE 5 MG/ML
INJECTION, SOLUTION EPIDURAL; INTRACAUDAL; PERINEURAL PRN
Status: DISCONTINUED | OUTPATIENT
Start: 2025-04-22 | End: 2025-04-22 | Stop reason: SURG

## 2025-04-22 RX ORDER — CALCIUM CHLORIDE 100 MG/ML
INJECTION INTRAVENOUS; INTRAVENTRICULAR PRN
Status: DISCONTINUED | OUTPATIENT
Start: 2025-04-22 | End: 2025-04-22 | Stop reason: SURG

## 2025-04-22 RX ADMIN — SODIUM CHLORIDE, POTASSIUM CHLORIDE, SODIUM LACTATE AND CALCIUM CHLORIDE: 600; 310; 30; 20 INJECTION, SOLUTION INTRAVENOUS at 07:33

## 2025-04-22 RX ADMIN — FENTANYL CITRATE 25 MCG: 50 INJECTION, SOLUTION INTRAMUSCULAR; INTRAVENOUS at 07:50

## 2025-04-22 RX ADMIN — PROPOFOL 20 MG: 10 INJECTION, EMULSION INTRAVENOUS at 07:53

## 2025-04-22 RX ADMIN — CEFAZOLIN 2 G: 1 INJECTION, POWDER, FOR SOLUTION INTRAMUSCULAR; INTRAVENOUS at 07:45

## 2025-04-22 RX ADMIN — CALCIUM CHLORIDE 500 MG: 100 INJECTION, SOLUTION INTRAVENOUS; INTRAVENTRICULAR at 08:13

## 2025-04-22 RX ADMIN — DEXAMETHASONE SODIUM PHOSPHATE 8 MG: 4 INJECTION INTRA-ARTICULAR; INTRALESIONAL; INTRAMUSCULAR; INTRAVENOUS; SOFT TISSUE at 07:53

## 2025-04-22 RX ADMIN — PROPOFOL 20 MG: 10 INJECTION, EMULSION INTRAVENOUS at 07:56

## 2025-04-22 RX ADMIN — PROPOFOL 20 MG: 10 INJECTION, EMULSION INTRAVENOUS at 08:00

## 2025-04-22 RX ADMIN — LIDOCAINE HYDROCHLORIDE 30 MG: 20 INJECTION, SOLUTION EPIDURAL; INFILTRATION; INTRACAUDAL; PERINEURAL at 07:53

## 2025-04-22 RX ADMIN — MIDAZOLAM HYDROCHLORIDE 2 MG: 1 INJECTION, SOLUTION INTRAMUSCULAR; INTRAVENOUS at 07:45

## 2025-04-22 RX ADMIN — Medication 100 MCG: at 08:06

## 2025-04-22 RX ADMIN — BUPIVACAINE HYDROCHLORIDE 2 ML: 5 INJECTION, SOLUTION EPIDURAL; INTRACAUDAL at 07:50

## 2025-04-22 ASSESSMENT — PAIN DESCRIPTION - PAIN TYPE
TYPE: SURGICAL PAIN

## 2025-04-22 ASSESSMENT — PAIN SCALES - GENERAL: PAIN_LEVEL: 0

## 2025-04-22 ASSESSMENT — FIBROSIS 4 INDEX
FIB4 SCORE: 5.17
FIB4 SCORE: 5.17

## 2025-04-22 NOTE — DISCHARGE INSTRUCTIONS
If any questions arise, call your provider.  If your provider is not available, please feel free to call the Surgical Center at [phone].    MEDICATIONS: Resume taking daily medication.  Take prescribed pain medication with food.  If no medication is prescribed, you may take non-aspirin pain medication if needed.  PAIN MEDICATION CAN BE VERY CONSTIPATING.  Take a stool softener or laxative such as senokot, pericolace, or milk of magnesia if needed.    Last pain medication given:        What to Expect Post Anesthesia    Rest and take it easy for the first 24 hours.  A responsible adult is recommended to remain with you during that time.  It is normal to feel sleepy.  We encourage you to not do anything that requires balance, judgment or coordination.    FOR 24 HOURS DO NOT:  Drive, operate machinery or run household appliances.  Drink beer or alcoholic beverages.  Make important decisions or sign legal documents.    To avoid nausea, slowly advance diet as tolerated, avoiding spicy or greasy foods for the first day.  Add more substantial food to your diet according to your provider's instructions.  Babies can be fed formula or breast milk as soon as they are hungry.  INCREASE FLUIDS AND FIBER TO AVOID CONSTIPATION.    MILD FLU-LIKE SYMPTOMS ARE NORMAL.  YOU MAY EXPERIENCE GENERALIZED MUSCLE ACHES, THROAT IRRITATION, HEADACHE AND/OR SOME NAUSEA.

## 2025-04-22 NOTE — PROCEDURES
Date of Service:  04/22/25    Referring Physician: No ref. 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 on 2/14/2025  Histopathologic type: Small cell carcinoma, NOS  Stage prefix: Initial diagnosis  Para-aortic status: Positive  Para-aortic zackary method of assessment: PET  Histologic grade (G): G3  Histologic grading system: 3 grade system  P16 overexpression: Positive       PRESCRIPTION:     Radiation Therapy Episodes       Active Episodes       Radiation Therapy: IMRT (3/3/2025)                   Radiation Treatments         Plan Last Treated On Elapsed Days Fractions Treated Prescribed Fraction Dose (cGy) Prescribed Total Dose (cGy)    HDR2 4/10/2025 38 @ 04/10/2025 1 of 1 600 600    HDR3 4/15/2025 43 @ 04/15/2025 1 of 1 600 600    HDR4 4/17/2025 45 @ 04/17/2025 1 of 1 600 600    HDR5 4/22/2025 50 @ 04/22/2025 1 of 1 600 600    HDR_1 4/8/2025 36 @ 04/08/2025 1 of 1 600 600    Pelvis 4/21/2025 49 @ 04/21/2025 24 of 25 240 6,000                  Reference Point Last Treated On Elapsed Days Most Recent Session Dose (cGy) Total Dose (cGy)    Fx4 Pt1 4/17/2025 45 @ 04/17/2025 526 526    Fx4 Pt2 4/17/2025 45 @ 04/17/2025 549 549    Fx4 Pt3 4/17/2025 45 @ 04/17/2025 431 431    Fx5 Pt1 4/22/2025 50 @ 04/22/2025 590 590    Fx5 Pt2 4/22/2025 50 @ 04/22/2025 604 604    Fx5 Pt3 4/22/2025 50 @ 04/22/2025 586 586    HDR 4/22/2025 50 @ 04/22/2025 600 3,000    HDR Pt1 4/10/2025 38 @ 04/10/2025 601 601    HDR Pt2 4/10/2025 38 @ 04/10/2025 622 622    HDR Pt3 4/10/2025 38 @ 04/10/2025 615 615    Pelvis 4/21/2025 49 @ 04/21/2025 240 5,760    fx1 pt1 4/8/2025 36 @ 04/08/2025 602 602    fx1 pt2 4/8/2025 36 @ 04/08/2025 592 592    fx1 pt3 4/8/2025 36 @ 04/08/2025 606 606    fx1 pt4 4/8/2025 36 @ 04/08/2025 603 603    fx3 pt1 4/15/2025 43 @  04/15/2025 597 597    fx3 pt2 4/15/2025 43 @ 04/15/2025 589 589    fx3 pt3 4/15/2025 43 @ 04/15/2025 612 612                            TX NUMBER: 5    MARKER: [] Titanium   [x] Gold    SIMULATION GYN HDR BRACHYTHERAPY:    Patient had CT in department after placement of Gyn brachytherapy device in the OR. Please see separate OP note. Position verified and images transferred to [facility].    Plan generated and approved.    In Brachy suite HDR device was attached to the HDR unit via transfer catheters. The setup was verified by myself and physicist and treatment delivered.    I have personally reviewed the relevant data, performed the target localization, and determined all relevant factors for this patient’s simulation.

## 2025-04-22 NOTE — ADDENDUM NOTE
Encounter addended by: Ilda JEAN BAPTISTE M.D. on: 4/22/2025 12:41 PM   Actions taken: Clinical Note Signed

## 2025-04-22 NOTE — ANESTHESIA PROCEDURE NOTES
Spinal Block    Date/Time: 4/22/2025 7:46 AM    Performed by: Moises Mckee III, M.D.  Authorized by: Moises Mckee III, M.D.    Patient Location:  OR  Start Time:  4/22/2025 7:46 AM  End Time:  4/22/2025 7:50 AM  Reason for Block: primary anesthetic    patient identified, IV checked, site marked, risks and benefits discussed, surgical consent, monitors and equipment checked, pre-op evaluation and timeout performed    Patient Position:  Sitting  Prep: ChloraPrep, patient draped and sterile technique    Monitoring:  Blood pressure, continuous pulse oximetry and heart rate  Approach:  Midline  Location:  L2-3  Injection Technique:  Single-shot  Skin infiltration:  Lidocaine  Strength:  1%  Dose:  2ml  Needle Type:  Pencan  Needle Gauge:  25 G  CSF flowing pre/post injection:  Yes  Sensory Level:  T10

## 2025-04-22 NOTE — ANESTHESIA POSTPROCEDURE EVALUATION
Patient: Diane Barrett    Procedure Summary       Date: 04/22/25 Room / Location: [facility] ROOM 21 / SURGERY SAME DAY [facility]    Anesthesia Start: 0733 Anesthesia Stop: 0814    Procedure: HIGH DOSE GYNECOLOGICAL BRACHYTHERAPY - INTERSTITIAL (Vagina ) Diagnosis: (CERVICAL CANCER)    Surgeons: Ilda JEAN BAPTISTE M.D. Responsible Provider: Moises Mckee III, M.D.    Anesthesia Type: spinal ASA Status: 3            Final Anesthesia Type: spinal  Last vitals  BP   Blood Pressure : 100/52 (recheck with radiation RN)    Temp   36.4 °C (97.5 °F)    Pulse   84   Resp   (!) 35    SpO2   97 %      Anesthesia Post Evaluation    Patient location during evaluation: PACU  Patient participation: complete - patient participated  Level of consciousness: awake and alert  Pain score: 0    Airway patency: patent  Anesthetic complications: no  Cardiovascular status: hemodynamically stable  Respiratory status: acceptable  Hydration status: euvolemic    PONV: none          No notable events documented.     Nurse Pain Score: 0 (NPRS)

## 2025-04-22 NOTE — OR NURSING
0953- Pt returning to PACU, VSS on RA, attached to monitor. Report from radiation RN, no reports of pain or nausea. Pt has sensation from below the knee and is able to wiggle toes slightly.     1003 Patient tolerating popsicle.     1200- Pt has been resting, VSS on RA, no pain or nausea, motor function and sensation returning to lower legs.     1212- pt assisted up to the bathroom    1220- Able to void, dressed, instructions reviewed with pt and acknowledged.     1228- Port deaccessed per protocol, dressing placed. pt escorted off the unit in wheelchair by CCT, VSS on RA, all belongings sent with pt.

## 2025-04-22 NOTE — OR NURSING
0811 patient arrival from OR: patient attached to monitor and VSS with patient on 2L O2 via nasal cannula. Brachytherapy device visualized without drainage present and weir in place and currently clamped. Patient with able to slightly wiggle right toes but denies sensation at this time.     See flow sheets for vitals and interventions.     0813 Dr. Mckee gave calcium chloride for BP.     0818 Radiation team at bedside. Report given and all questions answered at this time.     0819 patient off unit in [facility] with radiation team.

## 2025-04-22 NOTE — PROGRESS NOTES
0817 Arrived bedside to PACU, report received. Patient alert and oriented, slightly drowsy. Slight movement to toes on right foot, otherwise denying sensation to BLE after spinal. Kevin catheter clamped. Port accessed infusing LR TKO. No pain of bleeding noted around intravaginal device. VSS /52, HR 88, O2 97% wearing 2 L NC, T 97.58F.      0825 Patient transported to [facility] by RN and therapy staff. BP 94/55, HR 92, O2 96% 2L, T 97.2F. Resting comfortably.     0845 Imaging completed in SIM, now in room 7 with SCD's on and running. Call light in reach. /59, HR 77, O2 98% 2L, T 97.0.     0900 Patient A&O x 4 resting comfortably. No reports of pain, no bleeding around intravaginal device. /59, HR 87, O2 97% 2L, T96.6F. Patient reminded no to move BLE with intravaginal device in place.     0915 Patient to treatment vault 121/60, HR 87, O2 94% room air, T 97.0F. Continuing to deny pain or sensation to BLE.     0917 Treatment start    0928 Treatment ongoing. /64, HR 80, O2 98% room air.    0930 Treatment end. Kevin unclamped by Dr. Diaz, intravaginal device pulled and well tolerated by patient. No bleeding noted after removal of device.     0935 /69, HR 77, O2 93% room air, T 97.2F. No reports of pain or sensation, slight movement again noted to toes on right foot. 300mL sia urine in collection bag before discontinued.     0945 Vitals obtained before transport to [facility], call to notify of arrival. /71, HR 76, O2 96 room air.     0950 Arrival to PACU bedside report to [employer] [facility]. PET scan appointment information, follow up as well as discharge instructions left with RN.

## 2025-04-22 NOTE — ANESTHESIA TIME REPORT
Anesthesia Start and Stop Event Times       Date Time Event    4/22/2025 0714 Ready for Procedure     0733 Anesthesia Start     0814 Anesthesia Stop          Responsible Staff  04/22/25      Name Role Begin End    Moises cMkee III, M.D. Anesth 0733 0814          Overtime Reason:  no overtime (within assigned shift)    Comments:

## 2025-04-22 NOTE — OP REPORT
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 on 2/14/2025  Histopathologic type: Small cell carcinoma, NOS  Stage prefix: Initial diagnosis  Para-aortic status: Positive  Para-aortic zackary method of assessment: PET  Histologic grade (G): G3  Histologic grading system: 3 grade system  P16 overexpression: Positive      POSTOPERATIVE DIAGNOSIS: Same    PRIMARY SURGEON: Ilda Diaz MD     PROCEDURE: # 5, 15 degree tandem and small ovoids insertion for brachytherapy.     ANESTHESIOLOGIST: Moises Mckee    TYPE OF ANESTHESIA: Spinal     ESTIMATED BLOOD LOSS: 0 cc    DESCRIPTION OF OPERATIVE NOTE: Patient was prepped and draped in a sterile fashion and a Kevin catheter was inserted.  8 cm smitt sleeve previously inserted found.  Next,a 15-degree tandem and small ovoids were inserted with careful visualization of the anatomy. Once appropriate positioning was verified using fluoroscopy, then vaginal packing was utilized to both hold the device in place  and maximize displacement of the  surrounding normal structures. Patient transferred to [facility]. Stabilizer board used to hold device in place.     DISPOSITION: Patient was released to recovery in good condition and will be sent to Radiation Oncology for CT planning and radiation delivery.       ___________________________________   Ilda Diaz MD

## 2025-04-22 NOTE — ADDENDUM NOTE
Encounter addended by: Latricia Shah R.N. on: 4/22/2025 10:51 AM   Actions taken: Clinical Note Signed

## 2025-04-22 NOTE — ANESTHESIA PREPROCEDURE EVALUATION
Case: [id number] Date/Time: 04/22/25 0715    Procedure: HIGH DOSE GYNECOLOGICAL BRACHYTHERAPY - INTERSTITIAL    Pre-op diagnosis: CERVICAL CANCER    Location: [facility] ROOM 21 / SURGERY SAME DAY [facility]    Surgeons: Ilda JEAN BAPTISTE M.D.            Relevant Problems   CARDIAC   (positive) Vascular injury      GI   (positive) Peptic ulcer disease       Physical Exam    Airway   Mallampati: III  TM distance: >3 FB  Neck ROM: limited       Cardiovascular - normal exam  Rhythm: regular  Rate: normal  (-) murmur     Dental - normal exam           Pulmonary - normal exam  Breath sounds clear to auscultation     Abdominal   (+) obese     Neurological - normal exam                   Anesthesia Plan    ASA 3       Plan - spinal   Neuraxial block will be primary anesthetic            Induction: intravenous    Postoperative Plan: Postoperative administration of opioids is intended.    Pertinent diagnostic labs and testing reviewed    Informed Consent:    Anesthetic plan and risks discussed with patient.    Use of blood products discussed with: patient whom consented to blood products.

## 2025-04-23 ENCOUNTER — HOSPITAL ENCOUNTER (OUTPATIENT)
Dept: LAB | Facility: MEDICAL CENTER | Age: 70
End: 2025-04-23
Attending: RADIOLOGY
Payer: MEDICARE

## 2025-04-23 ENCOUNTER — HOSPITAL ENCOUNTER (OUTPATIENT)
Dept: RADIATION ONCOLOGY | Facility: MEDICAL CENTER | Age: 70
End: 2025-04-23
Attending: RADIOLOGY
Payer: MEDICARE

## 2025-04-23 ENCOUNTER — HOSPITAL ENCOUNTER (OUTPATIENT)
Dept: RADIATION ONCOLOGY | Facility: MEDICAL CENTER | Age: 70
End: 2025-04-23
Payer: MEDICARE

## 2025-04-23 VITALS
HEART RATE: 120 BPM | BODY MASS INDEX: 36.28 KG/M2 | DIASTOLIC BLOOD PRESSURE: 92 MMHG | OXYGEN SATURATION: 95 % | WEIGHT: 192 LBS | SYSTOLIC BLOOD PRESSURE: 160 MMHG | TEMPERATURE: 97.3 F

## 2025-04-23 LAB
CHEMOTHERAPY INFUSION START DATE: NORMAL
CHEMOTHERAPY RECORDS: 2.4 GY
CHEMOTHERAPY RECORDS: 6000 CGY
CHEMOTHERAPY RECORDS: NORMAL
CHEMOTHERAPY RX CANCER: NORMAL
DATE 1ST CHEMO CANCER: NORMAL
RAD ONC ARIA COURSE LAST TREATMENT DATE: NORMAL
RAD ONC ARIA COURSE TREATMENT ELAPSED DAYS: NORMAL
RAD ONC ARIA REFERENCE POINT DOSAGE GIVEN TO DATE: 60 GY
RAD ONC ARIA REFERENCE POINT ID: NORMAL
RAD ONC ARIA REFERENCE POINT SESSION DOSAGE GIVEN: 2.4 GY

## 2025-04-23 PROCEDURE — 36415 COLL VENOUS BLD VENIPUNCTURE: CPT

## 2025-04-23 PROCEDURE — 77386 HCHG IMRT DELIVERY COMPLEX: CPT | Performed by: RADIOLOGY

## 2025-04-23 PROCEDURE — 77427 RADIATION TX MANAGEMENT X5: CPT | Performed by: RADIOLOGY

## 2025-04-23 PROCEDURE — 77014 PR CT GUIDANCE PLACEMENT RAD THERAPY FIELDS: CPT | Mod: 26 | Performed by: RADIOLOGY

## 2025-04-23 ASSESSMENT — FIBROSIS 4 INDEX: FIB4 SCORE: 5.17

## 2025-04-23 ASSESSMENT — PAIN SCALES - GENERAL: PAINLEVEL_OUTOF10: NO PAIN

## 2025-04-23 NOTE — ON TREATMENT VISIT
ON TREATMENT  NOTE  RADIATION ONCOLOGY DEPARTMENT    Patient name:  Diane Barrett    Primary Physician:  Brendan Cho M.D. MRN: [id number]  CSN: [id number]   Referring physician:  Ambrose Silverman M.D.   : 1955, 69 y.o.     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 on 2025  Histopathologic type: Small cell carcinoma, NOS  Stage prefix: Initial diagnosis  Para-aortic status: Positive  Para-aortic zackary method of assessment: PET  Histologic grade (G): G3  Histologic grading system: 3 grade system  P16 overexpression: Positive      TREATMENT SUMMARY:  Course First Treatment Date 2025  Course Last Treatment Date 2025  Radiation Therapy Episodes       Active Episodes       Radiation Therapy: IMRT (3/3/2025)                   Radiation Treatments         Plan Last Treated On Elapsed Days Fractions Treated Prescribed Fraction Dose (cGy) Prescribed Total Dose (cGy)    HDR2 4/10/2025 38 @ 04/10/2025 1 of 1 600 600    HDR3 4/15/2025 43 @ 04/15/2025 1 of 1 600 600    HDR4 2025 45 @ 2025 1 of 1 600 600    HDR5 2025 50 @ 2025 1 of 1 600 600    HDR_1 2025 36 @ 2025 1 of 1 600 600    Pelvis 2025 51 @ 2025 25 of 25 240 6,000                  Reference Point Last Treated On Elapsed Days Most Recent Session Dose (cGy) Total Dose (cGy)    Fx4 Pt1 2025 45 @ 2025 526 526    Fx4 Pt2 2025 45 @ 2025 549 549    Fx4 Pt3 2025 45 @ 2025 431 431    Fx5 Pt1 2025 50 @ 2025 590 590    Fx5 Pt2 2025 50 @ 2025 604 604    Fx5 Pt3 2025 50 @ 2025 586 586    HDR 2025 50 @ 2025 600 3,000    HDR Pt1 4/10/2025 38 @ 04/10/2025 601 601    HDR Pt2 4/10/2025 38 @ 04/10/2025 622 622    HDR Pt3 4/10/2025 38 @  04/10/2025 615 615    Pelvis 4/23/2025 51 @ 04/23/2025 240 6,000    fx1 pt1 4/8/2025 36 @ 04/08/2025 602 602    fx1 pt2 4/8/2025 36 @ 04/08/2025 592 592    fx1 pt3 4/8/2025 36 @ 04/08/2025 606 606    fx1 pt4 4/8/2025 36 @ 04/08/2025 603 603    fx3 pt1 4/15/2025 43 @ 04/15/2025 597 597    fx3 pt2 4/15/2025 43 @ 04/15/2025 589 589    fx3 pt3 4/15/2025 43 @ 04/15/2025 612 612                               SUBJECTIVE:  Tongue treatments well.    VITAL SIGNS:      4/23/2025     9:27 AM 4/22/2025    12:26 PM 4/22/2025    12:00 PM 4/22/2025    11:30 AM 4/22/2025    11:00 AM 4/22/2025    10:30 AM 4/22/2025    10:15 AM   Vitals   SYSTOLIC 160 153 170 149 153 148 156   DIASTOLIC 92 73 81 75 72 71 81   Pulse 120 105 99 92 76 85 90   Temperature 36.3 °C (97.3 °F)         Respiration  18 18 16 18 16 18   Weight 192         BMI 36.28 kg/m2         Pulse Oximetry 95 % 94 % 96 % 92 % 92 % 95 % 96 %     KPS: 90, Able to carry on normal activity; minor signs or symptoms of disease (ECOG equivalent 0)  Encounter Vitals  Temperature: 36.3 °C (97.3 °F)  Temp src: Temporal  Blood Pressure : (!) 160/92  Pulse: (!) 120  Pulse Oximetry: 95 %  Weight: 87.1 kg (192 lb)  Pain Score: No pain      4/23/2025     9:27 AM 4/16/2025     2:17 PM 4/9/2025     2:56 PM 3/26/2025    10:06 AM 3/19/2025     9:13 AM 3/12/2025    10:31 AM   Pain Assessment   Pain Score NO PAIN NO PAIN NO PAIN NO PAIN NO PAIN NO PAIN          PHYSICAL EXAM:  Physical Exam  Vitals and nursing note reviewed.   Constitutional:       Appearance: She is well-developed.   HENT:      Head: Normocephalic.   Skin:     General: Skin is warm and dry.      Findings: No erythema.   Neurological:      Mental Status: She is alert and oriented to person, place, and time.   Psychiatric:         Behavior: Behavior normal.         Thought Content: Thought content normal.         Judgment: Judgment normal.              4/23/2025     9:29 AM 4/16/2025     2:17 PM 4/9/2025     2:58 PM 3/26/2025     10:11 AM 3/19/2025     9:17 AM 3/12/2025    10:33 AM 3/5/2025     9:18 AM   Toxicity Assessment   Toxicity Assessment Female Pelvis Female Pelvis Female Pelvis Female Pelvis Female Pelvis Female Pelvis Female Pelvis   Fatigue (lethargy, malaise, asthenia) Severe (e.g., decrease in performance status by 2 or more ECOG levels or 40% [place]) or loss of ability to perform some activities Severe (e.g., decrease in performance status by 2 or more ECOG levels or 40% [place]sky) or loss of ability to perform some activities Severe (e.g., decrease in performance status by 2 or more ECOG levels or 40% Karn[place]sky) or loss of ability to perform some activities Moderate (e.g., decrease in performance status by 1 ECOG level or 20% [place]sky) or causing difficulty performing some activities Moderate (e.g., decrease in performance status by 1 ECOG level or 20% [place]) or causing difficulty performing some activities Increased fatigue over baseline, but not altering normal activities None   Radiation Dermatitis Faint erythema or dry desquamation None None None None None None   Anorexia Oral intake significantly decreased Oral intake significantly decreased Loss of appetite Oral intake significantly decreased Loss of appetite Oral intake significantly decreased Oral intake significantly decreased   Colitis None None None None None Abdominal pain with mucus and/or blood in stool None   Constipation None None None None None None None   Dehydration Dry mucous membranes and/or diminished skin turgor Dry mucous membranes and/or diminished skin turgor Dry mucous membranes and/or diminished skin turgor None None Dry mucous membranes and/or diminished skin turgor None   Diarrhea w/o Colostomy Increase of <4 stools/day over pre-treatment Increase of <4 stools/day over pre-treatment None Increase of <4 stools/day over pre-treatment None Increase of <4 stools/day over pre-treatment None   Flatulence None None None None Mild None None    Nausea Oral intake significantly decreased Oral intake significantly decreased Oral intake significantly decreased Oral intake significantly decreased Able to eat Oral intake significantly decreased Oral intake significantly decreased   Proctitis None None None None None Increased stool frequency, occasional blood-streaked stools or rectal discomfort (including hemorrhoids) not requiring medication None   Vomiting None None None None None None None   RT - Pain due to RT None None None None None None None   Tumor Pain (onset or exacerbation of tumor pain due to treatment) None None None None None None None   Dysuria (painful urination) None None None None None None None   Urinary Frequency Normal Normal Normal Increase in frequency up to 2x normal Normal Normal Normal   Urinary Urgency None None None Present None None None   Bladder Spasms Absent Absent Absent Absent Absent Absent Absent   Incontinence None None None None None None None   Urinary Retention Normal Normal Normal Normal Normal Normal Normal   Vaginitis (not due to infection) None None None None None None None   Vaginal Bleeding Spotting, requiring <2 pads per day Spotting, requiring <2 pads per day Spotting, requiring <2 pads per day None None None None   Vaginal Dryness Normal Normal Normal Normal Normal Normal Normal       CURRENT MEDICATIONS:    Current Outpatient Medications:     loperamide (IMODIUM) 2 MG Cap, Take 2 mg by mouth 4 times a day as needed for Diarrhea., Disp: , Rfl:     MAGNESIUM PO, Take 1 Tablet by mouth every day., Disp: , Rfl:     Cholecalciferol (VITAMIN D-3 PO), Take 1 Tablet by mouth every day., Disp: , Rfl:     ondansetron (ZOFRAN ODT) 4 MG TABLET DISPERSIBLE, Take 4 mg by mouth every 6 hours as needed for Nausea/Vomiting., Disp: , Rfl:     LABORATORY DATA:   Lab Results   Component Value Date/Time    SODIUM 140 04/02/2025 04:10 AM    POTASSIUM 3.7 04/02/2025 04:10 AM    CHLORIDE 104 04/02/2025 04:10 AM    CO2 25 04/02/2025  04:10 AM    GLUCOSE 127 (H) 04/02/2025 04:10 AM    BUN 15 04/02/2025 04:10 AM    CREATININE 1.10 04/02/2025 04:10 AM       Lab Results   Component Value Date/Time    WBC 2.9 (L) 04/21/2025 01:50 PM    RBC 3.45 (L) 04/21/2025 01:50 PM    HEMOGLOBIN 9.9 (L) 04/21/2025 01:50 PM    HEMATOCRIT 29.9 (L) 04/21/2025 01:50 PM    MCV 86.7 04/21/2025 01:50 PM    MCH 28.7 04/21/2025 01:50 PM    MCHC 33.1 04/21/2025 01:50 PM    PLATELETCT 80 (L) 04/21/2025 01:50 PM         RADIOLOGY DATA:  DX-PELVIS-1 OR 2 VIEWS  Result Date: 4/22/2025  Digitized intraoperative radiograph is submitted for review. This examination is not for diagnostic purpose but for guidance during a surgical procedure. Please see the patient's chart for full procedural details. INTERPRETING LOCATION: [address] [facility] [place], [address]    DX-PELVIS-1 OR 2 VIEWS  Result Date: 4/17/2025  Digitized intraoperative radiograph is submitted for review. This examination is not for diagnostic purpose but for guidance during a surgical procedure. Please see the patient's chart for full procedural details. INTERPRETING LOCATION: [address] [place], [address]    DX-PELVIS-1 OR 2 VIEWS  Result Date: 4/15/2025  Digitized intraoperative radiograph is submitted for review. This examination is not for diagnostic purpose but for guidance during a surgical procedure. Please see the patient's chart for full procedural details. INTERPRETING LOCATION: [address] [place], [address]    DX-PELVIS-1 OR 2 VIEWS  Result Date: 4/10/2025  Digitized intraoperative radiograph is submitted for review. This examination is not for diagnostic purpose but for guidance during a surgical procedure. Please see the patient's chart for full procedural details. INTERPRETING LOCATION: [address] , [place], [address]    DX-PELVIS-1 OR 2 VIEWS  Result Date: 4/8/2025  Digitized intraoperative radiograph is submitted for review. This examination is not for diagnostic purpose but for guidance during a surgical  procedure. Please see the patient's chart for full procedural details. INTERPRETING LOCATION: [address], [place], [address]    MR-PELVIS-WITH & W/O AND SEQUENCES  Result Date: 4/1/2025  1.  Stable appearance of the cervix with hypoattenuation anteriorly, unchanged from prior, likely posttreatment change. 2.  Stable presumed LEFT iliac lymph node showing only capsular enhancement, likely treated disease. 3.  No new adenopathy.    DX-PORTABLE FLUORO > 1 HOUR  Result Date: 4/22/2025  Portable fluoroscopy utilized for 1 second. INTERPRETING LOCATION: [address], [place], [address]    DX-PORTABLE FLUORO > 1 HOUR  Result Date: 4/15/2025  Portable fluoroscopy utilized for 2 seconds. INTERPRETING LOCATION: [address], [place], [address]    DX-PORTABLE FLUORO > 1 HOUR  Result Date: 4/10/2025  Portable fluoroscopy utilized for 1 second. INTERPRETING LOCATION: [address], [place], [address]    DX-PORTABLE FLUORO > 1 HOUR  Result Date: 4/8/2025  Portable fluoroscopy utilized for 3 seconds. INTERPRETING LOCATION: [address], [place], [address]    DX-PORTABLE FLUOROSCOPY < 1 HOUR Reason For Exam: brachytherapy  Result Date: 4/17/2025  Portable fluoroscopy utilized for 2 seconds. 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 on 2/14/2025      PLAN:  No change in treatment plan  Signatera today.  PET/CT scheduled for 12 weeks with follow-up.    Disposition:  Treatment plan and imaging reviewed. Questions answered. Continue therapy outlined.     Ilda JEAN BAPTISTE M.D.    No orders of the defined types were placed in this encounter.

## 2025-04-24 NOTE — RADIATION COMPLETION NOTES
END OF TREATMENT SUMMARY    Patient name:  Diane Barrett    Primary Physician:  Brendan Cho M.D. MRN: [id number]  CSN: [id number]   Referring physician:  No ref. provider found  : 1955, 69 y.o.       TREATMENT SUMMARY:        Course First Treatment Date 2025    Course Last Treatment Date 2025   Course Elapsed Days 51 @ 2025   Course Intent Curative     Radiation Therapy Episodes       Active Episodes       Radiation Therapy: IMRT (3/3/2025)                   Radiation Treatments         Plan Last Treated On Elapsed Days Fractions Treated Prescribed Fraction Dose (cGy) Prescribed Total Dose (cGy)    HDR2 4/10/2025 38 @ 04/10/2025 1 of 1 600 600    HDR3 4/15/2025 43 @ 04/15/2025 1 of 1 600 600    HDR4 2025 45 @ 2025 1 of 1 600 600    HDR5 2025 50 @ 2025 1 of 1 600 600    HDR_1 2025 36 @ 2025 1 of 1 600 600    Pelvis 2025 51 @ 2025 25 of 25 240 6,000                  Reference Point Last Treated On Elapsed Days Most Recent Session Dose (cGy) Total Dose (cGy)    Fx4 Pt1 2025 45 @ 2025 526 526    Fx4 Pt2 2025 45 @ 2025 549 549    Fx4 Pt3 2025 45 @ 2025 431 431    Fx5 Pt1 2025 50 @ 2025 590 590    Fx5 Pt2 2025 50 @ 2025 604 604    Fx5 Pt3 2025 50 @ 2025 586 586    HDR 2025 50 @ 2025 600 3,000    HDR Pt1 4/10/2025 38 @ 04/10/2025 601 601    HDR Pt2 4/10/2025 38 @ 04/10/2025 622 622    HDR Pt3 4/10/2025 38 @ 04/10/2025 615 615    Pelvis 2025 51 @ 2025 240 6,000    fx1 pt1 2025 36 @ 2025 602 602    fx1 pt2 2025 36 @ 2025 592 592    fx1 pt3 2025 36 @ 2025 606 606    fx1 pt4 2025 36 @ 2025 603 603    fx3 pt1 4/15/2025 43 @ 04/15/2025 597 597    fx3 pt2 4/15/2025 43 @ 04/15/2025 589 589    fx3 pt3 4/15/2025 43 @ 04/15/2025 612 612                                     STAGE:   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 on 2/14/2025  Histopathologic type: Small cell carcinoma, NOS  Stage prefix: Initial diagnosis  Para-aortic status: Positive  Para-aortic zackary method of assessment: PET  Histologic grade (G): G3  Histologic grading system: 3 grade system  P16 overexpression: Positive       TREATMENT INDICATION:   ***     CONCURRENT SYSTEMIC TREATMENT:   ***     RT COURSE DISCONTINUED EARLY:   No     PATIENT EXPERIENCE:       4/23/2025     9:29 AM 4/16/2025     2:17 PM 4/9/2025     2:58 PM 3/26/2025    10:11 AM 3/19/2025     9:17 AM 3/12/2025    10:33 AM 3/5/2025     9:18 AM   Toxicity Assessment   Toxicity Assessment Female Pelvis Female Pelvis Female Pelvis Female Pelvis Female Pelvis Female Pelvis Female Pelvis   Fatigue (lethargy, malaise, asthenia) Severe (e.g., decrease in performance status by 2 or more ECOG levels or 40% [place]) or loss of ability to perform some activities Severe (e.g., decrease in performance status by 2 or more ECOG levels or 40% [place]) or loss of ability to perform some activities Severe (e.g., decrease in performance status by 2 or more ECOG levels or 40% [place]) or loss of ability to perform some activities Moderate (e.g., decrease in performance status by 1 ECOG level or 20% [place]) or causing difficulty performing some activities Moderate (e.g., decrease in performance status by 1 ECOG level or 20% [place]) or causing difficulty performing some activities Increased fatigue over baseline, but not altering normal activities None   Radiation Dermatitis Faint erythema or dry desquamation None None None None None None   Anorexia Oral intake significantly decreased Oral intake significantly decreased Loss of appetite Oral intake significantly decreased Loss of appetite Oral intake significantly decreased Oral intake significantly  decreased   Colitis None None None None None Abdominal pain with mucus and/or blood in stool None   Constipation None None None None None None None   Dehydration Dry mucous membranes and/or diminished skin turgor Dry mucous membranes and/or diminished skin turgor Dry mucous membranes and/or diminished skin turgor None None Dry mucous membranes and/or diminished skin turgor None   Diarrhea w/o Colostomy Increase of <4 stools/day over pre-treatment Increase of <4 stools/day over pre-treatment None Increase of <4 stools/day over pre-treatment None Increase of <4 stools/day over pre-treatment None   Flatulence None None None None Mild None None   Nausea Oral intake significantly decreased Oral intake significantly decreased Oral intake significantly decreased Oral intake significantly decreased Able to eat Oral intake significantly decreased Oral intake significantly decreased   Proctitis None None None None None Increased stool frequency, occasional blood-streaked stools or rectal discomfort (including hemorrhoids) not requiring medication None   Vomiting None None None None None None None   RT - Pain due to RT None None None None None None None   Tumor Pain (onset or exacerbation of tumor pain due to treatment) None None None None None None None   Dysuria (painful urination) None None None None None None None   Urinary Frequency Normal Normal Normal Increase in frequency up to 2x normal Normal Normal Normal   Urinary Urgency None None None Present None None None   Bladder Spasms Absent Absent Absent Absent Absent Absent Absent   Incontinence None None None None None None None   Urinary Retention Normal Normal Normal Normal Normal Normal Normal   Vaginitis (not due to infection) None None None None None None None   Vaginal Bleeding Spotting, requiring <2 pads per day Spotting, requiring <2 pads per day Spotting, requiring <2 pads per day None None None None   Vaginal Dryness Normal Normal Normal Normal Normal Normal  "Normal        FOLLOW-UP PLAN:   {avpfollowuplist:39401::\"8 Weeks\"}     COMMENT:          ANATOMIC TARGET SUMMARY    ANATOMIC TARGET MODALITY TECHNIQUE   ***   {RAD ONC MODALITY:72860} {RAD ONC Technique:90030::\"IMRT\"}            COMMENT:         DIAGRAMS:      DOSE VOLUME HISTOGRAMS:            "

## 2025-05-15 LAB — COMPONENT P 8504P: NORMAL

## 2025-06-16 ENCOUNTER — HOSPITAL ENCOUNTER (OUTPATIENT)
Facility: MEDICAL CENTER | Age: 70
End: 2025-06-16
Attending: SPECIALIST
Payer: MEDICARE

## 2025-06-16 LAB
ALBUMIN SERPL BCP-MCNC: 3.8 G/DL (ref 3.2–4.9)
ALBUMIN/GLOB SERPL: 1.4 G/DL
ALP SERPL-CCNC: 59 U/L (ref 30–99)
ALT SERPL-CCNC: 19 U/L (ref 2–50)
ANION GAP SERPL CALC-SCNC: 8 MMOL/L (ref 7–16)
AST SERPL-CCNC: 23 U/L (ref 12–45)
BILIRUB SERPL-MCNC: 0.4 MG/DL (ref 0.1–1.5)
BUN SERPL-MCNC: 15 MG/DL (ref 8–22)
CALCIUM ALBUM COR SERPL-MCNC: 9.7 MG/DL (ref 8.5–10.5)
CALCIUM SERPL-MCNC: 9.5 MG/DL (ref 8.5–10.5)
CHLORIDE SERPL-SCNC: 106 MMOL/L (ref 96–112)
CO2 SERPL-SCNC: 27 MMOL/L (ref 20–33)
CREAT SERPL-MCNC: 1.37 MG/DL (ref 0.5–1.4)
GFR SERPLBLD CREATININE-BSD FMLA CKD-EPI: 42 ML/MIN/1.73 M 2
GLOBULIN SER CALC-MCNC: 2.8 G/DL (ref 1.9–3.5)
GLUCOSE SERPL-MCNC: 115 MG/DL (ref 65–99)
POTASSIUM SERPL-SCNC: 4 MMOL/L (ref 3.6–5.5)
PROT SERPL-MCNC: 6.6 G/DL (ref 6–8.2)
SODIUM SERPL-SCNC: 141 MMOL/L (ref 135–145)

## 2025-06-16 PROCEDURE — 80053 COMPREHEN METABOLIC PANEL: CPT

## 2025-07-28 ENCOUNTER — HOSPITAL ENCOUNTER (OUTPATIENT)
Dept: RADIOLOGY | Facility: MEDICAL CENTER | Age: 70
End: 2025-07-28
Attending: RADIOLOGY
Payer: MEDICARE

## 2025-07-28 DIAGNOSIS — C53.8 MALIGNANT NEOPLASM OVERLAPPING CERVIX UTERI SITE (HCC): ICD-10-CM

## 2025-07-28 LAB — GLUCOSE BLD-MCNC: 96 MG/DL (ref 65–99)

## 2025-07-28 PROCEDURE — A9552 F18 FDG: HCPCS

## 2025-07-28 NOTE — PROGRESS NOTES
Pt arrived in PET/CT department for PET/CT scan and requested port access. Port was accessed using 20g mulligan needle with sterile technique. Brisk blood flow obtained. Pulsatile flush with saline. Injected 11.35mCi F18 FDG for PET/CT scan and flushed with 20cc saline. Port de accessed.

## 2025-08-04 ENCOUNTER — TELEPHONE (OUTPATIENT)
Dept: RADIATION ONCOLOGY | Facility: MEDICAL CENTER | Age: 70
End: 2025-08-04
Payer: MEDICARE

## 2025-08-05 ENCOUNTER — HOSPITAL ENCOUNTER (OUTPATIENT)
Dept: RADIATION ONCOLOGY | Facility: MEDICAL CENTER | Age: 70
End: 2025-08-05
Attending: RADIOLOGY
Payer: MEDICARE

## 2025-08-05 DIAGNOSIS — C53.8 MALIGNANT NEOPLASM OVERLAPPING CERVIX UTERI SITE (HCC): Primary | ICD-10-CM

## (undated) DEVICE — KIT  I.V. START (100EA/CA)

## (undated) DEVICE — SUCTION INSTRUMENT YANKAUER BULBOUS TIP W/O VENT (50EA/CA)

## (undated) DEVICE — MASK OXYGEN VNYL ADLT MED CONC WITH 7 FOOT TUBING - (50EA/CA)

## (undated) DEVICE — MAT PATIENT POSITIONING PREVALON (10EA/CA)

## (undated) DEVICE — SENSOR OXIMETER ADULT SPO2 RD SET (20EA/BX)

## (undated) DEVICE — SYRINGE 30 ML LL (56/BX)

## (undated) DEVICE — PACKING VAG 2 X-RAY (24EA/CS)

## (undated) DEVICE — CANISTER SUCTION RIGID RED 1500CC (40EA/CA)

## (undated) DEVICE — CATHETER COUDE FOLEY 5CC - 16FR (12EA/CA)

## (undated) DEVICE — TRAY SPINAL ANESTHESIA NON-SAFETY (20/CA)

## (undated) DEVICE — TRAY FOLEY CATHETER STATLOCK 16FR SURESTEP (10EA/CA)

## (undated) DEVICE — SYRINGE 50ML CATHETER TIP (40EA/BX 4BX/CA)

## (undated) DEVICE — SYRINGE SAFETY 10 ML 18 GA X 1 1/2 BLUNT LL (100/BX 4BX/CA)

## (undated) DEVICE — GLOVE BIOGEL SZ 7.5 SURGICAL PF LTX - (50PR/BX 4BX/CA)

## (undated) DEVICE — CANISTER SUCTION 3000ML MECHANICAL FILTER AUTO SHUTOFF MEDI-VAC NONSTERILE LF DISP (40EA/CA)

## (undated) DEVICE — JELLY SURGILUBE STERILE FOIL 5 GM (150EA/BX)

## (undated) DEVICE — TUBE CONNECTING SUCTION - CLEAR PLASTIC STERILE 72 IN (50EA/CA)

## (undated) DEVICE — Device

## (undated) DEVICE — CANNULA O2 COMFORT SOFT EAR ADULT 7 FT TUBING (50/CA)

## (undated) DEVICE — PACK SINGLE BASIN - (6/CA)

## (undated) DEVICE — TOWEL STOP TIMEOUT SAFETY FLAG (40EA/CA)

## (undated) DEVICE — SODIUM CHL IRRIGATION 0.9% 1000ML (12EA/CA)

## (undated) DEVICE — SET LEADWIRE 5 LEAD BEDSIDE DISPOSABLE ECG (1SET OF 5/EA)

## (undated) DEVICE — SUTURE GENERAL

## (undated) DEVICE — GOWN WARMING STANDARD FLEX - (30/CA)

## (undated) DEVICE — LACTATED RINGERS INJ 1000 ML - (14EA/CA 60CA/PF)

## (undated) DEVICE — SLEEVE VASO DVT COMPRESSION CALF MED - (10PR/CA)

## (undated) DEVICE — GRID DISPOSABLE 17 GA TEMPLATE (5EA/BX)

## (undated) DEVICE — GLOVE BIOGEL PI INDICATOR SZ 6.0 SURGICAL PF LF -(200PR/CA)

## (undated) DEVICE — TUBE CONNECT SUCTION CLEAR 120 X 1/4" (50EA/CA)"

## (undated) DEVICE — WATER IRRIGATION STERILE 1000ML (12EA/CA)

## (undated) DEVICE — TUBING CLEARLINK DUO-VENT - C-FLO (48EA/CA)

## (undated) DEVICE — TUBE SUCTION YANKAUER 1/4 X 6FT (50EA/CA)"

## (undated) DEVICE — DRAPE STERILE BRACHYTHERAPY (20EA/CA)

## (undated) DEVICE — SUTURE 0 VICRYL PLUS CT-2 - 27 INCH (36/BX)

## (undated) DEVICE — GLOVE BIOGEL PI INDICATOR SZ 7.5 SURGICAL PF LF -(50/BX 4BX/CA)